# Patient Record
Sex: FEMALE | Race: WHITE | HISPANIC OR LATINO | Employment: FULL TIME | URBAN - METROPOLITAN AREA
[De-identification: names, ages, dates, MRNs, and addresses within clinical notes are randomized per-mention and may not be internally consistent; named-entity substitution may affect disease eponyms.]

---

## 2017-08-23 ENCOUNTER — APPOINTMENT (OUTPATIENT)
Dept: CT IMAGING | Facility: CLINIC | Age: 47
End: 2017-08-23
Attending: EMERGENCY MEDICINE

## 2017-08-23 ENCOUNTER — HOSPITAL ENCOUNTER (EMERGENCY)
Facility: CLINIC | Age: 47
Discharge: HOME | End: 2017-08-24
Attending: EMERGENCY MEDICINE | Admitting: EMERGENCY MEDICINE

## 2017-08-23 DIAGNOSIS — N20.0 CALCULUS OF KIDNEY: ICD-10-CM

## 2017-08-23 LAB
ALBUMIN UR-MCNC: 30 MG/DL
ANION GAP SERPL CALCULATED.3IONS-SCNC: 8 MMOL/L (ref 3–14)
APPEARANCE UR: CLEAR
BACTERIA #/AREA URNS HPF: ABNORMAL /HPF
BASOPHILS # BLD AUTO: 0 10E9/L (ref 0–0.2)
BASOPHILS NFR BLD AUTO: 0.3 %
BILIRUB UR QL STRIP: NEGATIVE
BUN SERPL-MCNC: 10 MG/DL (ref 7–30)
CALCIUM SERPL-MCNC: 8.8 MG/DL (ref 8.5–10.1)
CHLORIDE SERPL-SCNC: 106 MMOL/L (ref 94–109)
CO2 SERPL-SCNC: 26 MMOL/L (ref 20–32)
COLOR UR AUTO: YELLOW
CREAT SERPL-MCNC: 0.85 MG/DL (ref 0.52–1.04)
DIFFERENTIAL METHOD BLD: ABNORMAL
EOSINOPHIL # BLD AUTO: 0.2 10E9/L (ref 0–0.7)
EOSINOPHIL NFR BLD AUTO: 1.4 %
ERYTHROCYTE [DISTWIDTH] IN BLOOD BY AUTOMATED COUNT: 19.5 % (ref 10–15)
GFR SERPL CREATININE-BSD FRML MDRD: 71 ML/MIN/1.7M2
GLUCOSE SERPL-MCNC: 99 MG/DL (ref 70–99)
GLUCOSE UR STRIP-MCNC: NEGATIVE MG/DL
HCT VFR BLD AUTO: 37.2 % (ref 35–47)
HGB BLD-MCNC: 11.8 G/DL (ref 11.7–15.7)
HGB UR QL STRIP: ABNORMAL
IMM GRANULOCYTES # BLD: 0 10E9/L (ref 0–0.4)
IMM GRANULOCYTES NFR BLD: 0.2 %
KETONES UR STRIP-MCNC: NEGATIVE MG/DL
LEUKOCYTE ESTERASE UR QL STRIP: ABNORMAL
LYMPHOCYTES # BLD AUTO: 2.7 10E9/L (ref 0.8–5.3)
LYMPHOCYTES NFR BLD AUTO: 21.7 %
MACROCYTES BLD QL SMEAR: PRESENT
MCH RBC QN AUTO: 23.5 PG (ref 26.5–33)
MCHC RBC AUTO-ENTMCNC: 31.7 G/DL (ref 31.5–36.5)
MCV RBC AUTO: 74 FL (ref 78–100)
MONOCYTES # BLD AUTO: 0.8 10E9/L (ref 0–1.3)
MONOCYTES NFR BLD AUTO: 6.1 %
MUCOUS THREADS #/AREA URNS LPF: PRESENT /LPF
NEUTROPHILS # BLD AUTO: 8.8 10E9/L (ref 1.6–8.3)
NEUTROPHILS NFR BLD AUTO: 70.3 %
NITRATE UR QL: NEGATIVE
NRBC # BLD AUTO: 0 10*3/UL
NRBC BLD AUTO-RTO: 0 /100
OVALOCYTES BLD QL SMEAR: SLIGHT
PH UR STRIP: 6 PH (ref 5–7)
PLATELET # BLD AUTO: 194 10E9/L (ref 150–450)
PLATELET # BLD EST: ABNORMAL 10*3/UL
POTASSIUM SERPL-SCNC: 3.8 MMOL/L (ref 3.4–5.3)
RBC # BLD AUTO: 5.03 10E12/L (ref 3.8–5.2)
RBC #/AREA URNS AUTO: >182 /HPF (ref 0–2)
SODIUM SERPL-SCNC: 140 MMOL/L (ref 133–144)
SOURCE: ABNORMAL
SP GR UR STRIP: 1.01 (ref 1–1.03)
SQUAMOUS #/AREA URNS AUTO: 1 /HPF (ref 0–1)
UROBILINOGEN UR STRIP-MCNC: NORMAL MG/DL (ref 0–2)
WBC # BLD AUTO: 12.5 10E9/L (ref 4–11)
WBC #/AREA URNS AUTO: 20 /HPF (ref 0–2)

## 2017-08-23 PROCEDURE — 85025 COMPLETE CBC W/AUTO DIFF WBC: CPT | Performed by: EMERGENCY MEDICINE

## 2017-08-23 PROCEDURE — 96361 HYDRATE IV INFUSION ADD-ON: CPT

## 2017-08-23 PROCEDURE — 96374 THER/PROPH/DIAG INJ IV PUSH: CPT

## 2017-08-23 PROCEDURE — 25000128 H RX IP 250 OP 636: Performed by: EMERGENCY MEDICINE

## 2017-08-23 PROCEDURE — 74176 CT ABD & PELVIS W/O CONTRAST: CPT

## 2017-08-23 PROCEDURE — 99285 EMERGENCY DEPT VISIT HI MDM: CPT | Mod: 25

## 2017-08-23 PROCEDURE — 80048 BASIC METABOLIC PNL TOTAL CA: CPT | Performed by: EMERGENCY MEDICINE

## 2017-08-23 PROCEDURE — 81001 URINALYSIS AUTO W/SCOPE: CPT | Performed by: EMERGENCY MEDICINE

## 2017-08-23 PROCEDURE — 96375 TX/PRO/DX INJ NEW DRUG ADDON: CPT

## 2017-08-23 RX ORDER — SODIUM CHLORIDE 9 MG/ML
1000 INJECTION, SOLUTION INTRAVENOUS CONTINUOUS
Status: DISCONTINUED | OUTPATIENT
Start: 2017-08-23 | End: 2017-08-24 | Stop reason: HOSPADM

## 2017-08-23 RX ORDER — KETOROLAC TROMETHAMINE 30 MG/ML
30 INJECTION, SOLUTION INTRAMUSCULAR; INTRAVENOUS ONCE
Status: COMPLETED | OUTPATIENT
Start: 2017-08-23 | End: 2017-08-23

## 2017-08-23 RX ORDER — NITROFURANTOIN 25; 75 MG/1; MG/1
100 CAPSULE ORAL 2 TIMES DAILY
Qty: 14 CAPSULE | Refills: 0 | Status: ON HOLD | OUTPATIENT
Start: 2017-08-23 | End: 2017-08-29

## 2017-08-23 RX ORDER — ONDANSETRON 2 MG/ML
4 INJECTION INTRAMUSCULAR; INTRAVENOUS EVERY 30 MIN PRN
Status: DISCONTINUED | OUTPATIENT
Start: 2017-08-23 | End: 2017-08-24 | Stop reason: HOSPADM

## 2017-08-23 RX ADMIN — ONDANSETRON 4 MG: 2 SOLUTION INTRAMUSCULAR; INTRAVENOUS at 22:47

## 2017-08-23 RX ADMIN — SODIUM CHLORIDE 1000 ML: 9 INJECTION, SOLUTION INTRAVENOUS at 23:44

## 2017-08-23 RX ADMIN — KETOROLAC TROMETHAMINE 30 MG: 30 INJECTION, SOLUTION INTRAMUSCULAR at 22:48

## 2017-08-23 RX ADMIN — SODIUM CHLORIDE 1000 ML: 9 INJECTION, SOLUTION INTRAVENOUS at 22:30

## 2017-08-23 ASSESSMENT — ENCOUNTER SYMPTOMS
ABDOMINAL PAIN: 1
FLANK PAIN: 1

## 2017-08-23 NOTE — ED AVS SNAPSHOT
"  Emergency Department    [address] [facility] [address]    Phone:  [phone]    Fax:  [phone]                                       Ayesha Pereyra   MRN: [id number]    Department:   Emergency Department   Date of Visit:  8/23/2017           Patient Information     Date Of Birth          1970        Your diagnoses for this visit were:     Calculus of kidney        You were seen by Zeyad Reardon MD.      Follow-up Information     Follow up with No Ref-Primary, Physician.    Why:  As needed        Follow up with  Emergency Department.    Specialty:  EMERGENCY MEDICINE    Why:  If symptoms worsen    Contact information:    [address] [facility] [address] [phone]        Discharge Instructions         * Cálculo Renal [Kidney Stone W/ Colic]    Muriel nanci retorcijón (cólico [cramping]) que usted siente, así jay las náuseas (nausea) y el vómito (vomiting) que tiene se deben a whit pequeña aurea (cálculo [stone]) que se le formó en el riñón y que ahora está pasando por un conducto angosto (el uréter), virginie hacia la vejiga. Whit vez que llegue a la vejiga, el dolor se detendrá. Es posible que el cálculo (aurea [stone]) pase entero por el tracto urinario. [El tamaño puede ser de entre 1/16\" y 1/4\" (1 y 6 mm)]. También puede ser que la aurea se desgrane en fragmentos arenosos de los que usted quizás ni siquiera se dé cuenta.  Cuando se ha tenido un cálculo renal (kidney stone), hay probabilidades de que otro vuelva a formarse en el futuro.  Cuidados En [place]:  1. Richelle gran cantidad de agua (al menos, entre 8 y 10 vasos al día).  2. La mayoría de los cálculos (piedras [stones]) salen solas, roz pueden tardar desde algunas horas a algunos días en hacerlo.  3. Cada vez que orine, hágalo en un recipiente (jarra o algo similar). Vierta la orina de muriel recipiente en el inodoro pasándola por un colador. Hágalo así hasta que hayan " pasado 24 horas después de que haya cesado el dolor. Para entonces, si había un cálculo renal (aurea en el riñón [kidney stone]), ya debería stan salido de la vejiga. Algunas piedras se disuelven en partículas que parecen arena y atraviesan el colador sin que usted lo note. Si eso sucede, no verá ninguna aurea.  4. Si encuentra naina aurea en el colador, guárdela y llévesela al médico para que la analice. Hay algunos tipos de aurea cuya formación se puede prevenir. Por lo tanto, es importante saber qué tipo de aurea es la que usted tiene.  5. Intente mantenerse lo más activo posible, dado que eso ayudará a expulsar la aurea. No se quede en la cama a menos que el dolor le impida estar levantado. Quizás note que la orina es de color rojiza, rosada o amarronada. Es normal cuando se está despidiendo un cálculo renal (kidney stone).  Programe naina VISITA DE CONTROL con rios médico o regrese a ken centro si el dolor dura más de 48 horas.  Busque Prontamente Atención Médica  si algo de lo siguiente ocurre:    El dolor no disminuye con el medicamento que le dieron.    Vómito persistente o no puede mantener los líquidos en el estómago.    Debilidad, mareo o desmayo.    Fiebre de 101 F (38.3 C) o más pierre.    Orina opaca de color rojizo o amarronada (no se puede shirley a través de joanie) u orina que tiene muchos coágulos de tosin (blood clots).    No ha podido orinar en 8 horas y siente naina presión en la vejiga que es cada vez mayor.       [id number] The Narrative. [address] [place] [address], [place] [address]. Todos los derechos reservados. Esta información no pretende sustituir la atención médica profesional. Sólo rios médico puede diagnosticar y tratar un problema de allison.          El tratamiento de los cálculos renales: terapia expectante  La mayoría de los cálculos renales son del tamaño de naina semilla de uva. Los cálculos de ken tamaño son lo suficientemente pequeños jay para ser expulsados naturalmente con la  orina. Whit vez expulsados, pueden analizarse. Esta estrategia de  esperar y shirley  se conoce jay tratamiento expectante. A menudo, las piedras pequeñas pueden expulsarse dahlia ken tipo de terapia. Si tiene dolor, hable con rios médico respecto de usar analgésicos y siga finn indicaciones sobre cuánta cantidad de agua debe cristina. Beber más agua produce más orina, con lo que tendrá más probabilidades de eliminar la aurea. También use un filtro para colar rios orina y lleve todas las piedras que encuentre a rios médico para que las analice.    Richelle mucha agua  Beber mucha agua puede facilitarle la expulsión de los cálculos. El agua también diluye las sustancias químicas que hay en rios orina, lo cual reduce el riesgo de formación de piedras nuevas. Puede que le hayan indicado beber ocho vasos de 12 onzas (355 cc) de agua al día. Evite líquidos que provoquen deshidratación, jay los que contienen cafeína o alcohol.  Filtre rios orina  [place] le permitirá recoger finn piedras para analizarlas. Use el filtro o colador cada que vez que orine. Hágalo dahlia todo el tiempo que rios médico le sugiera. Observe si ve motas o piedritas diminutas de color marrón, seema, dorado o tiffanie. Pueden ser cálculos renales.  [place] rios medicamento  Es posible que rios médico le haya dado un medicamento que aumente finn probabilidades de eliminar un cálculo renal.   Maximiliano el seguimiento con rios médico  Asista a las visitas de control y lleve todas las piedras que encuentre a rios médico para que las analice. El tipo de aurea que tenga determina rios dieta y rios programa de prevención. Es posible que necesite más pruebas en el futuro para asegurar que no se estén formando piedras nuevas.  Date Last Reviewed: 1/5/2015 2000-2017 [employer]. [address] [place] [address], [place] [address]. Todos los derechos reservados. Esta información no pretende sustituir la atención médica profesional. Sólo rios médico puede diagnosticar y tratar un problema de  allison.          24 Hour Appointment Hotline       To make an appointment at any [facility], call [phone] ([phone]). If you don't have a family doctor or clinic, we will help you find one. [facility] are conveniently located to serve the needs of you and your family.             Review of your medicines      START taking        Dose / Directions Last dose taken    HYDROcodone-acetaminophen 5-325 MG per tablet   Commonly known as:  NORCO   Dose:  1-2 tablet   Quantity:  15 tablet        Take 1-2 tablets by mouth every 6 hours as needed for moderate to severe pain   Refills:  0        nitroFURantoin (macrocrystal-monohydrate) 100 MG capsule   Commonly known as:  MACROBID   Dose:  100 mg   Quantity:  14 capsule        Take 1 capsule (100 mg) by mouth 2 times daily   Refills:  0        tamsulosin 0.4 MG capsule   Commonly known as:  FLOMAX   Dose:  0.4 mg   Quantity:  7 capsule        Take 1 capsule (0.4 mg) by mouth daily for 7 doses   Refills:  0          CONTINUE these medicines which may have CHANGED, or have new prescriptions. If we are uncertain of the size of tablets/capsules you have at home, strength may be listed as something that might have changed.        Dose / Directions Last dose taken    * ibuprofen 200 MG tablet   Commonly known as:  ADVIL/MOTRIN   Dose:  800 mg   What changed:  Another medication with the same name was added. Make sure you understand how and when to take each.   Quantity:  1 tablet        Take 4 tablets by mouth every 8 hours as needed for pain.   Refills:  0        * ibuprofen 200 MG tablet   Commonly known as:  ADVIL/MOTRIN   Dose:  400 mg   What changed:  You were already taking a medication with the same name, and this prescription was added. Make sure you understand how and when to take each.   Quantity:  60 tablet        Take 2 tablets (400 mg) by mouth every 8 hours as needed for pain   Refills:  0        * Notice:  This list has 2 medication(s) that are  the same as other medications prescribed for you. Read the directions carefully, and ask your doctor or other care provider to review them with you.      Our records show that you are taking the medicines listed below. If these are incorrect, please call your family doctor or clinic.        Dose / Directions Last dose taken    acetaminophen 500 MG tablet   Commonly known as:  TYLENOL   Dose:  500-1000 mg   Quantity:  1 tablet        Take 1-2 tablets by mouth every 6 hours as needed for pain.   Refills:  0        zolpidem 10 MG tablet   Commonly known as:  AMBIEN   Dose:  10 mg   Quantity:  10 tablet        Take 1 tablet by mouth nightly as needed for sleep.   Refills:  0                Prescriptions were sent or printed at these locations (4 Prescriptions)                   Other Prescriptions                Printed at Department/Unit printer (4 of 4)         nitroFURantoin, macrocrystal-monohydrate, (MACROBID) 100 MG capsule               HYDROcodone-acetaminophen (NORCO) 5-325 MG per tablet               tamsulosin (FLOMAX) 0.4 MG capsule               ibuprofen (ADVIL/MOTRIN) 200 MG tablet                Procedures and tests performed during your visit     Basic metabolic panel    CBC with platelets differential    CT Abdomen Pelvis w/o Contrast (stone protocol)    UA with Microscopic      Orders Needing Specimen Collection     None      Pending Results     No orders found for last 3 day(s).            Pending Culture Results     No orders found for last 3 day(s).            Pending Results Instructions     If you had any lab results that were not finalized at the time of your Discharge, you can call the ED Lab Result RN at [phone]. You will be contacted by this team for any positive Lab results or changes in treatment. The nurses are available 7 days a week from 10A to 6:30P.  You can leave a message 24 hours per day and they will return your call.        Test Results From Your Hospital Stay         8/23/2017 11:18 PM      Component Results     Component Value Ref Range & Units Status    WBC 12.5 (H) 4.0 - 11.0 10e9/L Final    RBC Count 5.03 3.8 - 5.2 10e12/L Final    Hemoglobin 11.8 11.7 - 15.7 g/dL Final    Hematocrit 37.2 35.0 - 47.0 % Final    MCV 74 (L) 78 - 100 fl Final    MCH 23.5 (L) 26.5 - 33.0 pg Final    MCHC 31.7 31.5 - 36.5 g/dL Final    RDW 19.5 (H) 10.0 - 15.0 % Final    Platelet Count 194 150 - 450 10e9/L Final    Diff Method Automated Method  Final    % Neutrophils 70.3 % Final    % Lymphocytes 21.7 % Final    % Monocytes 6.1 % Final    % Eosinophils 1.4 % Final    % Basophils 0.3 % Final    % Immature Granulocytes 0.2 % Final    Nucleated RBCs 0 0 /100 Final    Absolute Neutrophil 8.8 (H) 1.6 - 8.3 10e9/L Final    Absolute Lymphocytes 2.7 0.8 - 5.3 10e9/L Final    Absolute Monocytes 0.8 0.0 - 1.3 10e9/L Final    Absolute Eosinophils 0.2 0.0 - 0.7 10e9/L Final    Absolute Basophils 0.0 0.0 - 0.2 10e9/L Final    Abs Immature Granulocytes 0.0 0 - 0.4 10e9/L Final    Absolute Nucleated RBC 0.0  Final    Ovalocytes Slight  Final    Macrocytes Present  Final    Platelet Estimate   Final    Confirming automated cell count         8/23/2017 11:08 PM      Component Results     Component Value Ref Range & Units Status    Sodium 140 133 - 144 mmol/L Final    Potassium 3.8 3.4 - 5.3 mmol/L Final    Chloride 106 94 - 109 mmol/L Final    Carbon Dioxide 26 20 - 32 mmol/L Final    Anion Gap 8 3 - 14 mmol/L Final    Glucose 99 70 - 99 mg/dL Final    Urea Nitrogen 10 7 - 30 mg/dL Final    Creatinine 0.85 0.52 - 1.04 mg/dL Final    GFR Estimate 71 >60 mL/min/1.7m2 Final    Non  GFR Calc    GFR Estimate If Black 86 >60 mL/min/1.7m2 Final    African American GFR Calc    Calcium 8.8 8.5 - 10.1 mg/dL Final         8/23/2017 11:19 PM      Component Results     Component Value Ref Range & Units Status    Color Urine Yellow  Final    Appearance Urine Clear  Final    Glucose Urine Negative NEG^Negative  mg/dL Final    Bilirubin Urine Negative NEG^Negative Final    Ketones Urine Negative NEG^Negative mg/dL Final    Specific Gravity Urine 1.014 1.003 - 1.035 Final    Blood Urine Moderate (A) NEG^Negative Final    pH Urine 6.0 5.0 - 7.0 pH Final    Protein Albumin Urine 30 (A) NEG^Negative mg/dL Final    Urobilinogen mg/dL Normal 0.0 - 2.0 mg/dL Final    Nitrite Urine Negative NEG^Negative Final    Leukocyte Esterase Urine Trace (A) NEG^Negative Final    Source Midstream Urine  Final    WBC Urine 20 (H) 0 - 2 /HPF Final    RBC Urine >182 (H) 0 - 2 /HPF Final    Bacteria Urine Few (A) NEG^Negative /HPF Final    Squamous Epithelial /HPF Urine 1 0 - 1 /HPF Final    Mucous Urine Present (A) NEG^Negative /LPF Final         8/23/2017 11:44 PM      Narrative     CT ABDOMEN PELVIS W/O CONTRAST  8/23/2017 11:15 PM     INDICATION: Abdominal or flank pain, possible stone.      TECHNIQUE: Thin axial images through the abdomen and pelvis without  contrast. Coronal reformatted images. Radiation dose for this scan was  reduced using automated exposure control, adjustment of the mA and/or  kV according to patient size, or iterative reconstruction technique.    COMPARISON: 4/12/2012.    FINDINGS: Mild-moderate right hydronephrosis and perinephric stranding  due to a 0.4 cm UVJ stone. No other urinary stones. Liver is slightly  enlarged measuring 21 cm craniocaudad. No focal liver lesions.  Contracted gallbladder. Spleen, pancreas and adrenal glands are  negative without benefit of contrast.    Uterus is present. No suspicious pelvic masses. No bowel obstruction  or other acute findings. Mild degenerative and hypertrophic changes in  the visualized spine.        Impression     IMPRESSION: Mild-moderate right hydronephrosis due to a 0.4 cm UVJ  stone.    CURRY ONEIL MD                Clinical Quality Measure: Blood Pressure Screening     Your blood pressure was checked while you were in the emergency department today. The last  "reading we obtained was  BP: 133/73 . Please read the guidelines below about what these numbers mean and what you should do about them.  If your systolic blood pressure (the top number) is less than 120 and your diastolic blood pressure (the bottom number) is less than 80, then your blood pressure is normal. There is nothing more that you need to do about it.  If your systolic blood pressure (the top number) is 120-139 or your diastolic blood pressure (the bottom number) is 80-89, your blood pressure may be higher than it should be. You should have your blood pressure rechecked within a year by a primary care provider.  If your systolic blood pressure (the top number) is 140 or greater or your diastolic blood pressure (the bottom number) is 90 or greater, you may have high blood pressure. High blood pressure is treatable, but if left untreated over time it can put you at risk for heart attack, stroke, or kidney failure. You should have your blood pressure rechecked by a primary care provider within the next 4 weeks.  If your provider in the emergency department today gave you specific instructions to follow-up with your doctor or provider even sooner than that, you should follow that instruction and not wait for up to 4 weeks for your follow-up visit.        Thank you for choosing [facility]       Thank you for choosing [facility] for your care. Our goal is always to provide you with excellent care. Hearing back from our patients is one way we can continue to improve our services. Please take a few minutes to complete the written survey that you may receive in the mail after you visit with us. Thank you!        [facility] Information     [facility] lets you send messages to your doctor, view your test results, renew your prescriptions, schedule appointments and more. To sign up, go to [email or web address] . Click on \"Log in\" on the left side of the screen, which will take you to the Welcome page. Then click on \"Sign up " "Now\" on the right side of the page.     You will be asked to enter the access code listed below, as well as some personal information. Please follow the directions to create your username and password.     Your access code is: [email or web address]  Expires: 2017 12:06 AM     Your access code will  in 90 days. If you need help or a new code, please call your [facility] clinic or [phone].        Care EveryWhere ID     This is your Care EveryWhere ID. This could be used by other organizations to access your [facility] medical records  [id number]        Equal Access to Services     [facility] : Milly Garcia, mehdi cueva, winston gallardo, jose g yee . So [facility] [phone].    ATENCIÓN: Si habla español, tiene a rios disposición servicios gratuitos de asistencia lingüística. Llame al [phone].    We comply with applicable federal civil rights laws and [place] laws. We do not discriminate on the basis of race, color, national origin, age, disability sex, sexual orientation or gender identity.            After Visit Summary       This is your record. Keep this with you and show to your community pharmacist(s) and doctor(s) at your next visit.                  "

## 2017-08-23 NOTE — ED AVS SNAPSHOT
Emergency Department    [address] [place] [address]    Phone:  [phone]    Fax:  [phone]                                       Ayesha Pereyra   MRN: [id number]    Department:   Emergency Department   Date of Visit:  8/23/2017           After Visit Summary Signature Page     I have received my discharge instructions, and my questions have been answered. I have discussed any challenges I see with this plan with the nurse or doctor.    ..........................................................................................................................................  Patient/Patient Representative Signature      ..........................................................................................................................................  Patient Representative Print Name and Relationship to Patient    ..................................................               ................................................  Date                                            Time    ..........................................................................................................................................  Reviewed by Signature/Title    ...................................................              ..............................................  Date                                                            Time

## 2017-08-24 VITALS
DIASTOLIC BLOOD PRESSURE: 73 MMHG | SYSTOLIC BLOOD PRESSURE: 133 MMHG | OXYGEN SATURATION: 97 % | TEMPERATURE: 99.1 F | HEIGHT: 66 IN

## 2017-08-24 PROCEDURE — 25000132 ZZH RX MED GY IP 250 OP 250 PS 637: Performed by: EMERGENCY MEDICINE

## 2017-08-24 RX ORDER — HYDROCODONE BITARTRATE AND ACETAMINOPHEN 5; 325 MG/1; MG/1
1-2 TABLET ORAL EVERY 6 HOURS PRN
Qty: 15 TABLET | Refills: 0 | Status: ON HOLD | OUTPATIENT
Start: 2017-08-24 | End: 2017-08-28

## 2017-08-24 RX ORDER — IBUPROFEN 200 MG
400 TABLET ORAL EVERY 8 HOURS PRN
Qty: 60 TABLET | Refills: 0 | Status: ON HOLD | OUTPATIENT
Start: 2017-08-24 | End: 2017-08-28

## 2017-08-24 RX ORDER — HYDROCODONE BITARTRATE AND ACETAMINOPHEN 5; 325 MG/1; MG/1
1 TABLET ORAL ONCE
Status: COMPLETED | OUTPATIENT
Start: 2017-08-24 | End: 2017-08-24

## 2017-08-24 RX ORDER — TAMSULOSIN HYDROCHLORIDE 0.4 MG/1
0.4 CAPSULE ORAL DAILY
Qty: 7 CAPSULE | Refills: 0 | Status: ON HOLD | OUTPATIENT
Start: 2017-08-24 | End: 2017-08-28

## 2017-08-24 RX ADMIN — HYDROCODONE BITARTRATE AND ACETAMINOPHEN 1 TABLET: 5; 325 TABLET ORAL at 00:26

## 2017-08-24 NOTE — ED PROVIDER NOTES
"  History     Chief Complaint:  Abdominal Pain    The patient's preferred language is [place] and subsequently a translation was provided by Dr. Reardon.    VELIA Pereyra is a 46 year old female who presents to the emergency department today for abdominal pain. The patient reports right middle and lower quadrant abdominal pain since this morning that she describes as a sharp, poking sensation. The pain radiates to her right flank and is similar to her previous episodes.  Specifically, the pain comes and goes, and is accompanied by hematuria.  Severe when present.  Not worse with exertion or with p.o. intake.    Allergies:  Keflex [Cephalexin]      Medications:    zolpidem (AMBIEN) 10 MG tablet    Past Medical History:    Kidney Stones    Past Surgical History:    GYN Surgery  Hysterectomy    Family History:    History reviewed. No pertinent family history.     Social History:  The patient was accompanied to the ED by her family.  Smoking Status: Never Smoker  Smokeless Tobacco: Never Used  Alcohol Use: No   Marital Status:  Single      Review of Systems   Gastrointestinal: Positive for abdominal pain.   Genitourinary: Positive for flank pain (right).   All other systems reviewed and are negative.    Physical Exam   First Vitals:  BP: 153/78  Heart Rate: 63  Temp: 99.1  F (37.3  C)  Height: 167.6 cm (5' 6\")  SpO2: 100 %    Physical Exam  General: [facility], cooperative, and alert to conversation  Eyes: Tracking well, clear conj BL  ENT: MMM, neck supple with no TTP  CV: RRR no JVD noted  Respiratory:  Lungs are CTA BL.  No tachypnea, no accessory m use, speaking in full sentences.  Abd: Does have right flank and right lower quadrant tenderness to palpation, with no guarding and rebound.  Does have right CVA tenderness.  Soft overall.  Skin: No skin changes, no edema or rash present to extremities  Neuro: Clear speech and no facial droop.  Psych: Not RIS, no e/o " AH/VH    Emergency Department Course     Imaging:  Radiology findings were communicated with the patient who voiced understanding of the findings.    CT Abdomen Pelvis w/o Contrast (stone protocol)  MPRESSION: Mild-moderate right hydronephrosis due to a 0.4 cm UVJ  stone.  Report per radiology      Laboratory:  Laboratory findings were communicated with the patient who voiced understanding of the findings.  UA with Microscopic: clear yellow urine, moderate blood, protein albumin 30, trace leukocyte esterase, WBC/HPF 20 (H), RBC/HPF >182 (H), few bacteria, mucous present o/w WNL  CBC: WBC 12.5 (H) o/w WNL. (HGB 11.8, )   BMP: AWNL (Creatinine 0.85)     Interventions:  2230: NS Bolus 1,000mL IV  2247: Zofran 4mg IV  2248: Toradol 30mg IV      Emergency Department Course:  Nursing notes and vitals reviewed.  2052: I performed an exam of the patient as documented above.   The patient was sent for a CT Abdomen Pelvis w/o Contrast (stone protocol) while in the emergency department, results above.   IV was inserted and blood was drawn for laboratory testing, results above.  The patient provided a urine sample here in the emergency department. This was sent for laboratory testing, findings above.   12821: Patient rechecked and updated.    I discussed the treatment plan with the patient. They expressed understanding of this plan and consented to discharge. They will be discharged home with instructions for care and follow up. In addition, the patient will return to the emergency department if their symptoms persist, worsen, if new symptoms arise or if there is any concern.  All questions were answered.   I personally reviewed the laboratory and imaging results with the Patient and answered all related questions prior to discharge.    Impression & Plan      Medical Decision Making:  Ayesha Pereyra is a 46 year old female who presents with what appears to be a kidney stone. The patient's pain profile does fit  well with that of a kidney stone, which was confirmed on a CT scan. She does have mild hydro however it is a distal stone and 4 mm, the patient does merit a trial passage. I will plan for pain control and discharge into primary care follow up. At this point, I see no evidence of appendicitis or other abdominal pathology. The patient has no fevers, normal vital signs and a urine that shows trace leukocyte esterase. However given hydronephrosis, we will give prophylactic antibiotics as patient will follow up with regular doctor. The patient was in agreement with the plan and all questions were answered.    Diagnosis:    ICD-10-CM    1. Calculus of kidney N20.0      Disposition:  discharged to home with below prescription    Discharge Medications:  New Prescriptions    NITROFURANTOIN, MACROCRYSTAL-MONOHYDRATE, (MACROBID) 100 MG CAPSULE    Take 1 capsule (100 mg) by mouth 2 times daily     Scribe Disclosure:  THOR, Patti Nolasco, am serving as a scribe at 10:54 PM on 8/23/2017 to document services personally performed by Zeyad Reardon MD based on my observations and the provider's statements to me.    8/23/2017    EMERGENCY DEPARTMENT       Zeyad Reardon MD  08/24/17 0017

## 2017-08-24 NOTE — ED NOTES
"Patient presents with RLQ abdominal pain, right flank pain and pain with urination. Patient states it is a sharp pain with burning with urination.  Patient has been told in past she has \"kidney rocks\". Patient alert and oriented, has been nauseated, is on day 3 of menstrual cycle.    "

## 2017-08-24 NOTE — DISCHARGE INSTRUCTIONS
"  * Cálculo Renal [Kidney Stone W/ Colic]    Muriel nanci retorcijón (cólico [cramping]) que usted siente, así jay las náuseas (nausea) y el vómito (vomiting) que tiene se deben a naina pequeña aurea (cálculo [stone]) que se le formó en el riñón y que ahora está pasando por un conducto angosto (el uréter), virginie hacia la vejiga. Naina vez que llegue a la vejiga, el dolor se detendrá. Es posible que el cálculo (aurea [stone]) pase entero por el tracto urinario. [El tamaño puede ser de entre 1/16\" y 1/4\" (1 y 6 mm)]. También puede ser que la aurea se desgrane en fragmentos arenosos de los que usted quizás ni siquiera se dé cuenta.  Cuando se ha tenido un cálculo renal (kidney stone), hay probabilidades de que otro vuelva a formarse en el futuro.  Cuidados En [place]:  1. Richelle gran cantidad de agua (al menos, entre 8 y 10 vasos al día).  2. La mayoría de los cálculos (piedras [stones]) salen solas, roz pueden tardar desde algunas horas a algunos días en hacerlo.  3. Cada vez que orine, hágalo en un recipiente (jarra o algo similar). Vierta la orina de muriel recipiente en el inodoro pasándola por un colador. Hágalo así hasta que hayan pasado 24 horas después de que haya cesado el dolor. Para entonces, si había un cálculo renal (aurea en el riñón [kidney stone]), ya debería stan salido de la vejiga. Algunas piedras se disuelven en partículas que parecen arena y atraviesan el colador sin que usted lo note. Si eso sucede, no verá ninguna aurea.  4. Si encuentra naina aurea en el colador, guárdela y llévesela al médico para que la analice. Hay algunos tipos de aurea cuya formación se puede prevenir. Por lo tanto, es importante saber qué tipo de aurea es la que usted tiene.  5. Intente mantenerse lo más activo posible, dado que eso ayudará a expulsar la aurea. No se quede en la cama a menos que el dolor le impida estar levantado. Quizás note que la orina es de color rojiza, rosada o amarronada. Es normal cuando se está " despidiendo un cálculo renal (kidney stone).  Programe naina VISITA DE CONTROL con rios médico o regrese a ken centro si el dolor dura más de 48 horas.  Busque Prontamente Atención Médica  si algo de lo siguiente ocurre:    El dolor no disminuye con el medicamento que le dieron.    Vómito persistente o no puede mantener los líquidos en el estómago.    Debilidad, mareo o desmayo.    Fiebre de 101 F (38.3 C) o más pierre.    Orina opaca de color rojizo o amarronada (no se puede shirley a través de joanie) u orina que tiene muchos coágulos de tosin (blood clots).    No ha podido orinar en 8 horas y siente naina presión en la vejiga que es cada vez mayor.       [id number] [employer]. [address] [place] [id number]. Todos los derechos reservados. Esta información no pretende sustituir la atención médica profesional. Sólo rios médico puede diagnosticar y tratar un problema de allison.          El tratamiento de los cálculos renales: terapia expectante  La mayoría de los cálculos renales son del tamaño de naina semilla de uva. Los cálculos de ken tamaño son lo suficientemente pequeños jay para ser expulsados naturalmente con la orina. Naina vez expulsados, pueden analizarse. Esta estrategia de  esperar y shirley  se conoce jay tratamiento expectante. A menudo, las piedras pequeñas pueden expulsarse dahlia ken tipo de terapia. Si tiene dolor, hable con rios médico respecto de usar analgésicos y siga finn indicaciones sobre cuánta cantidad de agua debe cristina. Beber más agua produce más orina, con lo que tendrá más probabilidades de eliminar la aurea. También use un filtro para colar rios orina y lleve todas las piedras que encuentre a rios médico para que las analice.    Richelle mucha agua  Beber mucha agua puede facilitarle la expulsión de los cálculos. El agua también diluye las sustancias químicas que hay en rios orina, lo cual reduce el riesgo de formación de piedras nuevas. Puede que le hayan indicado beber ocho vasos de 12  onzas (355 cc) de agua al día. Evite líquidos que provoquen deshidratación, jay los que contienen cafeína o alcohol.  Filtre rios orina  [place] le permitirá recoger finn piedras para analizarlas. Use el filtro o colador cada que vez que orine. Hágalo dahlia todo el tiempo que rios médico le sugiera. Observe si ve motas o piedritas diminutas de color marrón, seema, dorado o tiffanie. Pueden ser cálculos renales.  [place] rios medicamento  Es posible que rios médico le haya dado un medicamento que aumente finn probabilidades de eliminar un cálculo renal.   Maximiliano el seguimiento con rios médico  Asista a las visitas de control y lleve todas las piedras que encuentre a rios médico para que las analice. El tipo de aurea que tenga determina rios dieta y rios programa de prevención. Es posible que necesite más pruebas en el futuro para asegurar que no se estén formando piedras nuevas.  Date Last Reviewed: 1/5/2015 2000-2017 [employer]. [address] [place] [id number]. Todos los derechos reservados. Esta información no pretende sustituir la atención médica profesional. Sólo rios médico puede diagnosticar y tratar un problema de allison.

## 2017-08-25 ENCOUNTER — APPOINTMENT (OUTPATIENT)
Dept: GENERAL RADIOLOGY | Facility: CLINIC | Age: 47
DRG: 871 | End: 2017-08-25
Attending: INTERNAL MEDICINE

## 2017-08-25 ENCOUNTER — ANESTHESIA EVENT (OUTPATIENT)
Dept: SURGERY | Facility: CLINIC | Age: 47
DRG: 871 | End: 2017-08-25

## 2017-08-25 ENCOUNTER — HOSPITAL ENCOUNTER (INPATIENT)
Facility: CLINIC | Age: 47
LOS: 4 days | Discharge: HOME | DRG: 871 | End: 2017-08-29
Attending: EMERGENCY MEDICINE | Admitting: INTERNAL MEDICINE

## 2017-08-25 ENCOUNTER — APPOINTMENT (OUTPATIENT)
Dept: GENERAL RADIOLOGY | Facility: CLINIC | Age: 47
DRG: 871 | End: 2017-08-25
Attending: EMERGENCY MEDICINE

## 2017-08-25 ENCOUNTER — ANESTHESIA (OUTPATIENT)
Dept: SURGERY | Facility: CLINIC | Age: 47
DRG: 871 | End: 2017-08-25

## 2017-08-25 DIAGNOSIS — N20.0 KIDNEY STONE ON RIGHT SIDE: ICD-10-CM

## 2017-08-25 DIAGNOSIS — A41.9 SEPTIC SHOCK (H): ICD-10-CM

## 2017-08-25 DIAGNOSIS — R19.7 DIARRHEA, UNSPECIFIED TYPE: Primary | ICD-10-CM

## 2017-08-25 DIAGNOSIS — R65.21 SEPTIC SHOCK (H): ICD-10-CM

## 2017-08-25 DIAGNOSIS — N20.0 CALCULUS OF KIDNEY: ICD-10-CM

## 2017-08-25 DIAGNOSIS — A41.9 SEPSIS, DUE TO UNSPECIFIED ORGANISM: ICD-10-CM

## 2017-08-25 PROBLEM — N20.1 RIGHT URETERAL STONE: Status: ACTIVE | Noted: 2017-08-25

## 2017-08-25 PROBLEM — N12 PYELONEPHRITIS: Status: ACTIVE | Noted: 2017-08-25

## 2017-08-25 LAB
ALBUMIN UR-MCNC: 10 MG/DL
ANION GAP SERPL CALCULATED.3IONS-SCNC: 9 MMOL/L (ref 3–14)
APPEARANCE UR: ABNORMAL
BACTERIA #/AREA URNS HPF: ABNORMAL /HPF
BASOPHILS # BLD AUTO: 0 10E9/L (ref 0–0.2)
BASOPHILS NFR BLD AUTO: 0.1 %
BILIRUB UR QL STRIP: NEGATIVE
BUN SERPL-MCNC: 9 MG/DL (ref 7–30)
CALCIUM SERPL-MCNC: 8.6 MG/DL (ref 8.5–10.1)
CHLORIDE SERPL-SCNC: 104 MMOL/L (ref 94–109)
CO2 SERPL-SCNC: 23 MMOL/L (ref 20–32)
COLOR UR AUTO: ABNORMAL
CREAT SERPL-MCNC: 0.77 MG/DL (ref 0.52–1.04)
DIFFERENTIAL METHOD BLD: ABNORMAL
EOSINOPHIL # BLD AUTO: 0 10E9/L (ref 0–0.7)
EOSINOPHIL NFR BLD AUTO: 0 %
ERYTHROCYTE [DISTWIDTH] IN BLOOD BY AUTOMATED COUNT: 19.9 % (ref 10–15)
GFR SERPL CREATININE-BSD FRML MDRD: 79 ML/MIN/1.7M2
GLUCOSE BLDC GLUCOMTR-MCNC: 138 MG/DL (ref 70–99)
GLUCOSE BLDC GLUCOMTR-MCNC: 142 MG/DL (ref 70–99)
GLUCOSE BLDC GLUCOMTR-MCNC: 163 MG/DL (ref 70–99)
GLUCOSE SERPL-MCNC: 123 MG/DL (ref 70–99)
GLUCOSE UR STRIP-MCNC: NEGATIVE MG/DL
HCT VFR BLD AUTO: 32.5 % (ref 35–47)
HGB BLD-MCNC: 10.5 G/DL (ref 11.7–15.7)
HGB UR QL STRIP: ABNORMAL
IMM GRANULOCYTES # BLD: 0.1 10E9/L (ref 0–0.4)
IMM GRANULOCYTES NFR BLD: 0.4 %
INTERPRETATION ECG - MUSE: NORMAL
KETONES UR STRIP-MCNC: 40 MG/DL
LACTATE BLD-SCNC: 1 MMOL/L (ref 0.7–2)
LEUKOCYTE ESTERASE UR QL STRIP: ABNORMAL
LYMPHOCYTES # BLD AUTO: 0.6 10E9/L (ref 0.8–5.3)
LYMPHOCYTES NFR BLD AUTO: 3.5 %
MACROCYTES BLD QL SMEAR: PRESENT
MCH RBC QN AUTO: 23.6 PG (ref 26.5–33)
MCHC RBC AUTO-ENTMCNC: 32.3 G/DL (ref 31.5–36.5)
MCV RBC AUTO: 73 FL (ref 78–100)
MONOCYTES # BLD AUTO: 1.3 10E9/L (ref 0–1.3)
MONOCYTES NFR BLD AUTO: 7.9 %
MUCOUS THREADS #/AREA URNS LPF: PRESENT /LPF
NEUTROPHILS # BLD AUTO: 14.4 10E9/L (ref 1.6–8.3)
NEUTROPHILS NFR BLD AUTO: 88.1 %
NITRATE UR QL: NEGATIVE
NRBC # BLD AUTO: 0 10*3/UL
NRBC BLD AUTO-RTO: 0 /100
OVALOCYTES BLD QL SMEAR: SLIGHT
PH UR STRIP: 5.5 PH (ref 5–7)
PLATELET # BLD AUTO: 140 10E9/L (ref 150–450)
PLATELET # BLD EST: ABNORMAL 10*3/UL
POTASSIUM SERPL-SCNC: 3.1 MMOL/L (ref 3.4–5.3)
POTASSIUM SERPL-SCNC: 3.9 MMOL/L (ref 3.4–5.3)
RBC # BLD AUTO: 4.45 10E12/L (ref 3.8–5.2)
RBC #/AREA URNS AUTO: 11 /HPF (ref 0–2)
SODIUM SERPL-SCNC: 136 MMOL/L (ref 133–144)
SOURCE: ABNORMAL
SP GR UR STRIP: 1.01 (ref 1–1.03)
SQUAMOUS #/AREA URNS AUTO: 9 /HPF (ref 0–1)
UROBILINOGEN UR STRIP-MCNC: NORMAL MG/DL (ref 0–2)
WBC # BLD AUTO: 16.4 10E9/L (ref 4–11)
WBC #/AREA URNS AUTO: 11 /HPF (ref 0–2)

## 2017-08-25 PROCEDURE — 83605 ASSAY OF LACTIC ACID: CPT | Performed by: EMERGENCY MEDICINE

## 2017-08-25 PROCEDURE — 25000132 ZZH RX MED GY IP 250 OP 250 PS 637: Performed by: PHYSICIAN ASSISTANT

## 2017-08-25 PROCEDURE — C2617 STENT, NON-COR, TEM W/O DEL: HCPCS | Performed by: UROLOGY

## 2017-08-25 PROCEDURE — 84132 ASSAY OF SERUM POTASSIUM: CPT | Performed by: HOSPITALIST

## 2017-08-25 PROCEDURE — 25000128 H RX IP 250 OP 636: Performed by: EMERGENCY MEDICINE

## 2017-08-25 PROCEDURE — 25800025 ZZH RX 258: Performed by: UROLOGY

## 2017-08-25 PROCEDURE — 25000132 ZZH RX MED GY IP 250 OP 250 PS 637: Performed by: INTERNAL MEDICINE

## 2017-08-25 PROCEDURE — 96375 TX/PRO/DX INJ NEW DRUG ADDON: CPT

## 2017-08-25 PROCEDURE — 36415 COLL VENOUS BLD VENIPUNCTURE: CPT

## 2017-08-25 PROCEDURE — 02HV33Z INSERTION OF INFUSION DEVICE INTO SUPERIOR VENA CAVA, PERCUTANEOUS APPROACH: ICD-10-PCS | Performed by: EMERGENCY MEDICINE

## 2017-08-25 PROCEDURE — 27210131 ZZH KIT ART LINE INSERTION

## 2017-08-25 PROCEDURE — 99291 CRITICAL CARE FIRST HOUR: CPT | Performed by: PHYSICIAN ASSISTANT

## 2017-08-25 PROCEDURE — 99285 EMERGENCY DEPT VISIT HI MDM: CPT | Mod: 25

## 2017-08-25 PROCEDURE — 25000125 ZZHC RX 250: Performed by: REGISTERED NURSE

## 2017-08-25 PROCEDURE — 96361 HYDRATE IV INFUSION ADD-ON: CPT

## 2017-08-25 PROCEDURE — 27210995 ZZH RX 272: Performed by: UROLOGY

## 2017-08-25 PROCEDURE — 96365 THER/PROPH/DIAG IV INF INIT: CPT

## 2017-08-25 PROCEDURE — 36000052 ZZH SURGERY LEVEL 2 EA 15 ADDTL MIN: Performed by: UROLOGY

## 2017-08-25 PROCEDURE — 87040 BLOOD CULTURE FOR BACTERIA: CPT | Performed by: EMERGENCY MEDICINE

## 2017-08-25 PROCEDURE — 99207 ZZC APP CREDIT; MD BILLING SHARED VISIT: CPT | Performed by: HOSPITALIST

## 2017-08-25 PROCEDURE — 36000050 ZZH SURGERY LEVEL 2 1ST 30 MIN: Performed by: UROLOGY

## 2017-08-25 PROCEDURE — 40000170 ZZH STATISTIC PRE-PROCEDURE ASSESSMENT II: Performed by: UROLOGY

## 2017-08-25 PROCEDURE — 71000016 ZZH RECOVERY PHASE 1 LEVEL 3 FIRST HR: Performed by: UROLOGY

## 2017-08-25 PROCEDURE — 80048 BASIC METABOLIC PNL TOTAL CA: CPT | Performed by: EMERGENCY MEDICINE

## 2017-08-25 PROCEDURE — 25000128 H RX IP 250 OP 636: Performed by: REGISTERED NURSE

## 2017-08-25 PROCEDURE — 36556 INSERT NON-TUNNEL CV CATH: CPT

## 2017-08-25 PROCEDURE — 99223 1ST HOSP IP/OBS HIGH 75: CPT | Mod: AI | Performed by: INTERNAL MEDICINE

## 2017-08-25 PROCEDURE — 0T768DZ DILATION OF RIGHT URETER WITH INTRALUMINAL DEVICE, VIA NATURAL OR ARTIFICIAL OPENING ENDOSCOPIC: ICD-10-PCS | Performed by: UROLOGY

## 2017-08-25 PROCEDURE — 37000009 ZZH ANESTHESIA TECHNICAL FEE, EACH ADDTL 15 MIN: Performed by: UROLOGY

## 2017-08-25 PROCEDURE — 12000000 ZZH R&B MED SURG/OB

## 2017-08-25 PROCEDURE — C1769 GUIDE WIRE: HCPCS | Performed by: UROLOGY

## 2017-08-25 PROCEDURE — 40000986 XR CHEST PORT 1 VW

## 2017-08-25 PROCEDURE — 81001 URINALYSIS AUTO W/SCOPE: CPT | Performed by: EMERGENCY MEDICINE

## 2017-08-25 PROCEDURE — 25000125 ZZHC RX 250: Performed by: INTERNAL MEDICINE

## 2017-08-25 PROCEDURE — 85025 COMPLETE CBC W/AUTO DIFF WBC: CPT | Performed by: EMERGENCY MEDICINE

## 2017-08-25 PROCEDURE — 25000128 H RX IP 250 OP 636: Performed by: PHYSICIAN ASSISTANT

## 2017-08-25 PROCEDURE — 37000008 ZZH ANESTHESIA TECHNICAL FEE, 1ST 30 MIN: Performed by: UROLOGY

## 2017-08-25 PROCEDURE — 25000132 ZZH RX MED GY IP 250 OP 250 PS 637: Performed by: HOSPITALIST

## 2017-08-25 PROCEDURE — 00000146 ZZHCL STATISTIC GLUCOSE BY METER IP

## 2017-08-25 PROCEDURE — 25000566 ZZH SEVOFLURANE, EA 15 MIN: Performed by: UROLOGY

## 2017-08-25 PROCEDURE — 40000277 XR SURGERY CARM FLUORO LESS THAN 5 MIN W STILLS

## 2017-08-25 PROCEDURE — 25000128 H RX IP 250 OP 636: Performed by: INTERNAL MEDICINE

## 2017-08-25 PROCEDURE — 93005 ELECTROCARDIOGRAM TRACING: CPT

## 2017-08-25 PROCEDURE — C1758 CATHETER, URETERAL: HCPCS | Performed by: UROLOGY

## 2017-08-25 PROCEDURE — 87086 URINE CULTURE/COLONY COUNT: CPT | Performed by: EMERGENCY MEDICINE

## 2017-08-25 PROCEDURE — 71000017 ZZH RECOVERY PHASE 1 LEVEL 3 EA ADDTL HR: Performed by: UROLOGY

## 2017-08-25 PROCEDURE — 25000128 H RX IP 250 OP 636: Performed by: ANESTHESIOLOGY

## 2017-08-25 PROCEDURE — 27210794 ZZH OR GENERAL SUPPLY STERILE: Performed by: UROLOGY

## 2017-08-25 DEVICE — STENT URETERAL DBL PIGTAIL INLAY 6FRX26CM 778626: Type: IMPLANTABLE DEVICE | Site: URETER | Status: FUNCTIONAL

## 2017-08-25 RX ORDER — FENTANYL CITRATE 50 UG/ML
INJECTION, SOLUTION INTRAMUSCULAR; INTRAVENOUS PRN
Status: DISCONTINUED | OUTPATIENT
Start: 2017-08-25 | End: 2017-08-25

## 2017-08-25 RX ORDER — DEXAMETHASONE SODIUM PHOSPHATE 4 MG/ML
INJECTION, SOLUTION INTRA-ARTICULAR; INTRALESIONAL; INTRAMUSCULAR; INTRAVENOUS; SOFT TISSUE PRN
Status: DISCONTINUED | OUTPATIENT
Start: 2017-08-25 | End: 2017-08-25

## 2017-08-25 RX ORDER — POTASSIUM CHLORIDE 1500 MG/1
20-40 TABLET, EXTENDED RELEASE ORAL
Status: DISCONTINUED | OUTPATIENT
Start: 2017-08-25 | End: 2017-08-28

## 2017-08-25 RX ORDER — PROCHLORPERAZINE 25 MG
25 SUPPOSITORY, RECTAL RECTAL EVERY 12 HOURS PRN
Status: DISCONTINUED | OUTPATIENT
Start: 2017-08-25 | End: 2017-08-29 | Stop reason: HOSPADM

## 2017-08-25 RX ORDER — BUPROPION HYDROCHLORIDE 150 MG/1
150 TABLET ORAL EVERY MORNING
Status: ON HOLD | COMMUNITY
End: 2017-08-25

## 2017-08-25 RX ORDER — GLYCOPYRROLATE 0.2 MG/ML
INJECTION, SOLUTION INTRAMUSCULAR; INTRAVENOUS PRN
Status: DISCONTINUED | OUTPATIENT
Start: 2017-08-25 | End: 2017-08-25

## 2017-08-25 RX ORDER — ACETAMINOPHEN 650 MG/1
650 SUPPOSITORY RECTAL EVERY 4 HOURS PRN
Status: DISCONTINUED | OUTPATIENT
Start: 2017-08-25 | End: 2017-08-29 | Stop reason: HOSPADM

## 2017-08-25 RX ORDER — SODIUM CHLORIDE 9 MG/ML
INJECTION, SOLUTION INTRAVENOUS CONTINUOUS
Status: DISCONTINUED | OUTPATIENT
Start: 2017-08-25 | End: 2017-08-25

## 2017-08-25 RX ORDER — HYDROMORPHONE HYDROCHLORIDE 1 MG/ML
.3-.5 INJECTION, SOLUTION INTRAMUSCULAR; INTRAVENOUS; SUBCUTANEOUS EVERY 5 MIN PRN
Status: DISCONTINUED | OUTPATIENT
Start: 2017-08-25 | End: 2017-08-25 | Stop reason: HOSPADM

## 2017-08-25 RX ORDER — PROPOFOL 10 MG/ML
INJECTION, EMULSION INTRAVENOUS PRN
Status: DISCONTINUED | OUTPATIENT
Start: 2017-08-25 | End: 2017-08-25

## 2017-08-25 RX ORDER — HYDROMORPHONE HYDROCHLORIDE 1 MG/ML
.3-.5 INJECTION, SOLUTION INTRAMUSCULAR; INTRAVENOUS; SUBCUTANEOUS
Status: DISCONTINUED | OUTPATIENT
Start: 2017-08-25 | End: 2017-08-29 | Stop reason: HOSPADM

## 2017-08-25 RX ORDER — ONDANSETRON 2 MG/ML
4 INJECTION INTRAMUSCULAR; INTRAVENOUS EVERY 6 HOURS PRN
Status: DISCONTINUED | OUTPATIENT
Start: 2017-08-25 | End: 2017-08-29 | Stop reason: HOSPADM

## 2017-08-25 RX ORDER — ONDANSETRON 4 MG/1
4 TABLET, ORALLY DISINTEGRATING ORAL EVERY 30 MIN PRN
Status: DISCONTINUED | OUTPATIENT
Start: 2017-08-25 | End: 2017-08-25

## 2017-08-25 RX ORDER — NALOXONE HYDROCHLORIDE 0.4 MG/ML
.1-.4 INJECTION, SOLUTION INTRAMUSCULAR; INTRAVENOUS; SUBCUTANEOUS
Status: DISCONTINUED | OUTPATIENT
Start: 2017-08-25 | End: 2017-08-29 | Stop reason: HOSPADM

## 2017-08-25 RX ORDER — CARBOXYMETHYLCELLULOSE SODIUM 5 MG/ML
1 SOLUTION/ DROPS OPHTHALMIC 2 TIMES DAILY PRN
Status: DISCONTINUED | OUTPATIENT
Start: 2017-08-25 | End: 2017-08-29 | Stop reason: HOSPADM

## 2017-08-25 RX ORDER — POTASSIUM CHLORIDE 1.5 G/1.58G
20-40 POWDER, FOR SOLUTION ORAL
Status: DISCONTINUED | OUTPATIENT
Start: 2017-08-25 | End: 2017-08-28

## 2017-08-25 RX ORDER — ONDANSETRON 4 MG/1
4 TABLET, ORALLY DISINTEGRATING ORAL EVERY 6 HOURS PRN
Status: DISCONTINUED | OUTPATIENT
Start: 2017-08-25 | End: 2017-08-25

## 2017-08-25 RX ORDER — FENTANYL CITRATE 50 UG/ML
25-50 INJECTION, SOLUTION INTRAMUSCULAR; INTRAVENOUS EVERY 5 MIN PRN
Status: DISCONTINUED | OUTPATIENT
Start: 2017-08-25 | End: 2017-08-25 | Stop reason: HOSPADM

## 2017-08-25 RX ORDER — NALOXONE HYDROCHLORIDE 0.4 MG/ML
.1-.4 INJECTION, SOLUTION INTRAMUSCULAR; INTRAVENOUS; SUBCUTANEOUS
Status: DISCONTINUED | OUTPATIENT
Start: 2017-08-25 | End: 2017-08-25

## 2017-08-25 RX ORDER — ONDANSETRON 4 MG/1
4 TABLET, ORALLY DISINTEGRATING ORAL EVERY 6 HOURS PRN
Status: DISCONTINUED | OUTPATIENT
Start: 2017-08-25 | End: 2017-08-29 | Stop reason: HOSPADM

## 2017-08-25 RX ORDER — LIDOCAINE HYDROCHLORIDE 20 MG/ML
INJECTION, SOLUTION INFILTRATION; PERINEURAL PRN
Status: DISCONTINUED | OUTPATIENT
Start: 2017-08-25 | End: 2017-08-25

## 2017-08-25 RX ORDER — LEVOFLOXACIN 5 MG/ML
500 INJECTION, SOLUTION INTRAVENOUS EVERY 24 HOURS
Status: DISCONTINUED | OUTPATIENT
Start: 2017-08-25 | End: 2017-08-25

## 2017-08-25 RX ORDER — KETOROLAC TROMETHAMINE 15 MG/ML
15 INJECTION, SOLUTION INTRAMUSCULAR; INTRAVENOUS ONCE
Status: DISCONTINUED | OUTPATIENT
Start: 2017-08-25 | End: 2017-08-25

## 2017-08-25 RX ORDER — SODIUM CHLORIDE 9 MG/ML
1000 INJECTION, SOLUTION INTRAVENOUS CONTINUOUS
Status: DISCONTINUED | OUTPATIENT
Start: 2017-08-25 | End: 2017-08-25

## 2017-08-25 RX ORDER — ONDANSETRON 2 MG/ML
4 INJECTION INTRAMUSCULAR; INTRAVENOUS EVERY 30 MIN PRN
Status: DISCONTINUED | OUTPATIENT
Start: 2017-08-25 | End: 2017-08-25

## 2017-08-25 RX ORDER — SODIUM CHLORIDE, SODIUM LACTATE, POTASSIUM CHLORIDE, CALCIUM CHLORIDE 600; 310; 30; 20 MG/100ML; MG/100ML; MG/100ML; MG/100ML
INJECTION, SOLUTION INTRAVENOUS CONTINUOUS
Status: DISCONTINUED | OUTPATIENT
Start: 2017-08-25 | End: 2017-08-25 | Stop reason: HOSPADM

## 2017-08-25 RX ORDER — ONDANSETRON 2 MG/ML
4 INJECTION INTRAMUSCULAR; INTRAVENOUS ONCE
Status: DISCONTINUED | OUTPATIENT
Start: 2017-08-25 | End: 2017-08-25 | Stop reason: HOSPADM

## 2017-08-25 RX ORDER — ACETAMINOPHEN 325 MG/1
650 TABLET ORAL EVERY 4 HOURS PRN
Status: DISCONTINUED | OUTPATIENT
Start: 2017-08-25 | End: 2017-08-29 | Stop reason: HOSPADM

## 2017-08-25 RX ORDER — POTASSIUM CHLORIDE 7.45 MG/ML
10 INJECTION INTRAVENOUS
Status: DISCONTINUED | OUTPATIENT
Start: 2017-08-25 | End: 2017-08-28

## 2017-08-25 RX ORDER — OXYBUTYNIN CHLORIDE 10 MG/1
10 TABLET, EXTENDED RELEASE ORAL DAILY
COMMUNITY

## 2017-08-25 RX ORDER — SODIUM CHLORIDE 9 MG/ML
INJECTION, SOLUTION INTRAVENOUS CONTINUOUS
Status: DISCONTINUED | OUTPATIENT
Start: 2017-08-25 | End: 2017-08-27

## 2017-08-25 RX ORDER — POTASSIUM CL/LIDO/0.9 % NACL 10MEQ/0.1L
10 INTRAVENOUS SOLUTION, PIGGYBACK (ML) INTRAVENOUS
Status: DISCONTINUED | OUTPATIENT
Start: 2017-08-25 | End: 2017-08-28

## 2017-08-25 RX ORDER — LEVOFLOXACIN 5 MG/ML
750 INJECTION, SOLUTION INTRAVENOUS ONCE
Status: COMPLETED | OUTPATIENT
Start: 2017-08-25 | End: 2017-08-25

## 2017-08-25 RX ORDER — ERTAPENEM 1 G/1
1 INJECTION, POWDER, LYOPHILIZED, FOR SOLUTION INTRAMUSCULAR; INTRAVENOUS ONCE
Status: DISCONTINUED | OUTPATIENT
Start: 2017-08-25 | End: 2017-08-25

## 2017-08-25 RX ORDER — FERROUS SULFATE 325(65) MG
325 TABLET ORAL
COMMUNITY

## 2017-08-25 RX ORDER — PROCHLORPERAZINE MALEATE 5 MG
5-10 TABLET ORAL EVERY 6 HOURS PRN
Status: DISCONTINUED | OUTPATIENT
Start: 2017-08-25 | End: 2017-08-29 | Stop reason: HOSPADM

## 2017-08-25 RX ORDER — POTASSIUM CHLORIDE 29.8 MG/ML
20 INJECTION INTRAVENOUS ONCE
Status: DISCONTINUED | OUTPATIENT
Start: 2017-08-25 | End: 2017-08-25 | Stop reason: HOSPADM

## 2017-08-25 RX ORDER — ONDANSETRON 2 MG/ML
4 INJECTION INTRAMUSCULAR; INTRAVENOUS EVERY 6 HOURS PRN
Status: DISCONTINUED | OUTPATIENT
Start: 2017-08-25 | End: 2017-08-25

## 2017-08-25 RX ORDER — CARBOXYMETHYLCELLULOSE SODIUM 5 MG/ML
SOLUTION/ DROPS OPHTHALMIC PRN
COMMUNITY

## 2017-08-25 RX ORDER — NEOSTIGMINE METHYLSULFATE 1 MG/ML
VIAL (ML) INJECTION PRN
Status: DISCONTINUED | OUTPATIENT
Start: 2017-08-25 | End: 2017-08-25

## 2017-08-25 RX ORDER — TAMSULOSIN HYDROCHLORIDE 0.4 MG/1
0.4 CAPSULE ORAL DAILY
Status: DISCONTINUED | OUTPATIENT
Start: 2017-08-25 | End: 2017-08-29 | Stop reason: HOSPADM

## 2017-08-25 RX ORDER — ERTAPENEM 1 G/1
1 INJECTION, POWDER, LYOPHILIZED, FOR SOLUTION INTRAMUSCULAR; INTRAVENOUS EVERY 24 HOURS
Status: DISCONTINUED | OUTPATIENT
Start: 2017-08-26 | End: 2017-08-26

## 2017-08-25 RX ORDER — POTASSIUM CHLORIDE 29.8 MG/ML
20 INJECTION INTRAVENOUS
Status: DISCONTINUED | OUTPATIENT
Start: 2017-08-25 | End: 2017-08-28

## 2017-08-25 RX ADMIN — POTASSIUM CHLORIDE 20 MEQ: 1500 TABLET, EXTENDED RELEASE ORAL at 20:36

## 2017-08-25 RX ADMIN — MIDAZOLAM HYDROCHLORIDE 1 MG: 1 INJECTION, SOLUTION INTRAMUSCULAR; INTRAVENOUS at 06:23

## 2017-08-25 RX ADMIN — LIDOCAINE HYDROCHLORIDE 100 MG: 20 INJECTION, SOLUTION INFILTRATION; PERINEURAL at 06:09

## 2017-08-25 RX ADMIN — HYDROMORPHONE HYDROCHLORIDE 0.5 MG: 1 INJECTION, SOLUTION INTRAMUSCULAR; INTRAVENOUS; SUBCUTANEOUS at 07:52

## 2017-08-25 RX ADMIN — SODIUM CHLORIDE: 9 INJECTION, SOLUTION INTRAVENOUS at 14:07

## 2017-08-25 RX ADMIN — NEOSTIGMINE METHYLSULFATE 4 MG: 1 INJECTION INTRAMUSCULAR; INTRAVENOUS; SUBCUTANEOUS at 06:35

## 2017-08-25 RX ADMIN — SODIUM CHLORIDE 1000 ML: 9 INJECTION, SOLUTION INTRAVENOUS at 01:49

## 2017-08-25 RX ADMIN — VANCOMYCIN HYDROCHLORIDE 1500 MG: 5 INJECTION, POWDER, LYOPHILIZED, FOR SOLUTION INTRAVENOUS at 14:06

## 2017-08-25 RX ADMIN — SUCCINYLCHOLINE CHLORIDE 80 MG: 20 INJECTION, SOLUTION INTRAMUSCULAR; INTRAVENOUS at 06:09

## 2017-08-25 RX ADMIN — NOREPINEPHRINE BITARTRATE 0.03 MCG/KG/MIN: 1 INJECTION INTRAVENOUS at 07:30

## 2017-08-25 RX ADMIN — SODIUM CHLORIDE 1000 ML: 9 INJECTION, SOLUTION INTRAVENOUS at 03:29

## 2017-08-25 RX ADMIN — HYDROMORPHONE HYDROCHLORIDE 0.3 MG: 1 INJECTION, SOLUTION INTRAMUSCULAR; INTRAVENOUS; SUBCUTANEOUS at 11:01

## 2017-08-25 RX ADMIN — HYDROMORPHONE HYDROCHLORIDE 0.4 MG: 1 INJECTION, SOLUTION INTRAMUSCULAR; INTRAVENOUS; SUBCUTANEOUS at 23:51

## 2017-08-25 RX ADMIN — SODIUM CHLORIDE: 9 INJECTION, SOLUTION INTRAVENOUS at 06:05

## 2017-08-25 RX ADMIN — LEVOFLOXACIN 750 MG: 5 INJECTION, SOLUTION INTRAVENOUS at 02:05

## 2017-08-25 RX ADMIN — ACETAMINOPHEN 650 MG: 650 SUPPOSITORY RECTAL at 07:15

## 2017-08-25 RX ADMIN — MIDAZOLAM HYDROCHLORIDE 1 MG: 1 INJECTION, SOLUTION INTRAMUSCULAR; INTRAVENOUS at 06:09

## 2017-08-25 RX ADMIN — SODIUM CHLORIDE: 9 INJECTION, SOLUTION INTRAVENOUS at 02:44

## 2017-08-25 RX ADMIN — HYDROMORPHONE HYDROCHLORIDE 0.5 MG: 1 INJECTION, SOLUTION INTRAMUSCULAR; INTRAVENOUS; SUBCUTANEOUS at 20:45

## 2017-08-25 RX ADMIN — ACETAMINOPHEN 650 MG: 325 TABLET, FILM COATED ORAL at 14:21

## 2017-08-25 RX ADMIN — Medication 2 LOZENGE: at 18:32

## 2017-08-25 RX ADMIN — FENTANYL CITRATE 25 MCG: 50 INJECTION, SOLUTION INTRAMUSCULAR; INTRAVENOUS at 06:43

## 2017-08-25 RX ADMIN — ONDANSETRON 4 MG: 2 SOLUTION INTRAMUSCULAR; INTRAVENOUS at 01:55

## 2017-08-25 RX ADMIN — PROPOFOL 140 MG: 10 INJECTION, EMULSION INTRAVENOUS at 06:09

## 2017-08-25 RX ADMIN — POTASSIUM CHLORIDE 20 MEQ: 29.8 INJECTION, SOLUTION INTRAVENOUS at 15:17

## 2017-08-25 RX ADMIN — TAMSULOSIN HYDROCHLORIDE 0.4 MG: 0.4 CAPSULE ORAL at 11:11

## 2017-08-25 RX ADMIN — FENTANYL CITRATE 25 MCG: 50 INJECTION, SOLUTION INTRAMUSCULAR; INTRAVENOUS at 06:09

## 2017-08-25 RX ADMIN — VANCOMYCIN HYDROCHLORIDE 2000 MG: 10 INJECTION, POWDER, LYOPHILIZED, FOR SOLUTION INTRAVENOUS at 03:35

## 2017-08-25 RX ADMIN — ROCURONIUM BROMIDE 25 MG: 10 INJECTION INTRAVENOUS at 06:19

## 2017-08-25 RX ADMIN — POTASSIUM CHLORIDE 20 MEQ: 29.8 INJECTION, SOLUTION INTRAVENOUS at 14:05

## 2017-08-25 RX ADMIN — ACETAMINOPHEN 650 MG: 325 TABLET, FILM COATED ORAL at 18:19

## 2017-08-25 RX ADMIN — GLYCOPYRROLATE 0.6 MG: 0.2 INJECTION, SOLUTION INTRAMUSCULAR; INTRAVENOUS at 06:35

## 2017-08-25 RX ADMIN — HYDROMORPHONE HYDROCHLORIDE 1 MG: 1 INJECTION, SOLUTION INTRAMUSCULAR; INTRAVENOUS; SUBCUTANEOUS at 01:55

## 2017-08-25 RX ADMIN — DEXAMETHASONE SODIUM PHOSPHATE 4 MG: 4 INJECTION, SOLUTION INTRA-ARTICULAR; INTRALESIONAL; INTRAMUSCULAR; INTRAVENOUS; SOFT TISSUE at 06:33

## 2017-08-25 RX ADMIN — PHENYLEPHRINE HYDROCHLORIDE 100 MCG: 10 INJECTION, SOLUTION INTRAMUSCULAR; INTRAVENOUS; SUBCUTANEOUS at 06:27

## 2017-08-25 RX ADMIN — ERTAPENEM SODIUM 1 G: 1 INJECTION, POWDER, LYOPHILIZED, FOR SOLUTION INTRAMUSCULAR; INTRAVENOUS at 05:24

## 2017-08-25 ASSESSMENT — ENCOUNTER SYMPTOMS
FLANK PAIN: 1
DIAPHORESIS: 1
HEADACHES: 1
FEVER: 1
VOMITING: 1
APPETITE CHANGE: 1

## 2017-08-25 NOTE — H&P
PRIMARY CARE PHYSICIAN:  None.      CHIEF COMPLAINT:  Right-sided flank pain.      HISTORY:  Ayesha Pereyra is a 38-year-old  female who does not speak any English who presented to [facility] yesterday with right-sided flank pain.  She has prior history of kidney stones was diagnosed with a 4 mm right UVJ stone on her prior ER visit.  She has got allergies to Keflex.  She was discharged with some Macrobid and to push fluids and it was felt that the patient may be able to push the 4 mm stone through and ask for followup with the patient's primary care physician.  The patient returned within 24 hours with fever and worsening right flank pain, vomiting and rigors.      In the emergency room, the patient was seen by Dr. Benitez Cline.  The patient had normal electrolytes, normal kidney function, white count, repeated now was elevated from 12,005 to 16,400 and hemoglobin decreased from 11.8 to 10.5, platelet count also dropped from 194 to 140.  She has a left shift.  Blood cultures were obtained.  Glucose is 123.  She was initially treated with vancomycin and levofloxacin.  The patient with IV fluids and pain medications did seem to stabilize.  Her blood pressure did improve slightly however, the patient's blood pressure once again tanked.  She had a fever of 103.5 and became hypotensive again.  She required a central line placement in the Emergency Department.  By the time the patient was stabilized, the patient was ready to go to the OR for a stent placement.  The patient is pending transfer to either the Intensive Care Unit, depending on her clinical status and whether she is able to be extubated.      PAST MEDICAL HISTORY:    History of kidney stones.      PAST SURGICAL HISTORY:  Hysterectomy.      FAMILY HISTORY:  Reviewed and negative.      SOCIAL HISTORY:  She is , here with a friend.  No tobacco or alcohol.      ALLERGIES:  Keflex, unknown reaction.      CURRENT MEDICATIONS:    1.  Tylenol 500 mg 1-2 tablets every 6 hours.   2.  Norco 1-2 tabs every 6 hours.   3.  Ibuprofen 800 mg every 8 hours as needed.   4.  Nitrofurantoin 100 mg twice a day.   5.  Flomax 0.4 mg once a day.   6.  Ambien 10 mg daily.      REVIEW OF SYSTEMS:  A 10-point reviewed, positive for right flank pain, nausea, vomiting, fever.  Denies any chest pain, cough or diarrhea.  Otherwise, 10-point review of systems reviewed.      PHYSICAL EXAMINATION:   VITAL SIGNS:  Temperature 102.7, heart rate 84, respirations 16, blood pressure 84/36, sats 99%.   GENERAL:  The patient is a 46-year-old  female.  She is oriented x3, able to speak with the .   HEENT:  Unremarkable.  Mucous membranes are moist.   LUNGS:  Clear to auscultation.   CARDIOVASCULAR:  S1, S2, regular rate and rhythm.   ABDOMEN:  Soft.  Positive right flank discomfort and right-sided CVA tenderness.   EXTREMITIES:  No edema.   SKIN:  Perfused.   NEUROLOGIC:  She is oriented x3, able to move all 4 extremities.      LABORATORY DATA:  As dictated in history of present illness.      ASSESSMENT:  Ayesha Pereyra was diagnosed with kidney stones a little over 24 hours ago with a 4 mm right UVJ stone now returns septic with suspected right-sided kidney infection being admitted for stent placement and antibiotics and further stabilization.      PLAN:   1.  Sepsis secondary to right UVJ stone in distal ureter causing moderate hydronephrosis.  The patient will be monitored in a higher acute setting either an IMC or ICU depending on clinical course.  She just underwent a stent placement by Dr. Grady from urology.  The patient will be continued on Invanz.  She did receive vancomycin already in the ED.  She is allergic to Keflex.  Blood cultures have been obtained.  The patient has a central line in place and may need pressors.  She was prescribed Flomax but is unclear whether she took any.   2.  Deep venous thrombosis prophylaxis.  The patient will  have compression boots.      CODE STATUS:  Full.         RACHEL SIMS MD             D: 2017 07:29   T: 2017 07:53   MT:       Name:     HENRY SERNA   MRN:      [id number]        Account:      [id number]   :      1970           Admitted:     [id number]      Document: [id number]

## 2017-08-25 NOTE — ANESTHESIA PROCEDURE NOTES
ARTERIAL LINE PROCEDURE NOTE:   Pre-Procedure  Performed by DINAH BLANTON  Location: post-op      Pre-Anesthestic Checklist: patient identified, IV checked, risks and benefits discussed, informed consent, monitors and equipment checked, pre-op evaluation and at physician/surgeon's request    Timeout  Correct Patient: Yes   Correct Procedure: Yes   Correct Site: Yes   Correct Laterality: N/A   Correct Position: Yes   Site Marked: N/A   .   Procedure Documentation  Procedure: arterial line    Supine  Insertion Site:left, radial.Skin infiltrated with mL of 1% lidocaine. Injection technique: Seldinger Technique and ultrasound guided  .  .  Patient Prep;chlorhexidine gluconate and isopropyl alcohol    Assessment/Narrative    Catheter: 20 gauge, 1.75 in/4.5 cm quick cath (integral wire)     Secured by other  Tegaderm dressing used.    Arterial waveform: Yes     Comments:  No complications. Arterial line secured further with tape.

## 2017-08-25 NOTE — BRIEF OP NOTE
[facility] Brief Operative Note    Pre-operative diagnosis: Right UVJ stone, urosepsis   Post-operative diagnosis same   Procedure: Procedure(s):  Cystoscopy,  Right  Ureteral Stent Placement - Wound Class: II-Clean Contaminated   Surgeon(s): Surgeon(s) and Role:     * Ousmane Grady MD - Primary   Estimated blood loss: 0 mL    Specimens: * No specimens in log *   Findings: Purulent urine drained from right kidney

## 2017-08-25 NOTE — ANESTHESIA PREPROCEDURE EVALUATION
Anesthesia Evaluation     .             ROS/MED HX    ENT/Pulmonary:      (-) sleep apnea   Neurologic:       Cardiovascular:         METS/Exercise Tolerance:     Hematologic:         Musculoskeletal:         GI/Hepatic:        (-) GERD   Renal/Genitourinary:     (+) Nephrolithiasis ,       Endo:     (+) Obesity, .      Psychiatric:         Infectious Disease:         Malignancy:         Other: Comment: Hypotensive in OR;                     Physical Exam  Normal systems: cardiovascular, pulmonary and dental    Airway   Mallampati: II  TM distance: <3 FB  Neck ROM: full    Dental     Cardiovascular   Rhythm and rate: regular and normal      Pulmonary                     Anesthesia Plan      History & Physical Review  History and physical reviewed and following examination; no interval change.    ASA Status:  2 emergent.    NPO Status:  > 8 hours    Plan for General, RSI and ETT with Intravenous induction. Maintenance will be Balanced.    PONV prophylaxis:  Ondansetron (or other 5HT-3) and Dexamethasone or Solumedrol  Has central line in place; a-line set up in room;       Postoperative Care  Postoperative pain management:  IV analgesics.      Consents  Anesthetic plan, risks, benefits and alternatives discussed with:  Patient..                          .

## 2017-08-25 NOTE — ANESTHESIA CARE TRANSFER NOTE
Patient: Ayesha Pereyra    Procedure(s):  Cystoscopy,    Ureteral Stent Placement - Wound Class: II-Clean Contaminated    Diagnosis: for Stent Placement  Diagnosis Additional Information: No value filed.    Anesthesia Type:   General, RSI, ETT     Note:  Airway :Face Mask  Patient transferred to:PACU  Comments: Transferred to PACU, spontaneous respirations, 10L oxygen via facemask.  All monitors and alarms on and functioning, VSS.  Patient awake, comfortable.  Report to PACU RN.      Vitals: (Last set prior to Anesthesia Care Transfer)    CRNA VITALS  8/25/2017 0614 - 8/25/2017 0650      8/25/2017             Pulse: 100    SpO2: 91 %    Resp Rate (set): 10                Electronically Signed By: LIU Rodriguez CRNA  August 25, 2017  6:50 AM

## 2017-08-25 NOTE — OP NOTE
DATE OF PROCEDURE:  08/25/2017       PREOPERATIVE DIAGNOSES:     1.  4 mm right ureterovesical junction stone.   2.  Urosepsis.      POSTOPERATIVE DIAGNOSES:     1.  4 mm right ureterovesical junction stone.   2.  Urosepsis.      PROCEDURES:  Cystoscopy, right ureteral stent placement.      COMPLICATIONS:  None.      BLOOD LOSS:  None.      ANESTHESIA:  General.      INDICATIONS:  Ayesha Pereyra is a 46-year-old Estonian-speaking female who was seen in the emergency room at [facility] on 08/23/2017 for lower abdominal pain and was found to have a 4 mm stone.  This was managed conservatively.  She presented back to the ER early this morning with recurrent pain and now fevers.  She was initially stable, but then the ER physician felt that she is declining, so I saw her for urgent Urology consult.  At the time of evaluation, she was hemodynamically stable, but was developing rigors and her blood pressure was becoming more labile.  She was added on emergently for cystoscopy and stent placement.      DESCRIPTION OF PROCEDURE:  After informed consent was obtained using a Estonian phone , the patient was taken to the Urology operative suite.  She was positioned supine and general anesthesia was induced.  She was converted to lithotomy and was prepped and draped in standard fashion.  She had been given a dose of 1 gram IV Invanz just prior to the case.  A 22-St Helenian cystoscope was placed into the bladder.  There was some hyperemia of the bladder consistent with cystitis.  The right ureteral orifice was identified.  A straight tipped sensor wire was placed up into the kidney under fluoroscopy.  Along the wire, a 6 St Helenian x 26 cm stent was placed in standard fashion.  There was a nice curl on both ends, and the right-sided stent appeared to be in good position.  Upon placing the stent, there was some cloudy-appearing urine draining from the right kidney.  The scope was removed and a Biswas catheter was  placed.  She was awoken and taken to recovery in guarded condition and will be transferred to the ICU.      PLAN:  Our service will co-manage her with the Medicine Service and Intensive Care Unit.  We will plan to repeat a computerized tomography scan in a week or two to see if her stone has passed.         GUERRERO MICHELLE MD             D: 2017 06:59   T: 2017 12:02   MT: [id number]      Name:     HENRY SERNA   MRN:      [id number]        Account:        [id number]   :      1970           Procedure Date: 2017      Document: [id number]

## 2017-08-25 NOTE — IP AVS SNAPSHOT
MRN:[id number]                      After Visit Summary   8/25/2017    Ayesha Pereyra    MRN: [id number]           Thank you!     Thank you for choosing [facility] for your care. Our goal is always to provide you with excellent care. Hearing back from our patients is one way we can continue to improve our services. Please take a few minutes to complete the written survey that you may receive in the mail after you visit with us. Thank you!        Patient Information     Date Of Birth          1970        About your hospital stay     You were admitted on:  August 25, 2017 You last received care in the:  [place] [address] [facility]    You were discharged on:  August 29, 2017        Reason for your hospital stay       Severe infection due to kidney stone - a stent was placed                  Who to Call     For medical emergencies, please call 911.  For non-urgent questions about your medical care, please call your primary care provider or clinic, None  For questions related to your surgery, please call your surgery clinic        Attending Provider     Provider Specialty    Benitez Cline MD Emergency Medicine    [facility], Josh Ross MD Internal Medicine       Primary Care Provider    Physician No Ref-Primary      After Care Instructions     Activity       Your activity upon discharge: activity as tolerated            Diet       Follow this diet upon discharge: Regular diet            Discharge Instructions       You have a right ureteral stent to help allow drainage from your kidney to bladder. You should increase your fluid intake while you have a stent in place to ensure good drainage of urine.   Your stent is not permanent, it requires routine exchanges or removal with your urologist based on your plan of care.  While the stent is in place you may notice:  -Flank / back/ side  pain on the same side of your body as the stent  -Blood tinged urine that may come and go  -Sensation  that you need to urinate more frequently   -Bladder cramping/discomfort at the end of urination  These are all normal to experience with the stent in place. However, if these symptoms become too bothersome to tolerate, please alert your urologist.                  Follow-up Appointments     Follow Up       Dr. Grady's surgery scheduler, Rosalba, is in the process of scheduling your procedure to remove the kidney stone. Please allow her 3-4 business days to schedule the procedure and then call you at home. If you have not heard from her after that time, you can call Rosalba at [phone] to inquire about when it will be scheduled. Rosalba is aware that you will benefit from a [place]er Speaking  for contact via phone and in office.     [facility]  [address], [address]  [place]. [address]            Follow-up and recommended labs and tests        Follow up with primary care provider within 2 weeks for hospital follow up.  No follow up labs or test are needed.                  Your next 10 appointments already scheduled     Sep 05, 2017  9:50 AM CDT   Office Visit with Nicole Joy Siegler, PA-C   [facility]    [address]  [address]  [place] [address] [phone]           Bring a current list of meds and any records pertaining to this visit. For Physicals, please bring immunization records and any forms needing to be filled out. Please arrive 10 minutes early to complete paperwork.              Further instructions from your care team       The inpatient Financial Office will attempt to meet with you prior to your hospital discharge.  However, this is the   Central Billing Office Phone # [phone], if you have questions about your hospital bill or your Medical/ Financial concerns.   This line should offer the choice to speak w/ a  whom is [place]er Speaking.  "    Pending Results     Date and Time Order Name Status Description    2017 013 Blood culture Preliminary     2017 013 Blood culture Preliminary             Statement of Approval     Ordered          17 0809  I have reviewed and agree with all the recommendations and orders detailed in this document.  EFFECTIVE NOW     Approved and electronically signed by:  Donna Matamoros MD             Admission Information     Date & Time Provider Department Dept. Phone    2017 Josh Barhnart MD [place] [address] [facility] [phone]      Your Vitals Were     Blood Pressure Pulse Temperature Respirations Weight Last Period    125/57 (BP Location: Left arm) 72 98.1  F (36.7  C) (Oral) 18 95.5 kg (210 lb 8.6 oz) 2017    Pulse Oximetry BMI (Body Mass Index)                93% 33.98 kg/m2          MyChart Information     [facility] lets you send messages to your doctor, view your test results, renew your prescriptions, schedule appointments and more. To sign up, go to [email or web address] . Click on \"Log in\" on the left side of the screen, which will take you to the Welcome page. Then click on \"Sign up Now\" on the right side of the page.     You will be asked to enter the access code listed below, as well as some personal information. Please follow the directions to create your username and password.     Your access code is: [email or web address]  Expires: 2017 12:06 AM     Your access code will  in 90 days. If you need help or a new code, please call your [facility] clinic or [phone].        Care EveryWhere ID     This is your Care EveryWhere ID. This could be used by other organizations to access your [facility] medical records  [id number]        Equal Access to Services     [facility] : Milly Garcia, mehdi cueva, jose g chaudhary. So [facility] [phone].    ATENCIÓN: Si habla español, tiene a rios disposición " servicios gratuitos de asistencia lingüística. Keyla fuentes [phone].    We comply with applicable federal civil rights laws and [place] laws. We do not discriminate on the basis of race, color, national origin, age, disability sex, sexual orientation or gender identity.               Review of your medicines      START taking        Dose / Directions    ciprofloxacin 500 MG tablet   Commonly known as:  CIPRO   Indication:  Pyelonephritis, Infection of Blood or Tissues affecting the Whole Body   Used for:  Kidney stone on right side        Dose:  500 mg   Take 1 tablet (500 mg) by mouth 2 times daily for 11 days   Quantity:  22 tablet   Refills:  0       oxyCODONE 5 MG IR tablet   Commonly known as:  ROXICODONE   Used for:  Kidney stone on right side        Dose:  5 mg   Take 1 tablet (5 mg) by mouth every 6 hours as needed for severe pain   Quantity:  15 tablet   Refills:  0       saccharomyces boulardii 250 MG capsule   Commonly known as:  FLORASTOR   Used for:  Diarrhea, unspecified type        Dose:  250 mg   Take 1 capsule (250 mg) by mouth 2 times daily for 14 days   Quantity:  28 capsule   Refills:  0         CONTINUE these medicines which have NOT CHANGED        Dose / Directions    acetaminophen 500 MG tablet   Commonly known as:  TYLENOL        Dose:  500-1000 mg   Take 1-2 tablets by mouth every 6 hours as needed for pain.   Quantity:  1 tablet   Refills:  0       carboxymethylcellulose 0.5 % Soln ophthalmic solution   Commonly known as:  REFRESH PLUS        as needed for dry eyes   Refills:  0       ferrous sulfate 325 (65 FE) MG tablet   Commonly known as:  IRON   Indication:  Iron Deficiency Anemia Patients w/Chronic Kidney Disease        Dose:  325 mg   Take 325 mg by mouth daily (with breakfast)   Refills:  0       OMEPRAZOLE PO        Dose:  20 mg   Take 20 mg by mouth every morning   Refills:  0       oxybutynin 10 MG 24 hr tablet   Commonly known as:  DITROPAN-XL        Dose:  10 mg   Take 10 mg  by mouth daily   Refills:  0       tamsulosin 0.4 MG capsule   Commonly known as:  FLOMAX   Used for:  Calculus of kidney        Dose:  0.4 mg   Take 1 capsule (0.4 mg) by mouth daily   Quantity:  30 capsule   Refills:  0         STOP taking     HYDROcodone-acetaminophen 5-325 MG per tablet   Commonly known as:  NORCO           ibuprofen 200 MG tablet   Commonly known as:  ADVIL/MOTRIN           nitroFURantoin (macrocrystal-monohydrate) 100 MG capsule   Commonly known as:  MACROBID                Where to get your medicines      These medications were sent to [facility] [place] - [address]  [address], [place] [address]     Phone:  [phone]     ciprofloxacin 500 MG tablet    saccharomyces boulardii 250 MG capsule    tamsulosin 0.4 MG capsule         Some of these will need a paper prescription and others can be bought over the counter. Ask your nurse if you have questions.     Bring a paper prescription for each of these medications     oxyCODONE 5 MG IR tablet                Protect others around you: Learn how to safely use, store and throw away your medicines at www.disposemymeds.org.             Medication List: This is a list of all your medications and when to take them. Check marks below indicate your daily home schedule. Keep this list as a reference.      Medications           Morning Afternoon Evening Bedtime As Needed    acetaminophen 500 MG tablet   Commonly known as:  TYLENOL   Take 1-2 tablets by mouth every 6 hours as needed for pain.   Last time this was given:  650 mg on 8/28/2017  4:10 PM                                carboxymethylcellulose 0.5 % Soln ophthalmic solution   Commonly known as:  REFRESH PLUS   as needed for dry eyes                                ciprofloxacin 500 MG tablet   Commonly known as:  CIPRO   Take 1 tablet (500 mg) by mouth 2 times daily for 11 days   Last time this was given:  500 mg on 8/29/2017  8:05 AM                                 ferrous sulfate 325 (65 FE) MG tablet   Commonly known as:  IRON   Take 325 mg by mouth daily (with breakfast)                                OMEPRAZOLE PO   Take 20 mg by mouth every morning   Last time this was given:  20 mg on 8/29/2017  8:05 AM                                oxybutynin 10 MG 24 hr tablet   Commonly known as:  DITROPAN-XL   Take 10 mg by mouth daily                                oxyCODONE 5 MG IR tablet   Commonly known as:  ROXICODONE   Take 1 tablet (5 mg) by mouth every 6 hours as needed for severe pain   Last time this was given:  5 mg on 8/29/2017  8:14 AM                                saccharomyces boulardii 250 MG capsule   Commonly known as:  FLORASTOR   Take 1 capsule (250 mg) by mouth 2 times daily for 14 days   Last time this was given:  250 mg on 8/29/2017  8:05 AM                                tamsulosin 0.4 MG capsule   Commonly known as:  FLOMAX   Take 1 capsule (0.4 mg) by mouth daily   Last time this was given:  0.4 mg on 8/29/2017  8:05 AM

## 2017-08-25 NOTE — PROGRESS NOTES
Urology POD #0  S/P Cystoscopy, Right Ureteral Stent Placement with Dr. Grady 8/25/17    Exam conducted via   Subjective:  Pt found resting in bed. Pt reprots she's feeling ok, though she had some fever earlier this morning. Pt denies pain. Pt is tolerating the catheter and stent well.    Objective:  /58  Pulse 84  Temp 99.1  F (37.3  C) (Oral)  Resp 12  Wt 96.1 kg (211 lb 13.8 oz)  LMP 08/21/2017  SpO2 96%  BMI 34.2 kg/m2    Intake/Output Summary (Last 24 hours) at 08/25/17 1149  Last data filed at 08/25/17 1145   Gross per 24 hour   Intake              613 ml   Output              750 ml   Net             -137 ml     Labs:  ROUTINE IP LABS (Last four results)  BMP  Recent Labs  Lab 08/25/17  0140 08/23/17  2230    140   POTASSIUM 3.1* 3.8   CHLORIDE 104 106   AMOL 8.6 8.8   CO2 23 26   BUN 9 10   CR 0.77 0.85   * 99     CBC  Recent Labs  Lab 08/25/17  0140 08/23/17  2230   WBC 16.4* 12.5*   RBC 4.45 5.03   HGB 10.5* 11.8   HCT 32.5* 37.2   MCV 73* 74*   MCH 23.6* 23.5*   MCHC 32.3 31.7   RDW 19.9* 19.5*   * 194     Exam:  Gen: Alert and oriented, cooperative, NAD  Ab: soft, rounded, NTTP, no CVAT  : Catheter in place draining fortino urine.  Ext: Calves soft, nttp, no edema, no PCDs      Assessment/Plan:  S/P Cystoscopy, Right Ureteral Stent Placement with Dr. Grady 8/25/17   -Antibiotics, cares per IM, intensivist    -Await return of  for final Abx selection   -Pt will likely need 2 weeks of antibiotics, then will plan to schedule definitive stone removal procedure in 2-4 weeks once she's fully recovered   -Urology will follow peripherally until then   -Discussed with Dr. Hilton of the hospitalist service and JOSE Arnold of intensivist service    Discussed exam, lab work and plan with Dr. Grady  Please contact me with any questions or concerns.     Judy Singh PA-C  [facility]  Pager:  [phone]  After 4pm and on weekends, please call  [phone]

## 2017-08-25 NOTE — PROGRESS NOTES
Patient arrived to unit at 1450. Settled into room. BP soft 104/50. Temp 99.7. All other VSS. Denies pain. Will update oncoming RN and continue to monitor.

## 2017-08-25 NOTE — PLAN OF CARE
Problem: Individualization  Goal: Patient Preferences  Outcome: Improving  Patient transferred to 8th floor.  Report called and brought patient up.  Levophed off in the morning.  Patient pivoted to cart and off cart for transfer without difficulty.  Clear liquids taken without nausea after passing bedside swallow.  1 bag IV potassium replaced and a second given to 8th floor RN.  VSS, no other changes.

## 2017-08-25 NOTE — OR NURSING
0856HR - Pt transferred to ICU w/o complications. Pt stable w/normal WOB. Pt cares handed over to ICU RADHA Alfred. Pt denied any concerns when cares handed over.

## 2017-08-25 NOTE — ANESTHESIA POSTPROCEDURE EVALUATION
Patient: Ayesha Pereyra    Procedure(s):  Cystoscopy,  Right  Ureteral Stent Placement - Wound Class: II-Clean Contaminated    Diagnosis:for Stent Placement  Diagnosis Additional Information: No value filed.    Anesthesia Type:  General, RSI, ETT    Note:  Anesthesia Post Evaluation    Patient location during evaluation: PACU  Patient participation: Able to fully participate in evaluation  Level of consciousness: awake and alert  Pain management: adequate  Airway patency: patent  Cardiovascular status: acceptable  Respiratory status: acceptable  Hydration status: acceptable  PONV: none     Anesthetic complications: None    Comments: Report was given to the ICU staff.         Last vitals:  Vitals:    08/25/17 0810 08/25/17 0815 08/25/17 0820   BP: 113/58 120/55 116/58   Pulse:      Resp: 18 18 18   Temp: 38.6  C (101.5  F) 38.5  C (101.3  F) 38.4  C (101.1  F)   SpO2: 95% 96% 96%         Electronically Signed By: Naresh Mayers MD  August 25, 2017  8:31 AM

## 2017-08-25 NOTE — CONSULTS
[facility] [place]    History and Physical/ Consult  Critical Care Service     Date of Admission:  8/25/2017  Date of Service (when I saw the patient): 08/25/17    Assessment & Plan   Ayesha Pereyra is a 46 year old female with PMHx of nephrolithiasis who presents on 8/25/17 with right sided flank pain, found to have 4mm R kidney stone, moderate hydronephrosis and presumed urosepsis, now s/p ureteral stent placement. Remains in the ICU on low dose levophed.     Neuro  1. Acute pain  Plan:  -- dilaudid 0.3-0.5 PRN     CV  1. Hypotension 2/2 presumed urosepsis   Plan:  -- received 3L fluid resuscitation in ED prior to stent placement  -- requiring low dose intermittent yasmeen during procedure, now requiring levophed gtt  -- continue with mIVF for volume, wean levophed gtt as able     Resp:  1. Post-operative ventilator management, extubated in PACU  Plan:  -- wean O2 requirements as able     GI/Nutrition  1. No prior hx  Plan:  -- NPO fpr now, likely can advance diet as able if able to wean levophed     Renal  1. 4mm UVJ stone in the distal ureter causing moderate right sided hydroneprhosis s/p right ureteral stent placement 8/25/17  2. Hypokalemia   Plan:  -- Cr and UOP have been stable  -- Urology following, plan for follow up for stent removal   -- monitor Cr and UOP  -- potassium repletion protocol   -- continue flomax     ID  1. ?Urosepsis in setting of R nephrolithiasis and moderate hydronephrosis   Plan:  -- drainage of purulent fluid from right kidney   -- started on ertapenem and levofloxacin   -- intra-op vanco completed  -- F/U blood and urine culture results   -- likely will need 2 weeks of abx    Endocrine  1. No active issues   Plan:  -- Keep BG  <180 for optimal healing    Heme:  1. Acute blood loss anemia  2. Thrombocytopenia, likely secondary to sepsis   Plan:  -- Hgb 11.8-->10.5, EBL 0 per report  -- Monitor hemoglobin.  -- Transfuse to keep > 7.0    General cares:  DVT Prophylaxis:  Pneumatic Compression Devices  GI Prophylaxis: Not indicated  Restraints: Restraints for medical healing needed: NO  Family update by me today: Yes   Current lines are required for patient management  Access:  R IJ   arterial line     Peace Arnold    Time Spent on this Encounter   Billing:  I spent  45  minutes bedside and on the inpatient unit today managing the critical care of Ayesha Pereyra in relation to the issues listed in this note.    Code Status   Full Code    Primary Care Physician   Physician No Ref-Primary    Chief Complaint   R flank pain     History is obtained from the patient and electronic health record    History of Present Illness   Ayesha Pereyra is a 46 year old female with PMHx of nephrolithiasis who presents on 8/25/17 with right sided flank pain, found to have 4mm R kidney stone, moderate hydronephrosis and presumed urosepsis. Patient has a history of kidney stones. In the ED, the patient had leukocytosis and thrombocytopenia. She was hypotensive but blood pressure improved after 3L of fluid boluses. The patient was then sent to the OR for stent placement.     The patient had a successful cystoscopy and R ureteral stent placement on 8/25/17. No marked EBL. Required intermittent yasmeen intra-op. Extubated in the PACU. Became increasingly hypotensive in PACU and was started on levophed. ROS somewhat limited with language barrier, but with the use of , the patient denies any flank pain currently. She denies SOB, abdominal pain, or chest pain.     Past Medical History    I have reviewed this patient's medical history and updated it with pertinent information if needed.   History reviewed. No pertinent past medical history.    Past Surgical History   I have reviewed this patient's surgical history and updated it with pertinent information if needed.  Past Surgical History:   Procedure Laterality Date     GYN SURGERY       HYSTERECTOMY  1995       Prior to Admission  Medications   Prior to Admission Medications   Prescriptions Last Dose Informant Patient Reported? Taking?   HYDROcodone-acetaminophen (NORCO) 5-325 MG per tablet   No No   Sig: Take 1-2 tablets by mouth every 6 hours as needed for moderate to severe pain   acetaminophen (TYLENOL) 500 MG tablet   No No   Sig: Take 1-2 tablets by mouth every 6 hours as needed for pain.   ibuprofen (ADVIL,MOTRIN) 200 MG tablet   No No   Sig: Take 4 tablets by mouth every 8 hours as needed for pain.   ibuprofen (ADVIL/MOTRIN) 200 MG tablet   No No   Sig: Take 2 tablets (400 mg) by mouth every 8 hours as needed for pain   nitroFURantoin, macrocrystal-monohydrate, (MACROBID) 100 MG capsule   No No   Sig: Take 1 capsule (100 mg) by mouth 2 times daily   tamsulosin (FLOMAX) 0.4 MG capsule   No No   Sig: Take 1 capsule (0.4 mg) by mouth daily for 7 doses   zolpidem (AMBIEN) 10 MG tablet   No No   Sig: Take 1 tablet by mouth nightly as needed for sleep.      Facility-Administered Medications: None     Allergies   Allergies   Allergen Reactions     Keflex [Cephalexin]        Social History   I have reviewed this patient's social history and updated it with pertinent information if needed. Ayesha Pereyra  reports that she has never smoked. She has never used smokeless tobacco. She reports that she does not drink alcohol or use illicit drugs.    Family History   I have reviewed this patient's family history and updated it with pertinent information if needed.   No family history on file.    Review of Systems   The 10 point Review of Systems is negative other than noted in the HPI     Physical Exam   Temp: 99.1  F (37.3  C) Temp src: Oral Temp  Min: 98.9  F (37.2  C)  Max: 103.5  F (39.7  C) BP: 116/58 Pulse: 84 Heart Rate: 81 Resp: 12 SpO2: 96 % O2 Device: Nasal cannula Oxygen Delivery: 4 LPM  Vital Signs with Ranges  Temp:  [98.9  F (37.2  C)-103.5  F (39.7  C)] 99.1  F (37.3  C)  Pulse:  [77-84] 84  Heart Rate:  [70-92] 81  Resp:   [12-25] 12  BP: ()/(36-70) 116/58  MAP:  [75 mmHg-83 mmHg] 83 mmHg  Arterial Line BP: (108-115)/(53-64) 108/64  SpO2:  [14 %-100 %] 96 %  211 lbs 13.79 oz    Constitutional: laying in bed, conversive, in no acute distress   HEENT: atraumatic, normocephalic, PERRL, EOMI  Respiratory: CTAB, no wheezes, rales or rhonchi   Cardiovascular: RRR, no murmur   GI: abdomen is soft, non-tender, non-distended   Genitourinary: chavez in place, urine is clear and fortino   Skin: warm and dry   Musculoskeletal: no lower extremity edema   Neurologic: follows commands approprietly, no apparent motor or sensory deficits     Data   Results for orders placed or performed during the hospital encounter of 08/25/17 (from the past 24 hour(s))   CBC with platelets differential   Result Value Ref Range    WBC 16.4 (H) 4.0 - 11.0 10e9/L    RBC Count 4.45 3.8 - 5.2 10e12/L    Hemoglobin 10.5 (L) 11.7 - 15.7 g/dL    Hematocrit 32.5 (L) 35.0 - 47.0 %    MCV 73 (L) 78 - 100 fl    MCH 23.6 (L) 26.5 - 33.0 pg    MCHC 32.3 31.5 - 36.5 g/dL    RDW 19.9 (H) 10.0 - 15.0 %    Platelet Count 140 (L) 150 - 450 10e9/L    Diff Method Automated Method     % Neutrophils 88.1 %    % Lymphocytes 3.5 %    % Monocytes 7.9 %    % Eosinophils 0.0 %    % Basophils 0.1 %    % Immature Granulocytes 0.4 %    Nucleated RBCs 0 0 /100    Absolute Neutrophil 14.4 (H) 1.6 - 8.3 10e9/L    Absolute Lymphocytes 0.6 (L) 0.8 - 5.3 10e9/L    Absolute Monocytes 1.3 0.0 - 1.3 10e9/L    Absolute Eosinophils 0.0 0.0 - 0.7 10e9/L    Absolute Basophils 0.0 0.0 - 0.2 10e9/L    Abs Immature Granulocytes 0.1 0 - 0.4 10e9/L    Absolute Nucleated RBC 0.0     Ovalocytes Slight     Macrocytes Present     Platelet Estimate Confirming automated cell count    Basic metabolic panel   Result Value Ref Range    Sodium 136 133 - 144 mmol/L    Potassium 3.1 (L) 3.4 - 5.3 mmol/L    Chloride 104 94 - 109 mmol/L    Carbon Dioxide 23 20 - 32 mmol/L    Anion Gap 9 3 - 14 mmol/L    Glucose 123 (H) 70 -  99 mg/dL    Urea Nitrogen 9 7 - 30 mg/dL    Creatinine 0.77 0.52 - 1.04 mg/dL    GFR Estimate 79 >60 mL/min/1.7m2    GFR Estimate If Black >90 >60 mL/min/1.7m2    Calcium 8.6 8.5 - 10.1 mg/dL   Lactic acid whole blood   Result Value Ref Range    Lactic Acid 1.0 0.7 - 2.0 mmol/L   Blood culture   Result Value Ref Range    Specimen Description Right Arm     Special Requests Aerobic and anaerobic bottles received     Culture Micro No growth after 4 hours    Blood culture   Result Value Ref Range    Specimen Description Blood Right Hand     Special Requests Aerobic and anaerobic bottles received     Culture Micro No growth after 4 hours    UA with Microscopic   Result Value Ref Range    Color Urine Dark Yellow     Appearance Urine Slightly Cloudy     Glucose Urine Negative NEG^Negative mg/dL    Bilirubin Urine Negative NEG^Negative    Ketones Urine 40 (A) NEG^Negative mg/dL    Specific Gravity Urine 1.009 1.003 - 1.035    Blood Urine Small (A) NEG^Negative    pH Urine 5.5 5.0 - 7.0 pH    Protein Albumin Urine 10 (A) NEG^Negative mg/dL    Urobilinogen mg/dL Normal 0.0 - 2.0 mg/dL    Nitrite Urine Negative NEG^Negative    Leukocyte Esterase Urine Trace (A) NEG^Negative    Source Midstream Urine     WBC Urine 11 (H) 0 - 2 /HPF    RBC Urine 11 (H) 0 - 2 /HPF    Bacteria Urine Few (A) NEG^Negative /HPF    Squamous Epithelial /HPF Urine 9 (H) 0 - 1 /HPF    Mucous Urine Present (A) NEG^Negative /LPF   EKG 12 lead   Result Value Ref Range    Interpretation ECG Click View Image link to view waveform and result    Urology IP Consult: right UVJ stone with mod hydro; Consultant may enter orders: Yes; Patient to be seen: ASAP - within 4 hours; Call back #: [phone]; Requested Clinic/Group: [facility]/ Dr Grady; Requested Provider: Ousmane Reyes MD     8/25/2017  6:10 AM  [facility] Inpatient Consultation Note    Ayesha Pereyra MRN# [id number]   Age: 46 year old Date of  Birth: 1970     Date of Admission:  8/25/2017    Reason for consult: Fevers, abdominal pain       Requesting physician: Dr. Cline                History of Present Illness:   46 year old Faroese SPEAKING woman that I am seeing for onset of   fevers and pain.  She has right lower quadrant pain and CT   showing 4 mm R UVJ stone on ER visit from 8/23; at that time she   was discharged without urology consultation.  ER notified me at 2   AM today about her stone.  They had provided IV Vanco and   Levaquin. Urine is nitrate negative and she was hemodynamically   stable with low grade fevers.  Dr. Cline called me again at   0445 and felt her BP was dipping a bit.  I instructed him to give   her 1 gm IV Invanz and proceeded to add her on for urgent stent   placement.  She remains hemodynamically stable at bedside now but   had rigors.     FROM ED PROVIDER NOTE: Ayesha Pereyra is a 46 year old   female who presents to the emergency department today for   abdominal pain. The patient reports right middle and lower   quadrant abdominal pain since this morning that she describes as   a sharp, poking sensation. The pain radiates to her right flank   and is similar to her previous episodes.  Specifically, the pain   comes and goes, and is accompanied by hematuria.  Severe when   present.  Not worse with exertion or with p.o. Intake.         Past Medical History:   History reviewed. No pertinent past medical history.          Past Surgical History:     Past Surgical History:   Procedure Laterality Date     GYN SURGERY       HYSTERECTOMY  1995             Social History:     Social History     Social History     Marital status:      Spouse name: N/A     Number of children: N/A     Years of education: N/A     Occupational History     Not on file.     Social History Main Topics     Smoking status: Never Smoker     Smokeless tobacco: Never Used     Alcohol use No     Drug use: No     Sexual activity: Not  on file     Other Topics Concern     Not on file     Social History Narrative             Family History:   No family history on file.          Immunizations:     There is no immunization history on file for this patient.          Allergies:     Allergies   Allergen Reactions     Keflex [Cephalexin]              Medications:     Current Facility-Administered Medications   Medication     [Auto Hold] tamsulosin (FLOMAX) capsule 0.4 mg     [Auto Hold] naloxone (NARCAN) injection 0.1-0.4 mg     0.9% sodium chloride infusion     [Auto Hold] acetaminophen (TYLENOL) Suppository 650 mg     [Auto Hold] HYDROmorphone (PF) (DILAUDID) injection 0.3-0.5 mg     [Auto Hold] ondansetron (ZOFRAN-ODT) ODT tab 4 mg    Or     [Auto Hold] ondansetron (ZOFRAN) injection 4 mg     [Auto Hold] prochlorperazine (COMPAZINE) injection 5-10 mg    Or     [Auto Hold] prochlorperazine (COMPAZINE) tablet 5-10 mg    Or     [Auto Hold] prochlorperazine (COMPAZINE) Suppository 25 mg     naloxone (NARCAN) injection 0.1-0.4 mg     norepinephrine (LEVOPHED) 16 mg in D5W 250 mL infusion     ondansetron (ZOFRAN-ODT) ODT tab 4 mg    Or     ondansetron (ZOFRAN) injection 4 mg     ertapenem (INVanz) 1 g vial to attach to  mL bag             Review of Systems:   Comprehensive review of systems from the ED provider note at   [facility] was reviewed with no changes except   per HPI.     Examination:  /52  Pulse 77  Temp 100.5  F (38.1  C) (Oral)  Resp 19    Wt 96.4 kg (212 lb 9.6 oz)  LMP 08/21/2017  SpO2 99%  BMI   34.31 kg/m2  General: in mild distress, visible rigors  HEENT: Face symmetric, mucous membranes moist and pink  Eyes: No scleral icterus  Neck: Symmetric  Chest wall: Symmetric  Respiratory: Breathing unlabored, no audible wheezing  Cardiac: Extremities warm and well perfused, no edema  Abdomen: obese, soft, non tender, non distended  Back: No CVA or flank tenderness  Extremities: No evidence of deformities or  trauma  Neuro:Grossly non focal  Pysch: Normal mood and affect  Skin: No evident rashes or lesions            Data:     Lab Results   Component Value Date    WBC 16.4 08/25/2017     Lab Results   Component Value Date    RBC 4.45 08/25/2017     Lab Results   Component Value Date    HGB 10.5 08/25/2017     Lab Results   Component Value Date    HCT 32.5 08/25/2017     Lab Results   Component Value Date     08/25/2017     Creatinine   Date Value Ref Range Status   08/25/2017 0.77 0.52 - 1.04 mg/dL Final   ]  Lab Results   Component Value Date    BUN 9 08/25/2017       Imaging:  CT ABDOMEN PELVIS W/O CONTRAST  8/23/2017 11:15 PM      INDICATION: Abdominal or flank pain, possible stone.       TECHNIQUE: Thin axial images through the abdomen and pelvis   without  contrast. Coronal reformatted images. Radiation dose for this   scan was  reduced using automated exposure control, adjustment of the mA   and/or  kV according to patient size, or iterative reconstruction   technique.     COMPARISON: 4/12/2012.     FINDINGS: Mild-moderate right hydronephrosis and perinephric   stranding  due to a 0.4 cm UVJ stone. No other urinary stones. Liver is   slightly  enlarged measuring 21 cm craniocaudad. No focal liver lesions.  Contracted gallbladder. Spleen, pancreas and adrenal glands are  negative without benefit of contrast.     Uterus is present. No suspicious pelvic masses. No bowel   obstruction  or other acute findings. Mild degenerative and hypertrophic   changes in  the visualized spine.         IMPRESSION: Mild-moderate right hydronephrosis due to a 0.4 cm   UVJ  Stone.    Impression:  4 mm right UVJ stone  urosepsis    Plan:  I spoke with patient using Kazakh speaking phone .  Discussed my concern for obstructing stone causing   pyelonephritis/urosepsis.  Advised we proceed immediately with intervention -- cysto, stent   placement.  Discussed procedure and risks; notified her that she may require    secondary procedure for stone removal down the road; would plan   for repeat CT scan prior.  All questions answered.      Ousmane Grady MD  [facility]  [phone]          XR Chest Port 1 View    Narrative    XR CHEST PORT 1 VW  8/25/2017 5:00 AM     INDICATION: Line placement.    COMPARISON: None.      Impression    IMPRESSION: Right IJ line tip in SVC. Shallow inspiration accentuates  interstitial markings and heart size. No focal air-space disease. Mild  torsion aorta.    CURRY ONEIL MD   XR Surgery ZACKARY L/T 5 Min Fluoro w Stills    Narrative    SURGERY C-ARM FLUOROSCOPY LESS THAN FIVE MINUTES WITH STILLS   8/25/2017 6:35 AM     HISTORY: Right ureteral stent placement.    COMPARISON: None.      Impression    IMPRESSION: Three spot fluoroscopic images were obtained during  placement of right ureteral stent. By completion, ureteral stent  appears to be in appropriate position overlying the expected location  of right kidney. No contrast administered. Total fluoroscopy time 0.5  minutes.    KALPANA GEORGES MD   Glucose by meter   Result Value Ref Range    Glucose 163 (H) 70 - 99 mg/dL

## 2017-08-25 NOTE — ED NOTES
BP 82/49 at 0345. Dr. Brown notified.     Dr. Cline at bedside at this time to consent patient for central line procedure. Language line used for translation.     Consent signed by patient.

## 2017-08-25 NOTE — PHARMACY-VANCOMYCIN DOSING SERVICE
Pharmacy Vancomycin Initial Note  Date of Service 2017  Patient's  1970  46 year old, female    Indication: Intra-abdominal infection    Current estimated CrCl = Estimated Creatinine Clearance: 104.5 mL/min (based on Cr of 0.77).    Creatinine for last 3 days  2017: 10:30 PM Creatinine 0.85 mg/dL  2017:  1:40 AM Creatinine 0.77 mg/dL    Recent Vancomycin Level(s) for last 3 days  No results found for requested labs within last 72 hours.      Vancomycin IV Administrations (past 72 hours)                   vancomycin (VANCOCIN) 2,000 mg in NaCl 0.9 % 500 mL intermittent infusion (mg) 2,000 mg New Bag 17 0335                Nephrotoxins and other renal medications (Future)    Start     Dose/Rate Route Frequency Ordered Stop    17 1100  vancomycin 1250 mg in 0.9% NaCl 250 mL PREMIX      1,250 mg Intravenous EVERY 8 HOURS 17 0909      17 0600  norepinephrine (LEVOPHED) 16 mg in D5W 250 mL infusion      0.03-0.4 mcg/kg/min × 96.4 kg  2.7-36.2 mL/hr  Intravenous CONTINUOUS 17 0552            Contrast Orders - past 72 hours     None                Plan:  1.  Start vancomycin  1500 mg IV q12h.   2.  Goal Trough Level: 10-15 mg/L   3.  Pharmacy will check trough levels as appropriate in 1-3 Days.    4. Serum creatinine levels will be ordered a minimum of twice weekly.    5. [place] method utilized to dose vancomycin therapy: Method 1    Monica Martinez, SimoneD

## 2017-08-25 NOTE — OR NURSING
0715HR - Carmine Mcfadden & Talib now rounding on pt; pt presently progressing appropriately. ARLIN Mayers to take over pt cares per ARLIN Mfcadden  0730HR - ARLIN Mcfadden re-rounding on pt, Pt hypotensive; Levophed gtt commenced per orders now at 0.03mcg/kg/min.  0740hr - Levophed gtt now at 0.1mcg/kg/min - BP now WNL. ARLIN Richey rounding on pt.  0745hr - ARLIN Mayers now rounding on pt - pt remains stable. Pt fever now improving post ID APAP given. Okay for pt to transfer to ICU per ARLIN Mayers. ARLIN Mayers confirmed will liaise w/Intensivist.

## 2017-08-25 NOTE — CONSULTS
[facility] Inpatient Consultation Note    Ayesha Pereyra MRN# [id number]   Age: 46 year old YOB: 1970     Date of Admission:  8/25/2017    Reason for consult: Fevers, abdominal pain       Requesting physician: Dr. Cline                History of Present Illness:   46 year old Tuval[place] SPEAKING woman that I am seeing for onset of fevers and pain.  She has right lower quadrant pain and CT showing 4 mm R UVJ stone on ER visit from 8/23; at that time she was discharged without urology consultation.  ER notified me at 2 AM today about her stone.  They had provided IV Vanco and Levaquin. Urine is nitrate negative and she was hemodynamically stable with low grade fevers.  Dr. Cline called me again at 0445 and felt her BP was dipping a bit.  I instructed him to give her 1 gm IV Invanz and proceeded to add her on for urgent stent placement.  She remains hemodynamically stable at bedside now but had rigors.     FROM ED PROVIDER NOTE: Ayesha Pereyra is a 46 year old female who presents to the emergency department today for abdominal pain. The patient reports right middle and lower quadrant abdominal pain since this morning that she describes as a sharp, poking sensation. The pain radiates to her right flank and is similar to her previous episodes.  Specifically, the pain comes and goes, and is accompanied by hematuria.  Severe when present.  Not worse with exertion or with p.o. Intake.         Past Medical History:   History reviewed. No pertinent past medical history.          Past Surgical History:     Past Surgical History:   Procedure Laterality Date     GYN SURGERY       HYSTERECTOMY  1995             Social History:     Social History     Social History     Marital status:      Spouse name: N/A     Number of children: N/A     Years of education: N/A     Occupational History     Not on file.     Social History Main Topics     Smoking status: Never Smoker      Smokeless tobacco: Never Used     Alcohol use No     Drug use: No     Sexual activity: Not on file     Other Topics Concern     Not on file     Social History Narrative             Family History:   No family history on file.          Immunizations:     There is no immunization history on file for this patient.          Allergies:     Allergies   Allergen Reactions     Keflex [Cephalexin]              Medications:     Current Facility-Administered Medications   Medication     [Auto Hold] tamsulosin (FLOMAX) capsule 0.4 mg     [Auto Hold] naloxone (NARCAN) injection 0.1-0.4 mg     0.9% sodium chloride infusion     [Auto Hold] acetaminophen (TYLENOL) Suppository 650 mg     [Auto Hold] HYDROmorphone (PF) (DILAUDID) injection 0.3-0.5 mg     [Auto Hold] ondansetron (ZOFRAN-ODT) ODT tab 4 mg    Or     [Auto Hold] ondansetron (ZOFRAN) injection 4 mg     [Auto Hold] prochlorperazine (COMPAZINE) injection 5-10 mg    Or     [Auto Hold] prochlorperazine (COMPAZINE) tablet 5-10 mg    Or     [Auto Hold] prochlorperazine (COMPAZINE) Suppository 25 mg     naloxone (NARCAN) injection 0.1-0.4 mg     norepinephrine (LEVOPHED) 16 mg in D5W 250 mL infusion     ondansetron (ZOFRAN-ODT) ODT tab 4 mg    Or     ondansetron (ZOFRAN) injection 4 mg     ertapenem (INVanz) 1 g vial to attach to  mL bag             Review of Systems:   Comprehensive review of systems from the ED provider note at [facility] was reviewed with no changes except per HPI.     Examination:  /52  Pulse 77  Temp 100.5  F (38.1  C) (Oral)  Resp 19  Wt 96.4 kg (212 lb 9.6 oz)  LMP 08/21/2017  SpO2 99%  BMI 34.31 kg/m2  General: in mild distress, visible rigors  HEENT: Face symmetric, mucous membranes moist and pink  Eyes: No scleral icterus  Neck: Symmetric  Chest wall: Symmetric  Respiratory: Breathing unlabored, no audible wheezing  Cardiac: Extremities warm and well perfused, no edema  Abdomen: obese, soft, non tender, non  distended  Back: No CVA or flank tenderness  Extremities: No evidence of deformities or trauma  Neuro:Grossly non focal  Pysch: Normal mood and affect  Skin: No evident rashes or lesions            Data:     Lab Results   Component Value Date    WBC 16.4 08/25/2017     Lab Results   Component Value Date    RBC 4.45 08/25/2017     Lab Results   Component Value Date    HGB 10.5 08/25/2017     Lab Results   Component Value Date    HCT 32.5 08/25/2017     Lab Results   Component Value Date     08/25/2017     Creatinine   Date Value Ref Range Status   08/25/2017 0.77 0.52 - 1.04 mg/dL Final   ]  Lab Results   Component Value Date    BUN 9 08/25/2017       Imaging:  CT ABDOMEN PELVIS W/O CONTRAST  8/23/2017 11:15 PM      INDICATION: Abdominal or flank pain, possible stone.       TECHNIQUE: Thin axial images through the abdomen and pelvis without  contrast. Coronal reformatted images. Radiation dose for this scan was  reduced using automated exposure control, adjustment of the mA and/or  kV according to patient size, or iterative reconstruction technique.     COMPARISON: 4/12/2012.     FINDINGS: Mild-moderate right hydronephrosis and perinephric stranding  due to a 0.4 cm UVJ stone. No other urinary stones. Liver is slightly  enlarged measuring 21 cm craniocaudad. No focal liver lesions.  Contracted gallbladder. Spleen, pancreas and adrenal glands are  negative without benefit of contrast.     Uterus is present. No suspicious pelvic masses. No bowel obstruction  or other acute findings. Mild degenerative and hypertrophic changes in  the visualized spine.         IMPRESSION: Mild-moderate right hydronephrosis due to a 0.4 cm UVJ  Stone.    Impression:  4 mm right UVJ stone  urosepsis    Plan:  I spoke with patient using Danish speaking phone .  Discussed my concern for obstructing stone causing pyelonephritis/urosepsis.  Advised we proceed immediately with intervention -- cysto, stent  placement.  Discussed procedure and risks; notified her that she may require secondary procedure for stone removal down the road; would plan for repeat CT scan prior.  All questions answered.      Ousmane Grady MD  [facility]  [phone]

## 2017-08-25 NOTE — PROGRESS NOTES
Hospitalist progress note brief update seen and examined by me in the ICU with , family, and [facility] at the bedside. Feeling tired and weak but denies pain. Blood pressure seems to have stabilized. [place] in place.    Overall Ms. Pereyra is a markedly pleasant 46 year old Mohawk speaking woman with prior nephrolithiasis who was admitted 8/24/2017 for severe sepsis due to obstructive right ureterolithiasis causing UTI. On presentation she was ill appearing and hypotensive and required pressor infusion. Labs were notable for leukocytosis and CT showed a 0.4 mm right ureteral stone causing hydronephrosis. She underwent emergent cystoscopy with right ureteral stent placement today. She remains on Invanz and Vancomycin empirically as we await culture data. We plan to wean off her pressor and transfer her to the surgical floor this afternoon.    Naresh Hilton

## 2017-08-25 NOTE — IP AVS SNAPSHOT
[address]    [place] [address]    Phone:  [phone]                                       After Visit Summary   8/25/2017    Ayesha Pereyra    MRN: [id number]           After Visit Summary Signature Page     I have received my discharge instructions, and my questions have been answered. I have discussed any challenges I see with this plan with the nurse or doctor.    ..........................................................................................................................................  Patient/Patient Representative Signature      ..........................................................................................................................................  Patient Representative Print Name and Relationship to Patient    ..................................................               ................................................  Date                                            Time    ..........................................................................................................................................  Reviewed by Signature/Title    ...................................................              ..............................................  Date                                                            Time

## 2017-08-25 NOTE — ED NOTES
[facility]  ED Nurse Handoff Report    ED Chief complaint: Flank Pain (states seen here previously and diagnosed with kidney stone. feels pain is worse)      ED Diagnosis:   Final diagnoses:   4 mm Infected UVJ kidney stone on right side   Sepsis, due to unspecified organism (H)       Code Status: Full Code    Allergies:   Allergies   Allergen Reactions     Keflex [Cephalexin]        Activity level - Baseline/Home:  Independent    Activity Level - Current:   Independent     Needed?: Yes    Isolation: No  Infection: Not Applicable    Bariatric?: No    Vital Signs:   Vitals:    08/25/17 0145 08/25/17 0205 08/25/17 0208 08/25/17 0218   BP:       Pulse:       Resp:  22     Temp:       TempSrc:       SpO2: 94% 90% 95%    Weight:    96.4 kg (212 lb 9.6 oz)       Cardiac Rhythm: ,        Pain level: 0-10 Pain Scale: 0    Is this patient confused?: No    Patient Report: Initial Complaint: Kidney stones diagnosed yesterday. Patient came in today for increased pain, n/v, unable to keep down food or liquids and fevers.   Focused Assessment: Fever 100.8 oral on admission, patient is hot to the touch and diaphoretic. Patient rates pain 5/10 in right lower quadrant. No n/v since arrival, relief with zofran. IV ABX infusing and IV fluids boluses given. IV sites x2.   Tests Performed: Labs, blood cultures  Abnormal Results: see results tab  Treatments provided: IV abx, fluid boluses, pain meds, zofran, blood cultures, 02 PRN    Family Comments: Family at bedside    OBS brochure/video discussed/provided to patient: N/A    ED Medications:   Medications   sodium chloride (PF) 0.9% PF flush 3 mL (not administered)   sodium chloride (PF) 0.9% PF flush 3 mL (not administered)   ondansetron (ZOFRAN) injection 4 mg (4 mg Intravenous Given 8/25/17 0155)   HYDROmorphone (DILAUDID) injection 1 mg (1 mg Intravenous Given 8/25/17 0155)   0.9% sodium chloride infusion ( Intravenous New Bag 8/25/17 9240)   levofloxacin  (LEVAQUIN) infusion 750 mg (750 mg Intravenous New Bag 8/25/17 0205)   vancomycin (VANCOCIN) 2,000 mg in NaCl 0.9 % 500 mL intermittent infusion (not administered)   0.9% sodium chloride BOLUS (0 mLs Intravenous Stopped 8/25/17 0242)       Drips infusing?:  Yes      ED NURSE PHONE NUMBER: [phone]

## 2017-08-25 NOTE — ED PROVIDER NOTES
History     Chief Complaint:    Flank Pain     HPI   The patient requested the use of a Australian interpretor.   Ayesha Pereyra is a 46 year old female who presents to the ED today with flank pain. The patient was seen here last night and was diagnosed with a 4 mm right UVJ kidney stone. She presents back to the ED today because her flank pain has worsened, and she states that she has a fever and a headache, and she has been vomiting as well. She also states that she has not been able to eat anything and reports severe diaphoresis as well. The patient reports that she cannot even stand up because the pain is so intense. She denies taking any other medications other than the ones that she was prescribed yesterday. She has no further complaints.    Allergies:  Keflex      Medications:    Norco  Flomax  Macrobid  Ambien    Past Medical History:    Kidney stones    Past Surgical History:    Hysterectomy     Family History:    History reviewed. No pertinent family history.     Social History:  Marital Status:   Presents to the ED with friend  Tobacco Use: never smoker  Alcohol Use: no    Review of Systems   Constitutional: Positive for appetite change, diaphoresis and fever.   Gastrointestinal: Positive for vomiting.   Genitourinary: Positive for flank pain.   Neurological: Positive for headaches.   All other systems reviewed and are negative.      Physical Exam   First Vitals:   BP: 108/60 mmHg  Heart Rate: 77   Resp: 22  SpO2: 95% RA  Temp: 100.8  F (oral)       Physical Exam  Nursing note and vitals reviewed.  Constitutional:  Oriented to person, place, and time. Cooperative. Appears uncomfortable and diaphoretic.  HENT:   Nose:    Nose normal.   Mouth/Throat:   Mucous membranes are normal.   Eyes:    Conjunctivae normal and EOM are normal.      Pupils are equal, round, and reactive to light.   Neck:    Trachea normal.   Cardiovascular:  Normal rate, regular rhythm, normal heart sounds and normal  pulses. No murmur heard.  Pulmonary/Chest:  Effort normal and breath sounds normal.   Abdominal:   Soft. Normal appearance and bowel sounds are normal.      Right CVA tenderness to palpation.   Musculoskeletal:  Extremities atraumatic x 4.   Lymphadenopathy:  No cervical adenopathy.   Neurological:   Alert and oriented to person, place, and time. Normal strength.      No cranial nerve deficit or sensory deficit. GCS eye subscore is 4. GCS verbal subscore is 5. GCS motor subscore is 6.   Skin:    Skin is intact. No rash noted.   Psychiatric:   Normal mood and affect.       Emergency Department Course   EKG:  @ 0413  Indication: ICU admit  Vent. Rate 71 bpm. CA interval 154 ms. QRS duration 84 ms. QT/QTc 404/439 ms. P-R-T axis 59 7 37.   Sinus rhythm with occasional premature ventricular complexes. Cannot rule out inferior infarct, age undetermined.   Read @ 0416 by Dr. Cline.     Laboratory:  Blood culture x2: pending  CBC:  WBC 16.4 (H), HGB 10.5 (L),  (L).  BMP: potassium 3.1 (L), glucose 123 (H), otherwise WNL (Creatinine 0.77)   Lactic acid: 1.0    Interventions:  (0149) Normal Saline, 1 liter, IV bolus   (0155) Zofran 4 mg, IV  (0155) Dilaudid 1 mg, IV  (0205) Levaquin 750 mg, IV    Procedures:       Central Line Placement       PROCEDURE:  Central Line Placement with Ultrasound Guidance.    INDICATIONS: Vascular access and Sepsis (monitoring of mixed venous oxygen saturations)    CONSENT: Risks (including but not limited to: pneumothorax,  bleeding, infection or artery puncture), benefits and alternatives were discussed with  patient and consent for procedure was obtained.    TIMEOUT: Universal protocol was followed. TIME OUT conducted just prior to starting procedure confirmed patient identity, site/side, procedure, patient position, and availability of correct equipment, and implants.?  Yes      MEDICATION: Lidocaine    PROCEDURAL NOTE: Right Internal Jugular approach was selected and the right  anterior neck was prepped, cleansed and draped in a sterile fashion.  Mask, gown and gloves were used per sterile protocol.  Patient was then placed into Trendelenburg position and lidocaine was used for local anesthesia.  Vascular probe with ultrasound was used in a sterile fashion for guidence.  Introducer needle was then used to gain access to the central venous circulation.  Using Seldinger technique the  Triple lumen catheter was placed.  Catheter port(s) were aspirated and flushed.  Central line was sutured in place and sterile dressing applied.  Appropriate placement was confirmed by x-ray and no pneumothorax was visualized.    PATIENT STATUS: Patient tolerated the procedure well. There were  no complications.          Emergency Department Course:  Nursing notes and vitals reviewed.  (0106) I performed an exam of the patient as documented above.    A peripheral IV was established. Blood was drawn from the patient. This was sent for laboratory testing, findings above.   (0150) I consulted with Dr. Quintanilla of urology regarding the patient.  Findings and plan explained to the patient who consents to admission.   (0200) I discussed the patient with Dr. Barnhart of the hospitalist service, who will admit the patient to an ICU bed for further monitoring, evaluation, and treatment.   (0420) I placed a central line, as noted in the procedure note above.   (0440) I consulted with Dr. Quintanilla of urology regarding the patient.      Impression & Plan    CMS Diagnoses:   The patient has signs of Septic Shock as evidenced by:    1. Presence of Sepsis, AND  2. Persistent hypotension    Time sepsis diagnosis confirmed = 0400 as this was the time whenPersistent Hypotension present (2 consecutive SBP <90 or MAP <65 within ONE hour after 30cc/kg IVF bolus completed)      3 Hour Septic Shock Bundle Completion:  1. Initial Lactic Acid Result:   Recent Labs   Lab Test  08/25/17   0140   LACT  1.0     2. Blood Cultures before  Antibiotics: Yes  3. Broad Spectrum Antibiotics Administered: Yes     Anti-infectives (Future)    Start     Dose/Rate Route Frequency Ordered Stop    08/25/17 0600  ertapenem (INVanz) 1 g vial to attach to  mL bag      1 g  over 30 Minutes Intravenous EVERY 24 HOURS 08/25/17 0536              Medical Decision Making:Ayesha Pereyra is a 46 year old female who was seen a day ago and diagnosed with a 3 mm UVJ kidney stone. She came back tonight with a fever and worsening pain, nausea, and vomiting. She also feels somewhat dizzy. She clearly was uncomfortable upon arrival here, and she appeared moderately ill. She had a fever here as well, which was concerning to me that she might have an infected kidney stone and possibly be septic. I proceeded with the above workup and spoke with the on call urologist, Dr. Quintanilla, immediately after seeing the patient. He indicated that he would likely bring her to the operating room first thing in the morning for stent placement and more definitive management and sooner if that becomes necessary. She was provided IV fluids as well as IV Levaquin and Vancomycin. I then spoke with Dr. Barnhart[facility] Thankfully, her lactic acid is normal. However, her white blood cell count has gone up since yesterday, and I am still concerned she is septic, and therefore she is being provided the septis bundle of IV fluids.    Addendum: The patient unfortunately had a continued drop in her blood pressure to 82 systolic. This was despite IV fluids and IV antibiotics. Therefore, I felt the need to place a central line and start vasopressors. I subsequently placed the central line per the above procedure note. We then obtained a portable chest xray to confirm placement. I spoke with Dr. Quintanilla again to update him on the patient's condition. We are trying to get her to the operating room sooner than the originally planned time, due to her worsening  condition.      Critical care time: 35 minutes.     Diagnosis:    ICD-10-CM    1. 4 mm Infected UVJ kidney stone on right side N20.0 CBC with platelets differential     Basic metabolic panel     Lactic acid whole blood     Blood culture     Blood culture   2. Sepsis, due to unspecified organism (H) A41.9    3.      Septic Shock    Disposition:  Admitted to hospitalist    IOdilia, am serving as a scribe on 8/25/2017 at 1:06 AM to personally document services performed by Dr. Cline based on my observations and the provider's statements to me.   8/25/2017    EMERGENCY DEPARTMENT       Benitez Cline MD  08/25/17 0645       Benitez Cline MD  08/25/17 0645

## 2017-08-25 NOTE — PHARMACY-ADMISSION MEDICATION HISTORY
Admission medication history interview status for the 8/25/2017  admission is complete. See EPIC admission navigator for prior to admission medications     Medication history source reliability:Good    Actions taken by pharmacist (provider contacted, etc): spoke with, pt, , and called [facility] pharmacy [phone] to confirm meds     Additional medication history information not noted on PTA med list : pt filled bupropion 150 mg XL daily (90 day supply) 7/19/17 but confirmed with pt she is NOT taking it.    Medication reconciliation/reorder completed by provider prior to medication history? No    Time spent in this activity: 25 minutes    Prior to Admission medications    Medication Sig Last Dose Taking? Auth Provider   ferrous sulfate (IRON) 325 (65 FE) MG tablet Take 325 mg by mouth daily (with breakfast) 8/23/2017 Yes Unknown, Entered By History   oxybutynin (DITROPAN-XL) 10 MG 24 hr tablet Take 10 mg by mouth daily  8/23/2017 Yes Unknown, Entered By History   carboxymethylcellulose (REFRESH PLUS) 0.5 % SOLN ophthalmic solution as needed for dry eyes prn med Yes Unknown, Entered By History   OMEPRAZOLE PO Take 20 mg by mouth every morning 8/24/2017 at Unknown time Yes Unknown, Entered By History   HYDROcodone-acetaminophen (NORCO) 5-325 MG per tablet Take 1-2 tablets by mouth every 6 hours as needed for moderate to severe pain 8/24/2017 at Unknown time Yes Zeyad Reardon MD   tamsulosin (FLOMAX) 0.4 MG capsule Take 1 capsule (0.4 mg) by mouth daily for 7 doses 8/24/2017 at Unknown time Yes Zeyad Reardon MD   ibuprofen (ADVIL/MOTRIN) 200 MG tablet Take 2 tablets (400 mg) by mouth every 8 hours as needed for pain 8/24/2017 at Unknown time Yes Zeyad Reardon MD   nitroFURantoin, macrocrystal-monohydrate, (MACROBID) 100 MG capsule Take 1 capsule (100 mg) by mouth 2 times daily 8/24/2017 at pm Yes Zeyad Reardon  MD Hal[place]   acetaminophen (TYLENOL) 500 MG tablet Take 1-2 tablets by mouth every 6 hours as needed for pain. prn med Yes Ousmane Dos Santos MD Beth Mulder, SimoneD

## 2017-08-26 LAB
ANION GAP SERPL CALCULATED.3IONS-SCNC: 8 MMOL/L (ref 3–14)
BACTERIA SPEC CULT: NORMAL
BUN SERPL-MCNC: 7 MG/DL (ref 7–30)
CALCIUM SERPL-MCNC: 7.9 MG/DL (ref 8.5–10.1)
CHLORIDE SERPL-SCNC: 110 MMOL/L (ref 94–109)
CO2 SERPL-SCNC: 22 MMOL/L (ref 20–32)
CREAT SERPL-MCNC: 0.71 MG/DL (ref 0.52–1.04)
DIFFERENTIAL METHOD BLD: NORMAL
ERYTHROCYTE [DISTWIDTH] IN BLOOD BY AUTOMATED COUNT: NORMAL % (ref 10–15)
GFR SERPL CREATININE-BSD FRML MDRD: 87 ML/MIN/1.7M2
GLUCOSE BLDC GLUCOMTR-MCNC: 110 MG/DL (ref 70–99)
GLUCOSE BLDC GLUCOMTR-MCNC: 122 MG/DL (ref 70–99)
GLUCOSE BLDC GLUCOMTR-MCNC: 125 MG/DL (ref 70–99)
GLUCOSE BLDC GLUCOMTR-MCNC: 126 MG/DL (ref 70–99)
GLUCOSE BLDC GLUCOMTR-MCNC: 131 MG/DL (ref 70–99)
GLUCOSE BLDC GLUCOMTR-MCNC: 134 MG/DL (ref 70–99)
GLUCOSE SERPL-MCNC: 114 MG/DL (ref 70–99)
HCT VFR BLD AUTO: NORMAL % (ref 35–47)
HGB BLD-MCNC: NORMAL G/DL (ref 11.7–15.7)
LACTATE BLD-SCNC: 0.8 MMOL/L (ref 0.7–2)
Lab: NORMAL
MCH RBC QN AUTO: NORMAL PG (ref 26.5–33)
MCHC RBC AUTO-ENTMCNC: NORMAL G/DL (ref 31.5–36.5)
MCV RBC AUTO: NORMAL FL (ref 78–100)
PLATELET # BLD AUTO: NORMAL 10E9/L (ref 150–450)
POTASSIUM SERPL-SCNC: 3.7 MMOL/L (ref 3.4–5.3)
RBC # BLD AUTO: NORMAL 10E12/L (ref 3.8–5.2)
SODIUM SERPL-SCNC: 140 MMOL/L (ref 133–144)
SPECIMEN SOURCE: NORMAL
WBC # BLD AUTO: NORMAL 10E9/L (ref 4–11)

## 2017-08-26 PROCEDURE — 83605 ASSAY OF LACTIC ACID: CPT | Performed by: HOSPITALIST

## 2017-08-26 PROCEDURE — 25000128 H RX IP 250 OP 636: Performed by: INTERNAL MEDICINE

## 2017-08-26 PROCEDURE — 25000132 ZZH RX MED GY IP 250 OP 250 PS 637: Performed by: INTERNAL MEDICINE

## 2017-08-26 PROCEDURE — 80048 BASIC METABOLIC PNL TOTAL CA: CPT | Performed by: HOSPITALIST

## 2017-08-26 PROCEDURE — 12000000 ZZH R&B MED SURG/OB

## 2017-08-26 PROCEDURE — 99211 OFF/OP EST MAY X REQ PHY/QHP: CPT

## 2017-08-26 PROCEDURE — 99233 SBSQ HOSP IP/OBS HIGH 50: CPT | Performed by: HOSPITALIST

## 2017-08-26 PROCEDURE — 00000146 ZZHCL STATISTIC GLUCOSE BY METER IP

## 2017-08-26 PROCEDURE — 25000132 ZZH RX MED GY IP 250 OP 250 PS 637: Performed by: PHYSICIAN ASSISTANT

## 2017-08-26 PROCEDURE — 25000132 ZZH RX MED GY IP 250 OP 250 PS 637: Performed by: HOSPITALIST

## 2017-08-26 RX ORDER — CIPROFLOXACIN 500 MG/1
500 TABLET, FILM COATED ORAL EVERY 12 HOURS SCHEDULED
Status: DISCONTINUED | OUTPATIENT
Start: 2017-08-27 | End: 2017-08-29 | Stop reason: HOSPADM

## 2017-08-26 RX ORDER — DIPHENHYDRAMINE HCL 25 MG
25-50 CAPSULE ORAL EVERY 4 HOURS PRN
Status: DISCONTINUED | OUTPATIENT
Start: 2017-08-26 | End: 2017-08-29 | Stop reason: HOSPADM

## 2017-08-26 RX ADMIN — HYDROMORPHONE HYDROCHLORIDE 0.5 MG: 1 INJECTION, SOLUTION INTRAMUSCULAR; INTRAVENOUS; SUBCUTANEOUS at 06:32

## 2017-08-26 RX ADMIN — Medication 3 MG: at 23:41

## 2017-08-26 RX ADMIN — HYDROMORPHONE HYDROCHLORIDE 0.3 MG: 1 INJECTION, SOLUTION INTRAMUSCULAR; INTRAVENOUS; SUBCUTANEOUS at 10:13

## 2017-08-26 RX ADMIN — POTASSIUM CHLORIDE 20 MEQ: 1500 TABLET, EXTENDED RELEASE ORAL at 16:41

## 2017-08-26 RX ADMIN — ACETAMINOPHEN 650 MG: 325 TABLET, FILM COATED ORAL at 15:30

## 2017-08-26 RX ADMIN — ACETAMINOPHEN 650 MG: 325 TABLET, FILM COATED ORAL at 06:13

## 2017-08-26 RX ADMIN — Medication 1 LOZENGE: at 06:32

## 2017-08-26 RX ADMIN — TAMSULOSIN HYDROCHLORIDE 0.4 MG: 0.4 CAPSULE ORAL at 10:14

## 2017-08-26 RX ADMIN — ACETAMINOPHEN 650 MG: 325 TABLET, FILM COATED ORAL at 10:13

## 2017-08-26 RX ADMIN — OMEPRAZOLE 20 MG: 20 CAPSULE, DELAYED RELEASE ORAL at 08:47

## 2017-08-26 RX ADMIN — VANCOMYCIN HYDROCHLORIDE 1500 MG: 5 INJECTION, POWDER, LYOPHILIZED, FOR SOLUTION INTRAVENOUS at 01:37

## 2017-08-26 RX ADMIN — DIPHENHYDRAMINE HYDROCHLORIDE 25 MG: 25 CAPSULE ORAL at 13:47

## 2017-08-26 RX ADMIN — ACETAMINOPHEN 650 MG: 325 TABLET, FILM COATED ORAL at 20:37

## 2017-08-26 RX ADMIN — SODIUM CHLORIDE: 9 INJECTION, SOLUTION INTRAVENOUS at 01:35

## 2017-08-26 RX ADMIN — ERTAPENEM SODIUM 1 G: 1 INJECTION, POWDER, LYOPHILIZED, FOR SOLUTION INTRAMUSCULAR; INTRAVENOUS at 04:36

## 2017-08-26 RX ADMIN — HYDROMORPHONE HYDROCHLORIDE 0.4 MG: 1 INJECTION, SOLUTION INTRAMUSCULAR; INTRAVENOUS; SUBCUTANEOUS at 21:30

## 2017-08-26 RX ADMIN — ACETAMINOPHEN 650 MG: 325 TABLET, FILM COATED ORAL at 01:43

## 2017-08-26 RX ADMIN — Medication 1 LOZENGE: at 19:28

## 2017-08-26 NOTE — PLAN OF CARE
Problem: Goal Outcome Summary  Goal: Goal Outcome Summary  Outcome: Improving  A/O x 4, c/o pain to R abdomen and sore throat.  Prn lozenge with relief.  Tylenol given x 1, dilaudid 0.3 mg given x 1 with relief.  Biswas drng dark yellow urine.  Bedrest tonight.  Has R IJ 3 lumen with NS at 100/hr.  PIV x 2 to R arm and hand SL.  K recheck 3.9, replaced with 20 MeQ orally, will check BMP in AM.  DC plan pending at this time.  Will continue to monitor.

## 2017-08-26 NOTE — PROVIDER NOTIFICATION
MD Notification    Notified Person:  MD    Notified Persons Name: Dr. Barnhart    Notification Date/Time: 8/26/17, 4:50am    Notification Interaction:  Talked with Physician    Purpose of Notification: Temperature of 102.5 oral.     Orders Received: no new orders received.     Comments:

## 2017-08-26 NOTE — PROGRESS NOTES
[facility]    Hospitalist Progress Note    Date of Service (when I saw the patient): 08/26/2017    Assessment & Plan   Ms. Pereyra is a markedly pleasant 46 year old Icelandic speaking woman with prior nephrolithiasis who was admitted 8/24/2017 for severe sepsis due to obstructive right ureterolithiasis causing pyelonephritis.     1) Severe sepsis due to obstructive right ureterolithiasis causing pyelonephritis: On presentation she was ill appearing and hypotensive and required pressor infusion. Labs were notable for leukocytosis and CT showed a 0.4 mm right ureteral stone causing hydronephrosis and perinephric fat stranding. Urology was consulted and she underwent emergent cystoscopy with right ureteral stent placement on the morning of admission. Blood and urine cultures were sent and she was started on Vancomycin and Invanz. Her pressor was weaned off.    -- She had a recurrent fever overnight but no further fevers today; blood pressure seems to have stabilized.    -- Urine cultures show normal hermann and blood cultures show no growth to date    -- Will plan to transition to Cipro tomorrow    -- Continue one more day IVF; ADAT    -- Tylenol, narcotics as needed    -- She continues Flomax    -- Remove Biswas tomorrow if continuing to improve    -- Urology follow up in 2 weeks for further management    2) Drug rash: This has developed this morning as a diffuse, pruritic, maculopapular eruption. She already received her daily dose of Invanz earlier this morning    -- Stop Vancomycin    -- Transition Invanz to Cipro starting tomorrow    -- Benadryl as needed for itching    -- Monitor rash    3) GERD: PPI    DVT Prophylaxis: Pneumatic Compression Devices  Code Status: Full Code    Disposition: Expected discharge in 2-3 days    Naresh Hilton MD    Interval History   Now has a headache and diffuse, pruritic rash. Denies pain or dyspnea. Seen and examined with a professional  at the  bedside.    -Data reviewed today: I reviewed all new labs and imaging results over the last 24 hours. I personally reviewed no images or EKG's today.    Physical Exam   Temp: 98.6  F (37  C) Temp src: Oral BP: 112/58 Pulse: 72 Heart Rate: 64 Resp: 16 SpO2: 94 % O2 Device: None (Room air)    Vitals:    08/25/17 0218 08/25/17 0900 08/26/17 0500   Weight: 96.4 kg (212 lb 9.6 oz) 96.1 kg (211 lb 13.8 oz) 99.6 kg (219 lb 9.3 oz)     Vital Signs with Ranges  Temp:  [98.6  F (37  C)-102.5  F (39.2  C)] 98.6  F (37  C)  Pulse:  [72] 72  Heart Rate:  [64-81] 64  Resp:  [16] 16  BP: (103-119)/(49-58) 112/58  SpO2:  [92 %-97 %] 94 %  I/O last 3 completed shifts:  In: 807 [P.O.:240; I.V.:567]  Out: 2450 [Urine:2450]    Constitutional: Alert and oriented to person, place and time; no apparent distress  HEENT: normocephalic moist mucus membranes  Respiratory: lungs clear to auscultation bilaterally  Cardiovascular: regular S1 S2 no murmurs rubs or gallops  GI: abdomen soft non tender non distended bowel sounds positive  Skin: diffuse maculopapular rash; good turgor  Musculoskeletal: no clubbing, cyanosis or edema  Neuro: EOMI; moves all four extremities  Psych: Appropriate affect, insight and judgment      Medications     NaCl 100 mL/hr at 08/26/17 0135       [START ON 8/27/2017] ciprofloxacin  500 mg Oral Q12H UNIQUE     tamsulosin  0.4 mg Oral Daily     omeprazole (priLOSEC) CR capsule 20 mg  20 mg Oral QAM       Data     Recent Labs  Lab 08/26/17  0613 08/25/17  1825 08/25/17  0140 08/23/17  2230   WBC Canceled, Test credited  --  16.4* 12.5*   HGB Canceled, Test credited  --  10.5* 11.8   MCV Canceled, Test credited  --  73* 74*   PLT Canceled, Test credited  --  140* 194     --  136 140   POTASSIUM 3.7 3.9 3.1* 3.8   CHLORIDE 110*  --  104 106   CO2 22  --  23 26   BUN 7  --  9 10   CR 0.71  --  0.77 0.85   ANIONGAP 8  --  9 8   AMOL 7.9*  --  8.6 8.8   *  --  123* 99       No results found for this or any previous  visit (from the past 24 hour(s)).

## 2017-08-26 NOTE — PLAN OF CARE
Problem: Goal Outcome Summary  Goal: Goal Outcome Summary  POD1. Pt is a/o x4. Tmax 100.4, other VSS. Tylenol and dilaudid for HA and abd pain, given with relief. C/o of itching from reaction to IV abx, gave benadryl with relief. IJ removed this shift, dressing CDI. Ambulating in room SBA. BG checks WDL. Continue to monitor.

## 2017-08-26 NOTE — PLAN OF CARE
Problem: Goal Outcome Summary  Goal: Goal Outcome Summary  Outcome: No Change  A/Ox4. Tmax temp overnight was 102.5 oral. Gave prn tylenol x2 and cold compresses. Notified on-call hospitalist for increase temperature, no new orders were given. Other VSS on RA. C/o of abdominal pain, gave prn dilaudid x1, effective. Throat lozenge given x1 for sore throat. LS clear. BS active, pt report passing flatus. On clear diet, advance as tolerated. Biswas patent with adequate clear fortino yellow urine. Has R IJ with NS infusing 100/hr. On IV abx. PIV on R-hand and R-upper arm, both SL. K replaced yesterday on evening. Recheck this AM. D/c pending. Continue to monitor.

## 2017-08-26 NOTE — PROGRESS NOTES
UROLOGY  No issues on our end.     PLAN  2 weeks of antibiotics, then will plan to schedule definitive stone removal procedure in 2-4 weeks once she's fully recovered

## 2017-08-27 LAB
ANION GAP SERPL CALCULATED.3IONS-SCNC: 8 MMOL/L (ref 3–14)
BASOPHILS # BLD AUTO: 0 10E9/L (ref 0–0.2)
BASOPHILS NFR BLD AUTO: 0.2 %
BUN SERPL-MCNC: 5 MG/DL (ref 7–30)
CALCIUM SERPL-MCNC: 8.1 MG/DL (ref 8.5–10.1)
CHLORIDE SERPL-SCNC: 109 MMOL/L (ref 94–109)
CO2 SERPL-SCNC: 22 MMOL/L (ref 20–32)
CREAT SERPL-MCNC: 0.61 MG/DL (ref 0.52–1.04)
DIFFERENTIAL METHOD BLD: ABNORMAL
EOSINOPHIL # BLD AUTO: 0.1 10E9/L (ref 0–0.7)
EOSINOPHIL NFR BLD AUTO: 0.6 %
ERYTHROCYTE [DISTWIDTH] IN BLOOD BY AUTOMATED COUNT: 20 % (ref 10–15)
GFR SERPL CREATININE-BSD FRML MDRD: >90 ML/MIN/1.7M2
GLUCOSE SERPL-MCNC: 107 MG/DL (ref 70–99)
HCT VFR BLD AUTO: 28.8 % (ref 35–47)
HGB BLD-MCNC: 9.2 G/DL (ref 11.7–15.7)
IMM GRANULOCYTES # BLD: 0 10E9/L (ref 0–0.4)
IMM GRANULOCYTES NFR BLD: 0.2 %
LYMPHOCYTES # BLD AUTO: 1.6 10E9/L (ref 0.8–5.3)
LYMPHOCYTES NFR BLD AUTO: 15 %
MCH RBC QN AUTO: 23.1 PG (ref 26.5–33)
MCHC RBC AUTO-ENTMCNC: 31.9 G/DL (ref 31.5–36.5)
MCV RBC AUTO: 72 FL (ref 78–100)
MONOCYTES # BLD AUTO: 1.1 10E9/L (ref 0–1.3)
MONOCYTES NFR BLD AUTO: 10.3 %
NEUTROPHILS # BLD AUTO: 7.7 10E9/L (ref 1.6–8.3)
NEUTROPHILS NFR BLD AUTO: 73.7 %
NRBC # BLD AUTO: 0 10*3/UL
NRBC BLD AUTO-RTO: 0 /100
PLATELET # BLD AUTO: 146 10E9/L (ref 150–450)
POTASSIUM SERPL-SCNC: 3.5 MMOL/L (ref 3.4–5.3)
RBC # BLD AUTO: 3.99 10E12/L (ref 3.8–5.2)
SODIUM SERPL-SCNC: 139 MMOL/L (ref 133–144)
WBC # BLD AUTO: 10.4 10E9/L (ref 4–11)

## 2017-08-27 PROCEDURE — 36415 COLL VENOUS BLD VENIPUNCTURE: CPT | Performed by: HOSPITALIST

## 2017-08-27 PROCEDURE — 85025 COMPLETE CBC W/AUTO DIFF WBC: CPT | Performed by: HOSPITALIST

## 2017-08-27 PROCEDURE — 25000132 ZZH RX MED GY IP 250 OP 250 PS 637: Performed by: HOSPITALIST

## 2017-08-27 PROCEDURE — 12000000 ZZH R&B MED SURG/OB

## 2017-08-27 PROCEDURE — 25000132 ZZH RX MED GY IP 250 OP 250 PS 637: Performed by: INTERNAL MEDICINE

## 2017-08-27 PROCEDURE — 25000132 ZZH RX MED GY IP 250 OP 250 PS 637: Performed by: PHYSICIAN ASSISTANT

## 2017-08-27 PROCEDURE — 80048 BASIC METABOLIC PNL TOTAL CA: CPT | Performed by: HOSPITALIST

## 2017-08-27 PROCEDURE — 99233 SBSQ HOSP IP/OBS HIGH 50: CPT | Performed by: HOSPITALIST

## 2017-08-27 PROCEDURE — 25000128 H RX IP 250 OP 636: Performed by: INTERNAL MEDICINE

## 2017-08-27 RX ORDER — CALCIUM CARBONATE 500 MG/1
500-1000 TABLET, CHEWABLE ORAL
Status: DISCONTINUED | OUTPATIENT
Start: 2017-08-27 | End: 2017-08-29 | Stop reason: HOSPADM

## 2017-08-27 RX ORDER — HYDROCODONE BITARTRATE AND ACETAMINOPHEN 5; 325 MG/1; MG/1
1 TABLET ORAL ONCE
Status: COMPLETED | OUTPATIENT
Start: 2017-08-27 | End: 2017-08-27

## 2017-08-27 RX ADMIN — ACETAMINOPHEN 650 MG: 325 TABLET, FILM COATED ORAL at 14:49

## 2017-08-27 RX ADMIN — ACETAMINOPHEN 650 MG: 325 TABLET, FILM COATED ORAL at 10:41

## 2017-08-27 RX ADMIN — CIPROFLOXACIN HYDROCHLORIDE 500 MG: 500 TABLET, FILM COATED ORAL at 19:15

## 2017-08-27 RX ADMIN — Medication 2 LOZENGE: at 07:43

## 2017-08-27 RX ADMIN — ACETAMINOPHEN 650 MG: 325 TABLET, FILM COATED ORAL at 19:15

## 2017-08-27 RX ADMIN — HYDROMORPHONE HYDROCHLORIDE 0.5 MG: 1 INJECTION, SOLUTION INTRAMUSCULAR; INTRAVENOUS; SUBCUTANEOUS at 01:57

## 2017-08-27 RX ADMIN — OMEPRAZOLE 20 MG: 20 CAPSULE, DELAYED RELEASE ORAL at 07:45

## 2017-08-27 RX ADMIN — CIPROFLOXACIN HYDROCHLORIDE 500 MG: 500 TABLET, FILM COATED ORAL at 07:43

## 2017-08-27 RX ADMIN — SODIUM CHLORIDE: 9 INJECTION, SOLUTION INTRAVENOUS at 01:51

## 2017-08-27 RX ADMIN — HYDROCODONE BITARTRATE AND ACETAMINOPHEN 1 TABLET: 5; 325 TABLET ORAL at 22:17

## 2017-08-27 RX ADMIN — CALCIUM CARBONATE (ANTACID) CHEW TAB 500 MG 1000 MG: 500 CHEW TAB at 02:25

## 2017-08-27 RX ADMIN — POTASSIUM CHLORIDE 20 MEQ: 1.5 POWDER, FOR SOLUTION ORAL at 09:12

## 2017-08-27 RX ADMIN — ACETAMINOPHEN 650 MG: 325 TABLET, FILM COATED ORAL at 06:25

## 2017-08-27 RX ADMIN — DIPHENHYDRAMINE HYDROCHLORIDE 25 MG: 25 CAPSULE ORAL at 09:18

## 2017-08-27 RX ADMIN — TAMSULOSIN HYDROCHLORIDE 0.4 MG: 0.4 CAPSULE ORAL at 07:44

## 2017-08-27 RX ADMIN — ACETAMINOPHEN 650 MG: 325 TABLET, FILM COATED ORAL at 01:51

## 2017-08-27 ASSESSMENT — PAIN DESCRIPTION - DESCRIPTORS: DESCRIPTORS: ACHING

## 2017-08-27 NOTE — PROVIDER NOTIFICATION
MD Notification    Notified Person:  MD    Notified Persons Name:Dr. Barnhart    Notification Date/Time:8/26/17 2526    Notification Interaction:  Talked with Physician    Purpose of Notification:Patient requesting to have medication for sleep.    Orders Received:Melatonin 3mg at HS prn.    Comments:

## 2017-08-27 NOTE — PLAN OF CARE
Problem: Goal Outcome Summary  Goal: Goal Outcome Summary  Outcome: No Change  Pt is alert/oriented x4. Luxembourgish speaking, knows some english. Tmax 101.3, resolved w/ tylenol prn 2x. Other VS stable on room air. Pt c/o headache 8/10, resolved w/ Dilaudid 1x.Up w/SBA and gait belt. Biswas patent draining adequate output - clear, yellow. Continue to monitor.

## 2017-08-27 NOTE — PROGRESS NOTES
[facility]    Hospitalist Progress Note    Date of Service (when I saw the patient): 08/27/2017    Assessment & Plan   Ms. Pereyra is a markedly pleasant 46 year old Icelandic speaking woman with prior nephrolithiasis who was admitted 8/24/2017 for severe sepsis due to obstructive right ureterolithiasis causing pyelonephritis.     1) Severe sepsis due to obstructive right ureterolithiasis causing pyelonephritis: On presentation she was ill appearing and hypotensive and required pressor infusion. Labs were notable for leukocytosis and CT showed a 0.4 mm right ureteral stone causing hydronephrosis and perinephric fat stranding. Urology was consulted and she underwent emergent cystoscopy with right ureteral stent placement on the morning of admission. Blood and urine cultures were sent and she was started on Vancomycin and Invanz. Her pressor was weaned off.    -- She is still spiking fevers at night although blood pressure seems to have stabilized.    -- Urine cultures show normal hermann and blood cultures show no growth to date    -- Will plan to transition to Cipro today and plan for likely 14 days of empiric treatment for pyelonephritis and obstructive UTI    -- Diet advanced; we will stop IV fluid today    -- Tylenol, narcotics as needed    -- She continues Flomax    -- Remove Biswas today, monitor for retention    -- Urology follow up in 2 weeks for further definitive stone management/extraction. The stent will remain in place until Urology is seen again.    -- She will likely need a work letter written once her discharge time is known, to refrain from work for the next two weeks    2) Drug rash: This has developed the morning of 8/26 as a diffuse, pruritic, maculopapular eruption. This seems most likely to have been due to Vancomycin. It is unclear if it is an infusion reaction or true drug rash.    -- Stopped Vancomycin    -- Transitioned Invanz to Cipro as above    -- Benadryl as needed for  itching    -- Monitor rash; it seems much improved today    3) GERD: PPI    4) Acute anemia: Mild, likely related to acute illness. Repeat CBC in AM to monitor further.    DVT Prophylaxis: Pneumatic Compression Devices  Code Status: Full Code    Disposition: Expected discharge perhaps as soon as tomorrow or the next day; today, she still spikes fevers and remains weak and debilitated. I will order PT and OT for disposition planning.    Naresh Hilton MD    Interval History   Headache and rash are much better; she is not walking much before feeling winded. She remains weak and tired. Had a fever 101 last night. No other new complaints.    -Data reviewed today: I reviewed all new labs and imaging results over the last 24 hours. I personally reviewed no images or EKG's today.    Physical Exam   Temp: 99.6  F (37.6  C) Temp src: Oral BP: 117/56   Heart Rate: 59 Resp: 16 SpO2: 95 % O2 Device: None (Room air)    Vitals:    08/25/17 0900 08/26/17 0500 08/27/17 0638   Weight: 96.1 kg (211 lb 13.8 oz) 99.6 kg (219 lb 9.3 oz) 104 kg (229 lb 4.5 oz)     Vital Signs with Ranges  Temp:  [98.2  F (36.8  C)-102.7  F (39.3  C)] 99.6  F (37.6  C)  Heart Rate:  [59-77] 59  Resp:  [16] 16  BP: (111-125)/(54-68) 117/56  SpO2:  [94 %-95 %] 95 %  I/O last 3 completed shifts:  In: 3212 [P.O.:240; I.V.:2972]  Out: 2950 [Urine:2950]    Constitutional: Alert and oriented to person, place and time; no apparent distress  HEENT: normocephalic moist mucus membranes  Respiratory: lungs clear to auscultation bilaterally  Cardiovascular: regular S1 S2 no murmurs rubs or gallops  GI: abdomen soft non tender non distended bowel sounds positive  Skin: diffuse maculopapular rash, ,uch improved today; good turgor  Musculoskeletal: no clubbing, cyanosis or edema  Neuro: EOMI; moves all four extremities  Psych: Appropriate affect, insight and judgment      Medications        ciprofloxacin  500 mg Oral Q12H UNIQUE     tamsulosin  0.4 mg Oral Daily      omeprazole (priLOSEC) CR capsule 20 mg  20 mg Oral QAM       Data     Recent Labs  Lab 08/27/17  0725 08/26/17  0613 08/25/17  1825 08/25/17  0140   WBC 10.4 Canceled, Test credited  --  16.4*   HGB 9.2* Canceled, Test credited  --  10.5*   MCV 72* Canceled, Test credited  --  73*   * Canceled, Test credited  --  140*    140  --  136   POTASSIUM 3.5 3.7 3.9 3.1*   CHLORIDE 109 110*  --  104   CO2 22 22  --  23   BUN 5* 7  --  9   CR 0.61 0.71  --  0.77   ANIONGAP 8 8  --  9   AMOL 8.1* 7.9*  --  8.6   * 114*  --  123*       No results found for this or any previous visit (from the past 24 hour(s)).

## 2017-08-27 NOTE — PLAN OF CARE
Problem: Goal Outcome Summary  Goal: Goal Outcome Summary  Outcome: Improving  VSS, max temp 99.6. RA. KATE&Ox4, Telugu speaking,  utilized. C/o R sided abd pain managed with tylenol and ice. VELASQUEZ this morning, diminished LS, congested cough. Encouraging IS and ambulation in jacques (SBA with GB), feeling much better this afternoon and strength improving. Advanced to regular diet, tolerating. C/o itchiness to upper back, no rash present, benadryl given and lotion applied. K 3.5, replaced, recheck tomorrow. Biswas out today, voiding adequately since. 2 diarrhea stools today. Plan for PT/OT eval tomorrow, then possible d/c.

## 2017-08-27 NOTE — PLAN OF CARE
Problem: Goal Outcome Summary  Goal: Goal Outcome Summary  Outcome: Improving  Pt is a/o x4. Tmax 102.6, other VSS. Tylenol given and blankets removed T down to 100.3.  Blood cultures within 24 hours so did not ray JONES.  Dilaudid 0.4mg for HA given x 1 with relief. IJ dressing CDI. Removed IV to R hand. Patient took shower this shift. Ambulating in room and hallway x1 with SBA and gait belt. BG checks WDL. Biswas patent drng clear yellow urine. Continue to monitor.

## 2017-08-28 LAB
BASOPHILS # BLD AUTO: 0 10E9/L (ref 0–0.2)
BASOPHILS NFR BLD AUTO: 0.4 %
DIFFERENTIAL METHOD BLD: ABNORMAL
EOSINOPHIL # BLD AUTO: 0.2 10E9/L (ref 0–0.7)
EOSINOPHIL NFR BLD AUTO: 2 %
ERYTHROCYTE [DISTWIDTH] IN BLOOD BY AUTOMATED COUNT: 19.7 % (ref 10–15)
FERRITIN SERPL-MCNC: 45 NG/ML (ref 8–252)
HCT VFR BLD AUTO: 30.1 % (ref 35–47)
HGB BLD-MCNC: 9.6 G/DL (ref 11.7–15.7)
IMM GRANULOCYTES # BLD: 0.1 10E9/L (ref 0–0.4)
IMM GRANULOCYTES NFR BLD: 0.8 %
IRON SATN MFR SERPL: 7 % (ref 15–46)
IRON SERPL-MCNC: 19 UG/DL (ref 35–180)
LYMPHOCYTES # BLD AUTO: 1.5 10E9/L (ref 0.8–5.3)
LYMPHOCYTES NFR BLD AUTO: 18.1 %
MCH RBC QN AUTO: 22.8 PG (ref 26.5–33)
MCHC RBC AUTO-ENTMCNC: 31.9 G/DL (ref 31.5–36.5)
MCV RBC AUTO: 72 FL (ref 78–100)
MONOCYTES # BLD AUTO: 1 10E9/L (ref 0–1.3)
MONOCYTES NFR BLD AUTO: 12.3 %
NEUTROPHILS # BLD AUTO: 5.6 10E9/L (ref 1.6–8.3)
NEUTROPHILS NFR BLD AUTO: 66.4 %
NRBC # BLD AUTO: 0 10*3/UL
NRBC BLD AUTO-RTO: 0 /100
PLATELET # BLD AUTO: 180 10E9/L (ref 150–450)
POTASSIUM SERPL-SCNC: 3.4 MMOL/L (ref 3.4–5.3)
RBC # BLD AUTO: 4.21 10E12/L (ref 3.8–5.2)
TIBC SERPL-MCNC: 270 UG/DL (ref 240–430)
WBC # BLD AUTO: 8.4 10E9/L (ref 4–11)

## 2017-08-28 PROCEDURE — 84132 ASSAY OF SERUM POTASSIUM: CPT | Performed by: HOSPITALIST

## 2017-08-28 PROCEDURE — 40000893 ZZH STATISTIC PT IP EVAL DEFER

## 2017-08-28 PROCEDURE — 25000132 ZZH RX MED GY IP 250 OP 250 PS 637: Performed by: INTERNAL MEDICINE

## 2017-08-28 PROCEDURE — 83550 IRON BINDING TEST: CPT | Performed by: HOSPITALIST

## 2017-08-28 PROCEDURE — 82728 ASSAY OF FERRITIN: CPT | Performed by: HOSPITALIST

## 2017-08-28 PROCEDURE — 25000132 ZZH RX MED GY IP 250 OP 250 PS 637: Performed by: HOSPITALIST

## 2017-08-28 PROCEDURE — 12000000 ZZH R&B MED SURG/OB

## 2017-08-28 PROCEDURE — 25000125 ZZHC RX 250: Performed by: INTERNAL MEDICINE

## 2017-08-28 PROCEDURE — 87493 C DIFF AMPLIFIED PROBE: CPT | Performed by: INTERNAL MEDICINE

## 2017-08-28 PROCEDURE — 99233 SBSQ HOSP IP/OBS HIGH 50: CPT | Performed by: INTERNAL MEDICINE

## 2017-08-28 PROCEDURE — 36415 COLL VENOUS BLD VENIPUNCTURE: CPT | Performed by: HOSPITALIST

## 2017-08-28 PROCEDURE — 83540 ASSAY OF IRON: CPT | Performed by: INTERNAL MEDICINE

## 2017-08-28 PROCEDURE — 85025 COMPLETE CBC W/AUTO DIFF WBC: CPT | Performed by: HOSPITALIST

## 2017-08-28 PROCEDURE — 83540 ASSAY OF IRON: CPT | Performed by: HOSPITALIST

## 2017-08-28 RX ORDER — SACCHAROMYCES BOULARDII 250 MG
250 CAPSULE ORAL 2 TIMES DAILY
Status: DISCONTINUED | OUTPATIENT
Start: 2017-08-28 | End: 2017-08-29 | Stop reason: HOSPADM

## 2017-08-28 RX ORDER — TAMSULOSIN HYDROCHLORIDE 0.4 MG/1
0.4 CAPSULE ORAL DAILY
Qty: 30 CAPSULE | Refills: 0 | Status: SHIPPED | OUTPATIENT
Start: 2017-08-28 | End: 2017-09-27

## 2017-08-28 RX ORDER — CIPROFLOXACIN 500 MG/1
500 TABLET, FILM COATED ORAL 2 TIMES DAILY
Qty: 22 TABLET | Refills: 0 | Status: SHIPPED | OUTPATIENT
Start: 2017-08-29 | End: 2017-09-09

## 2017-08-28 RX ORDER — OXYCODONE HYDROCHLORIDE 5 MG/1
5 TABLET ORAL EVERY 6 HOURS PRN
Status: DISCONTINUED | OUTPATIENT
Start: 2017-08-28 | End: 2017-08-29 | Stop reason: HOSPADM

## 2017-08-28 RX ORDER — SACCHAROMYCES BOULARDII 250 MG
250 CAPSULE ORAL 2 TIMES DAILY
Qty: 28 CAPSULE | Refills: 0 | Status: SHIPPED | OUTPATIENT
Start: 2017-08-29 | End: 2017-09-12

## 2017-08-28 RX ORDER — POTASSIUM CHLORIDE 1.5 G/1.58G
20-40 POWDER, FOR SOLUTION ORAL
Status: DISCONTINUED | OUTPATIENT
Start: 2017-08-28 | End: 2017-08-29 | Stop reason: HOSPADM

## 2017-08-28 RX ORDER — POTASSIUM CHLORIDE 29.8 MG/ML
20 INJECTION INTRAVENOUS
Status: DISCONTINUED | OUTPATIENT
Start: 2017-08-28 | End: 2017-08-29 | Stop reason: HOSPADM

## 2017-08-28 RX ORDER — POTASSIUM CL/LIDO/0.9 % NACL 10MEQ/0.1L
10 INTRAVENOUS SOLUTION, PIGGYBACK (ML) INTRAVENOUS
Status: DISCONTINUED | OUTPATIENT
Start: 2017-08-28 | End: 2017-08-29 | Stop reason: HOSPADM

## 2017-08-28 RX ORDER — ZOLPIDEM TARTRATE 5 MG/1
5 TABLET ORAL
Status: DISCONTINUED | OUTPATIENT
Start: 2017-08-28 | End: 2017-08-29 | Stop reason: HOSPADM

## 2017-08-28 RX ORDER — POTASSIUM CHLORIDE 7.45 MG/ML
10 INJECTION INTRAVENOUS
Status: DISCONTINUED | OUTPATIENT
Start: 2017-08-28 | End: 2017-08-29 | Stop reason: HOSPADM

## 2017-08-28 RX ORDER — POTASSIUM CHLORIDE 1500 MG/1
20-40 TABLET, EXTENDED RELEASE ORAL
Status: DISCONTINUED | OUTPATIENT
Start: 2017-08-28 | End: 2017-08-29 | Stop reason: HOSPADM

## 2017-08-28 RX ADMIN — ACETAMINOPHEN 650 MG: 325 TABLET, FILM COATED ORAL at 16:10

## 2017-08-28 RX ADMIN — CIPROFLOXACIN HYDROCHLORIDE 500 MG: 500 TABLET, FILM COATED ORAL at 20:15

## 2017-08-28 RX ADMIN — ACETAMINOPHEN 650 MG: 325 TABLET, FILM COATED ORAL at 06:21

## 2017-08-28 RX ADMIN — OXYCODONE HYDROCHLORIDE 5 MG: 5 TABLET ORAL at 13:34

## 2017-08-28 RX ADMIN — OMEPRAZOLE 20 MG: 20 CAPSULE, DELAYED RELEASE ORAL at 08:45

## 2017-08-28 RX ADMIN — CIPROFLOXACIN HYDROCHLORIDE 500 MG: 500 TABLET, FILM COATED ORAL at 08:45

## 2017-08-28 RX ADMIN — ZOLPIDEM TARTRATE 5 MG: 5 TABLET, FILM COATED ORAL at 21:45

## 2017-08-28 RX ADMIN — TAMSULOSIN HYDROCHLORIDE 0.4 MG: 0.4 CAPSULE ORAL at 08:45

## 2017-08-28 RX ADMIN — ONDANSETRON 4 MG: 4 TABLET, ORALLY DISINTEGRATING ORAL at 06:21

## 2017-08-28 RX ADMIN — Medication 250 MG: at 20:15

## 2017-08-28 ASSESSMENT — PAIN DESCRIPTION - DESCRIPTORS: DESCRIPTORS: HEADACHE

## 2017-08-28 NOTE — PROGRESS NOTES
[facility]    Hospitalist Progress Note    Date of Service (when I saw the patient): 08/28/2017    Assessment & Plan   Ayesha Pereyra is a 46 year old Montserratian-speaking woman with hx of kidney stones who was admitted on 8/25/2017 with septic shock due to pyelonephritis from obstructive right urolithiasis.    Septic shock due to pyelonephritis and obstructive right urolithiasis  Presented with acute right flank pain. On admission, she was febrile to 103.3 with leukocytosis to 16.4k and hypotension requiring pressors <24 hours. CT abd/pelvis showed 0.4 cm UVJ stone with mild-moderate right hydronephrosis. On admission, she was initiated on vancomycin, levofloxacin, and ertapenem, and underwent cystoscopy with right ureteral stent placement by Urology. Urine culture ultimately grew normal hermann and blood cultures (8/25) x2 have remained negative.  - Continues on ciprofloxacin to complete 14 day course  - Continues on tamsulosin  - Urology to schedule definitive stone removal surgery in 2-4 weeks    Diarrhea  Noted on 8/28  - Check C.diff  - Start probiotics BID    Tension headache  Noted on 8/28  - Tylenol, oxycodone prn    Drug rash  Developed maculopapular rash during admission, attributed to vancomycin. It has been improving  - Benadryl available prn    GERD  Chronic and stable on omeprazole    Chronic anemia  Hgb stable in 9-10 range  - Check iron studies    FEN: Regular diet  DVT Prophylaxis: Pneumatic Compression Devices  Code Status: Full Code    Disposition: Expected discharge tomorrow once headache improved and C.diff ruled out    Donna Matamoros    Interval History   No events overnight. Has remained afebrile >24 hours. Reports ongoing global headache. Has also developed diarrhea: reports 2 large loose stools every time she eats. Denies nausea/vomiting.   - Tylenol/oxycodone for headache  - Check C.diff  - Start probiotics  - Defer discharge today, d/c likely tomorrow    -Data  reviewed today: I reviewed all new labs and imaging results over the last 24 hours. I personally reviewed no images or EKG's today.    Physical Exam   Temp: 98.6  F (37  C) Temp src: Oral BP: 120/62   Heart Rate: 61 Resp: 16 SpO2: 96 % O2 Device: None (Room air)    Vitals:    08/26/17 0500 08/27/17 0638 08/28/17 0623   Weight: 99.6 kg (219 lb 9.3 oz) 104 kg (229 lb 4.5 oz) 96.8 kg (213 lb 6.5 oz)     Vital Signs with Ranges  Temp:  [98.6  F (37  C)-99.7  F (37.6  C)] 98.6  F (37  C)  Heart Rate:  [57-69] 61  Resp:  [16-18] 16  BP: (120-135)/(48-62) 120/62  SpO2:  [94 %-98 %] 96 %  I/O last 3 completed shifts:  In: 1361 [P.O.:480; I.V.:881]  Out: 4350 [Urine:4350]    Constitutional: Appears comfortable, NAD  Respiratory: Breathing non-labored. Lungs CTAB - no wheezes, crackles, or rhonchi  Cardiovascular: Heart RRR, no m/r/g. No pedal edema  GI: +BS, abd soft/NT  Neuro: Alert and appropriate, GARCIA  Psych: Normal affect    Medications        saccharomyces boulardii  250 mg Oral BID     ciprofloxacin  500 mg Oral Q12H UNIQUE     tamsulosin  0.4 mg Oral Daily     omeprazole (priLOSEC) CR capsule 20 mg  20 mg Oral QAM     Data     Recent Labs  Lab 08/28/17  0637 08/27/17  0725 08/26/17  0613  08/25/17  0140   WBC 8.4 10.4 Canceled, Test credited  --  16.4*   HGB 9.6* 9.2* Canceled, Test credited  --  10.5*   MCV 72* 72* Canceled, Test credited  --  73*    146* Canceled, Test credited  --  140*   NA  --  139 140  --  136   POTASSIUM 3.4 3.5 3.7  < > 3.1*   CHLORIDE  --  109 110*  --  104   CO2  --  22 22  --  23   BUN  --  5* 7  --  9   CR  --  0.61 0.71  --  0.77   ANIONGAP  --  8 8  --  9   AMOL  --  8.1* 7.9*  --  8.6   GLC  --  107* 114*  --  123*   < > = values in this interval not displayed.    No results found for this or any previous visit (from the past 24 hour(s)).

## 2017-08-28 NOTE — DISCHARGE INSTRUCTIONS
The inpatient Financial Office will attempt to meet with you prior to your hospital discharge.  However, this is the   Central Billing Office Phone # [phone], if you have questions about your hospital bill or your Medical/ Financial concerns.   This line should offer the choice to speak w/ a  whom is [employer].
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
## 2017-08-28 NOTE — PLAN OF CARE
Problem: Goal Outcome Summary  Goal: Goal Outcome Summary  Outcome: No Change  TMAX 99.3, all other VSS. C/o of headache pain, unrelieved by Tylenol. PRN Oxycodone ordered and given x1 with better relief. Voiding adequately. Patient reported having two loose stools. Educated to leave sample in bathroom for nursing to see. Placed in enteric isolation. Will continue to monitor. Ambulates with SBA from family but independent in room. Plan for possible discharge home tomorrow.

## 2017-08-28 NOTE — PROGRESS NOTES
Called by nursing staff with report of headache, order submitted for one time dose of hydrocodone from home pta med list.

## 2017-08-28 NOTE — PLAN OF CARE
Problem: Goal Outcome Summary  Goal: Goal Outcome Summary  OT eval attempted, no  present/available.

## 2017-08-28 NOTE — PROVIDER NOTIFICATION
MD Notification    Notified Person:  MD    Notified Persons Name:Dr. Vega    Notification Date/Time:8/27/17 2156    Notification Interaction:  Talked with Physician    Purpose of Notification:Patient having a headache all day long, wanting something stronger.    Orders Received:Norco x 1 at 2200.    Comments:

## 2017-08-28 NOTE — PLAN OF CARE
Problem: Goal Outcome Summary  Goal: Goal Outcome Summary  Outcome: No Change  Patient is A&Ox4. VSS ex Tmax 99.7. C/o of headache pain, gave Tylenol x1 and c/o nausea, gave Zofran x1. Lungs sounds were clear. Tolerating regular diet. Voiding adequately. Up with SBA. Possible discharge 8/28 after PT/OT evaluation. Will continue to monitor.

## 2017-08-28 NOTE — PLAN OF CARE
Problem: Goal Outcome Summary  Goal: Goal Outcome Summary  PT: Received eval orders.  Per RN, pt mobilizing independently, pt and family confirm no mobility concerns, primarily concern is headache.  Pt with questions regarding walking, education provided on walking to progress endurance after discharge.  No acute PT/OT needs identified, pt and family in agreement.     Discharge Planner PT   Patient plan for discharge: home with family assist  Current status: mobilizing independently, most limited by headache  Barriers to return to prior living situation: lives alone, stairs  Recommendations for discharge: home with family assist, family states able to provide 24/7, walking  Rationale for recommendations: pt able to walk independently to progress endurance, family will be present to assist with ADLs/IADLs as needed.       Entered by: Tiffani Barahona 08/28/2017 12:36 PM

## 2017-08-28 NOTE — PLAN OF CARE
Problem: Goal Outcome Summary  Goal: Goal Outcome Summary  Outcome: Improving  VSS. A&Ox4, Ukrainian speaking. C/o R sided abd pain managed with tylenol and ice. C/o headache this shift and day shift, one time order for norco given.  Diminished LS. Encouraging IS and ambulation in jacques (SBA with GB).  Advanced to regular diet, tolerating.  Biswas out today, voiding adequately since.  Plan for PT/OT eval tomorrow, then possible d/c. Transitioned to oral Cipro.  Will continue to monitor.

## 2017-08-29 VITALS
WEIGHT: 210.54 LBS | HEART RATE: 72 BPM | RESPIRATION RATE: 18 BRPM | TEMPERATURE: 98.1 F | DIASTOLIC BLOOD PRESSURE: 57 MMHG | BODY MASS INDEX: 33.98 KG/M2 | OXYGEN SATURATION: 93 % | SYSTOLIC BLOOD PRESSURE: 125 MMHG

## 2017-08-29 LAB
C DIFF TOX B STL QL: NEGATIVE
SPECIMEN SOURCE: NORMAL

## 2017-08-29 PROCEDURE — 25000132 ZZH RX MED GY IP 250 OP 250 PS 637: Performed by: INTERNAL MEDICINE

## 2017-08-29 PROCEDURE — 25000132 ZZH RX MED GY IP 250 OP 250 PS 637: Performed by: HOSPITALIST

## 2017-08-29 PROCEDURE — 99239 HOSP IP/OBS DSCHRG MGMT >30: CPT | Performed by: INTERNAL MEDICINE

## 2017-08-29 RX ORDER — OXYCODONE HYDROCHLORIDE 5 MG/1
5 TABLET ORAL EVERY 6 HOURS PRN
Qty: 15 TABLET | Refills: 0 | Status: SHIPPED | OUTPATIENT
Start: 2017-08-29

## 2017-08-29 RX ADMIN — OXYCODONE HYDROCHLORIDE 5 MG: 5 TABLET ORAL at 08:14

## 2017-08-29 RX ADMIN — OMEPRAZOLE 20 MG: 20 CAPSULE, DELAYED RELEASE ORAL at 08:05

## 2017-08-29 RX ADMIN — CIPROFLOXACIN HYDROCHLORIDE 500 MG: 500 TABLET, FILM COATED ORAL at 08:05

## 2017-08-29 RX ADMIN — Medication 250 MG: at 08:05

## 2017-08-29 RX ADMIN — TAMSULOSIN HYDROCHLORIDE 0.4 MG: 0.4 CAPSULE ORAL at 08:05

## 2017-08-29 NOTE — PLAN OF CARE
Problem: Goal Outcome Summary  Goal: Goal Outcome Summary  Outcome: Improving  TMAX 100.2 all other VSS. C/o of headache pain, unrelieved by Tylenol. PRN Oxycodone ordered and given x1 with better relief. Voiding adequately.Stool sample obtained this shift, c-diff pending. Placed in enteric isolation. Will continue to monitor. Ambulates with SBA from family but independent in room. C/o not sleeping for 3 nights.  New orders for Ambien given this HS.  Plan for possible discharge home tomorrow.

## 2017-08-29 NOTE — DISCHARGE SUMMARY
[facility]    Discharge Summary  Hospitalist    Date of Admission:  8/25/2017  Date of Discharge:  8/29/2017  Discharging Provider: Donna Matamoros  Date of Service (when I saw the patient): 08/29/17    Discharge Diagnoses     1. Septic shock  2. Pyelonephritis  3. Obstructive right urolithiasis  4. Diarrhea  5. Tension headache  6. Drug rash  7. GERD  8. Chronic iron deficiency anemia    History of Present Illness   Please see H&P by Dr. Josh Barnhart on 8/25/2017    Hospital Course   Ayesha Pereyra is a 46 year old Turkish-speaking woman with hx of kidney stones who was admitted on 8/25/2017 with septic shock due to pyelonephritis from obstructive right urolithiasis. She is now s/p right ureteral stent placement with plan for ongoing antibiotics and stone removal surgery in 2-4 weeks. A work note was provided at discharge.  The following problems were addressed during her hospitalization:    Septic shock due to pyelonephritis and obstructive right urolithiasis  Presented with acute right flank pain. On admission, she was febrile to 103.3 with leukocytosis to 16.4k and hypotension requiring pressors <24 hours. CT abd/pelvis showed 0.4 cm UVJ stone with mild-moderate right hydronephrosis. On admission, she was initiated on vancomycin, levofloxacin, and ertapenem, and underwent cystoscopy with right ureteral stent placement by Urology. Urine culture ultimately grew normal hermann and blood cultures (8/25) x2 have remained negative.  - She will be discharged with ciprofloxacin to complete a 14 day course  - Continues on tamsulosin at discharge  - Urology to schedule definitive stone removal surgery in 2-4 weeks     Diarrhea  Noted on 8/28. C.diff negative. Suspect antibiotic-related enteritis  - She will be discharged with probiotics BID     Tension headache  Noted on 8/28 and resolved with supportive cares     Drug rash  Developed maculopapular rash during admission, attributed to  vancomycin. It has continued to improve throughout her hospital stay     GERD  Chronic and stable on omeprazole     Chronic iron deficiency anemia  Hgb stable in 9-10 range. Iron studies during admission showed persistent iron deficiency  - Continues on PTA iron tabs at discharge    Donna Lala Matamoros    Significant Results and Procedures   As noted    Pending Results   These results will be followed up by me  Unresulted Labs Ordered in the Past 30 Days of this Admission     Date and Time Order Name Status Description    8/25/2017 0139 Blood culture Preliminary     8/25/2017 0139 Blood culture Preliminary           Code Status   Full Code       Primary Care Physician   Physician No Ref-Primary    Physical Exam   Temp: 98.1  F (36.7  C) Temp src: Oral BP: 125/57   Heart Rate: 56 Resp: 18 SpO2: 93 % O2 Device: None (Room air)    Vitals:    08/27/17 0638 08/28/17 0623 08/29/17 0628   Weight: 104 kg (229 lb 4.5 oz) 96.8 kg (213 lb 6.5 oz) 95.5 kg (210 lb 8.6 oz)     Vital Signs with Ranges  Temp:  [98.1  F (36.7  C)-100.2  F (37.9  C)] 98.1  F (36.7  C)  Heart Rate:  [56-62] 56  Resp:  [16-18] 18  BP: (122-139)/(54-80) 125/57  SpO2:  [93 %-96 %] 93 %  I/O last 3 completed shifts:  In: -   Out: 2450 [Urine:2450]    Constitutional: Appears comfortable, NAD  Respiratory: Breathing non-labored. Lungs CTAB - no wheezes/crackles/rhonchi  Cardiovascular: Heart RRR, no m/r/g. No pedal edema.   GI: +BS. Abd soft/NT  Skin: Warm and dry. Maculopapular rash improving  Musculoskeletal: Normal muscle bulk and tone  Neurologic: Alert and appropriate. GARCIA  Psychiatric: Normal affect    Discharge Disposition   Discharged to home  Condition at discharge: Stable    Consultations This Hospital Stay     1. UROLOGY IP CONSULT  2. PHYSICAL THERAPY ADULT IP CONSULT  3. OCCUPATIONAL THERAPY ADULT IP CONSULT  4. PHARMACY LIAISON FOR MEDICATION COVERAGE CONSULT  5. CARE COORDINATOR IP CONSULT    Time Spent on this Encounter   Donna MCRAE  Saturnino, personally saw the patient today and spent 40 minutes discharging this patient.    Discharge Orders     Discharge Instructions   You have a right ureteral stent to help allow drainage from your kidney to bladder. You should increase your fluid intake while you have a stent in place to ensure good drainage of urine.   Your stent is not permanent, it requires routine exchanges or removal with your urologist based on your plan of care.  While the stent is in place you may notice:  -Flank / back/ side  pain on the same side of your body as the stent  -Blood tinged urine that may come and go  -Sensation that you need to urinate more frequently   -Bladder cramping/discomfort at the end of urination  These are all normal to experience with the stent in place. However, if these symptoms become too bothersome to tolerate, please alert your urologist.     Follow Up   Dr. Grady's surgery scheduler, Rosalba, is in the process of scheduling your procedure to remove the kidney stone. Please allow her 3-4 business days to schedule the procedure and then call you at home. If you have not heard from her after that time, you can call Rosalba at [phone] to inquire about when it will be scheduled. Rosalba is aware that you will benefit from a Russian Speaking  for contact via phone and in office.     [facility]  [address] [place] [address], [address]  [place]. [address]     Reason for your hospital stay   Severe infection due to kidney stone - a stent was placed     Follow-up and recommended labs and tests    Follow up with primary care provider within 2 weeks for hospital follow up.  No follow up labs or test are needed.     Activity   Your activity upon discharge: activity as tolerated     Diet   Follow this diet upon discharge: Regular diet       Discharge Medications   Current Discharge Medication List      START taking these medications    Details   oxyCODONE (ROXICODONE) 5 MG IR tablet Take 1  tablet (5 mg) by mouth every 6 hours as needed for severe pain  Qty: 15 tablet, Refills: 0    Associated Diagnoses: Kidney stone on right side      saccharomyces boulardii (FLORASTOR) 250 MG capsule Take 1 capsule (250 mg) by mouth 2 times daily for 14 days  Qty: 28 capsule, Refills: 0    Associated Diagnoses: Diarrhea, unspecified type      ciprofloxacin (CIPRO) 500 MG tablet Take 1 tablet (500 mg) by mouth 2 times daily for 11 days  Qty: 22 tablet, Refills: 0    Associated Diagnoses: Kidney stone on right side         CONTINUE these medications which have CHANGED    Details   tamsulosin (FLOMAX) 0.4 MG capsule Take 1 capsule (0.4 mg) by mouth daily  Qty: 30 capsule, Refills: 0    Associated Diagnoses: Calculus of kidney         CONTINUE these medications which have NOT CHANGED    Details   ferrous sulfate (IRON) 325 (65 FE) MG tablet Take 325 mg by mouth daily (with breakfast)      oxybutynin (DITROPAN-XL) 10 MG 24 hr tablet Take 10 mg by mouth daily       carboxymethylcellulose (REFRESH PLUS) 0.5 % SOLN ophthalmic solution as needed for dry eyes      OMEPRAZOLE PO Take 20 mg by mouth every morning      acetaminophen (TYLENOL) 500 MG tablet Take 1-2 tablets by mouth every 6 hours as needed for pain.  Qty: 1 tablet, Refills: 0         STOP taking these medications       HYDROcodone-acetaminophen (NORCO) 5-325 MG per tablet Comments:   Reason for Stopping:         ibuprofen (ADVIL/MOTRIN) 200 MG tablet Comments:   Reason for Stopping:         nitroFURantoin, macrocrystal-monohydrate, (MACROBID) 100 MG capsule Comments:   Reason for Stopping:             Allergies   Allergies   Allergen Reactions     Keflex [Cephalexin]      Data   Most Recent 3 CBC's:  Recent Labs   Lab Test  08/28/17   0637  08/27/17   0725  08/26/17   0613   WBC  8.4  10.4  Canceled, Test credited   HGB  9.6*  9.2*  Canceled, Test credited   MCV  72*  72*  Canceled, Test credited   PLT  180  146*  Canceled, Test credited      Most Recent 3  BMP's:  Recent Labs   Lab Test  08/28/17   0637  08/27/17   0725  08/26/17   0613   08/25/17   0140   NA   --   139  140   --   136   POTASSIUM  3.4  3.5  3.7   < >  3.1*   CHLORIDE   --   109  110*   --   104   CO2   --   22  22   --   23   BUN   --   5*  7   --   9   CR   --   0.61  0.71   --   0.77   ANIONGAP   --   8  8   --   9   AMOL   --   8.1*  7.9*   --   8.6   GLC   --   107*  114*   --   123*    < > = values in this interval not displayed.     Most Recent 2 LFT's:  Recent Labs   Lab Test  08/15/12   2300  07/04/12   1636   AST  21  20   ALT  27  19   ALKPHOS  68  72   BILITOTAL  0.2  0.3     Most Recent INR's and Anticoagulation Dosing History:  Anticoagulation Dose History     There is no flowsheet data to display.        Most Recent 3 Troponin's:  Recent Labs   Lab Test  07/04/12   1636   TROPI  <0.012     Most Recent Cholesterol Panel:No lab results found.  Most Recent 6 Bacteria Isolates From Any Culture (See EPIC Reports for Culture Details):  Recent Labs   Lab Test  08/25/17   0312  08/25/17   0200  08/25/17   0140   CULT  10,000 to 50,000 colonies/mL  mixed urogenital hermann  Susceptibility testing not routinely done    No growth after 4 days  No growth after 4 days     Most Recent TSH, T4 and A1c Labs:No lab results found.  Results for orders placed or performed during the hospital encounter of 08/25/17   XR Chest Port 1 View    Narrative    XR CHEST PORT 1 VW  8/25/2017 5:00 AM     INDICATION: Line placement.    COMPARISON: None.      Impression    IMPRESSION: Right IJ line tip in SVC. Shallow inspiration accentuates  interstitial markings and heart size. No focal air-space disease. Mild  torsion aorta.    CURRY ONEIL MD   XR Surgery ZACKARY L/T 5 Min Fluoro w Stills    Narrative    SURGERY C-ARM FLUOROSCOPY LESS THAN FIVE MINUTES WITH STILLS   8/25/2017 6:35 AM     HISTORY: Right ureteral stent placement.    COMPARISON: None.      Impression    IMPRESSION: Three spot fluoroscopic images were  obtained during  placement of right ureteral stent. By completion, ureteral stent  appears to be in appropriate position overlying the expected location  of right kidney. No contrast administered. Total fluoroscopy time 0.5  minutes.    KALPANA GEORGES MD

## 2017-08-29 NOTE — PLAN OF CARE
Problem: Goal Outcome Summary  Goal: Goal Outcome Summary  Outcome: No Change   Patient is A&Ox4. VSS ex Tmax 100.2. Denied pain overnight but has a history of c/o headache. PRN tylenol and oxycodone available. Voiding adequately. C-diff negative after loose stool samples collected. Up independently in room and SBA in halls. Plan for possible discharge home 8/29. Will continue to monitor.

## 2017-08-29 NOTE — PROGRESS NOTES
Patient a/o up to bathroom eatting,c/o pain med with relief. Discharged with a Czech intepretor with meds paperwork and belongings with daughter.

## 2017-08-31 LAB
BACTERIA SPEC CULT: NO GROWTH
BACTERIA SPEC CULT: NO GROWTH
Lab: NORMAL
Lab: NORMAL
SPECIMEN SOURCE: NORMAL
SPECIMEN SOURCE: NORMAL

## 2017-09-05 ENCOUNTER — OFFICE VISIT (OUTPATIENT)
Dept: FAMILY MEDICINE | Facility: CLINIC | Age: 47
End: 2017-09-05

## 2017-09-05 VITALS
RESPIRATION RATE: 16 BRPM | WEIGHT: 203 LBS | OXYGEN SATURATION: 99 % | HEART RATE: 72 BPM | BODY MASS INDEX: 32.62 KG/M2 | SYSTOLIC BLOOD PRESSURE: 100 MMHG | HEIGHT: 66 IN | DIASTOLIC BLOOD PRESSURE: 68 MMHG | TEMPERATURE: 98.3 F

## 2017-09-05 DIAGNOSIS — N20.1 RIGHT URETERAL STONE: ICD-10-CM

## 2017-09-05 DIAGNOSIS — N12 PYELONEPHRITIS: Primary | ICD-10-CM

## 2017-09-05 PROCEDURE — 99214 OFFICE O/P EST MOD 30 MIN: CPT | Performed by: PHYSICIAN ASSISTANT

## 2017-09-05 NOTE — PROGRESS NOTES
SUBJECTIVE:   Ayesha Pereyra is a 46 year old female who presents to clinic today for the following health issues:    Patient visits with Armenian speaking interpretor.     Hospital Follow-up Visit:    Hospital/Nursing Home/IP Rehab Facility: [facility]  Date of Admission: 08/25/2017  Date of Discharge: 08/29/2017  Reason(s) for Admission: Pyelonephritis, Sepsis            Problems taking medications regularly:  None       Medication changes since discharge: None       Problems adhering to non-medication therapy:  None    Summary of hospitalization:  [facility] discharge summary reviewed  Diagnostic Tests/Treatments reviewed.  Follow up needed: urology follow up pending  Other Healthcare Providers Involved in Patient s Care:         urology  Update since discharge: improved.     Post Discharge Medication Reconciliation: discharge medications reconciled, continue medications without change.  Plan of care communicated with patient     Coding guidelines for this visit:  Type of Medical   Decision Making Face-to-Face Visit       within 7 Days of discharge Face-to-Face Visit        within 14 days of discharge   Moderate Complexity [id number]   High Complexity [id number]          Patient states she is feeling better since her d/c though continues to have dysuria. No back pain, abdominal pain. She has also continued to have some loose stools but no liquid stool and no blood in the stool. No fever/chills, nausea, vomiting, body aches. No cough, shortness of breath or chest pains. No hematuria.      Problem list and histories reviewed & adjusted, as indicated.  Additional history: as documented    Patient Active Problem List   Diagnosis     Right ureteral stone     Pyelonephritis     Past Surgical History:   Procedure Laterality Date     COMBINED CYSTOSCOPY, INSERT STENT URETER(S) Right 8/25/2017    Procedure: COMBINED CYSTOSCOPY, INSERT STENT URETER(S);  Cystoscopy,  Right  Ureteral Stent  "Placement;  Surgeon: Ousmane Grady MD;  Location: [facility] OR     GYN SURGERY       HYSTERECTOMY  1995       Social History   Substance Use Topics     Smoking status: Never Smoker     Smokeless tobacco: Never Used     Alcohol use No     History reviewed. No pertinent family history.      Current Outpatient Prescriptions   Medication Sig Dispense Refill     saccharomyces boulardii (FLORASTOR) 250 MG capsule Take 1 capsule (250 mg) by mouth 2 times daily for 14 days 28 capsule 0     ciprofloxacin (CIPRO) 500 MG tablet Take 1 tablet (500 mg) by mouth 2 times daily for 11 days 22 tablet 0     tamsulosin (FLOMAX) 0.4 MG capsule Take 1 capsule (0.4 mg) by mouth daily 30 capsule 0     ferrous sulfate (IRON) 325 (65 FE) MG tablet Take 325 mg by mouth daily (with breakfast)       oxybutynin (DITROPAN-XL) 10 MG 24 hr tablet Take 10 mg by mouth daily        carboxymethylcellulose (REFRESH PLUS) 0.5 % SOLN ophthalmic solution as needed for dry eyes       OMEPRAZOLE PO Take 20 mg by mouth every morning       acetaminophen (TYLENOL) 500 MG tablet Take 1-2 tablets by mouth every 6 hours as needed for pain. 1 tablet 0     oxyCODONE (ROXICODONE) 5 MG IR tablet Take 1 tablet (5 mg) by mouth every 6 hours as needed for severe pain (Patient not taking: Reported on 9/5/2017) 15 tablet 0         Reviewed and updated as needed this visit by clinical staffTobacco  Allergies  Meds  Med Hx  Surg Hx  Fam Hx  Soc Hx      Reviewed and updated as needed this visit by Provider         ROS:  As above    OBJECTIVE:     /68 (BP Location: Right arm, Patient Position: Sitting, Cuff Size: Adult Regular)  Pulse 72  Temp 98.3  F (36.8  C) (Tympanic)  Resp 16  Ht 5' 6\" (1.676 m)  Wt 203 lb (92.1 kg)  LMP 08/21/2017 (Approximate)  SpO2 99%  Breastfeeding? No  BMI 32.77 kg/m2  Body mass index is 32.77 kg/(m^2).  GENERAL: healthy, alert and no distress  RESP: lungs clear to auscultation - no rales, rhonchi or wheezes  CV: regular " rate and rhythm, normal S1 S2, no S3 or S4, no murmur, click or rub, no peripheral edema and peripheral pulses strong  ABDOMEN: soft, nontender, no hepatosplenomegaly, no masses and bowel sounds normal  SKIN: no suspicious lesions or rashes  BACK: no CVA tenderness, no paralumbar tenderness  PSYCH: mentation appears normal, affect normal/bright    Diagnostic Test Results:  none     ASSESSMENT/PLAN:         ICD-10-CM    1. Pyelonephritis N12    2. Right ureteral stone N20.1        Improved from discharge with persisting dysuria. No evidence of worsening infection at this time. She is unsure when her urology appt is and she is going to call them with the  available today.     Patient Instructions     Follow Up   Dr. Grady's surgery scheduler, Rosalba, is in the process of scheduling your procedure to remove the kidney stone. Please allow her 3-4 business days to schedule the procedure and then call you at home. If you have not heard from her after that time, you can call Rosalba at [phone] to inquire about when it will be scheduled. Rosalba is aware that you will benefit from a Uzbek Speaking  for contact via phone and in office.     [facility]  [address] [place] [address], [address]  [place]. [address]     COMPLETE YOUR CIPROFLOXACIN ANTIBIOTIC AS PRESCRIBED    25 total minutes spent with the patient, > 50% face to face discussing the patients concerns and addressing questions in the above assessment and plan.         Nicole Joy Siegler, PA-C  [facility]

## 2017-09-05 NOTE — PATIENT INSTRUCTIONS
Follow Up   Dr. Grady's surgery scheduler, Rosalba, is in the process of scheduling your procedure to remove the kidney stone. Please allow her 3-4 business days to schedule the procedure and then call you at home. If you have not heard from her after that time, you can call Rosalba at [phone] to inquire about when it will be scheduled. Rosalba is aware that you will benefit from a Armenian Speaking  for contact via phone and in office.     [facility]  [address] [place] [address], [address]  [place]. [address]     COMPLETE YOUR CIPROFLOXACIN ANTIBIOTIC AS PRESCRIBED

## 2017-09-05 NOTE — MR AVS SNAPSHOT
After Visit Summary   9/5/2017    Ayesha Pereyra    MRN: [id number]           Patient Information     Date Of Birth          1970        Visit Information        Provider Department      9/5/2017 9:45 AM Siegler, Nicole Joy, PA-C; [place] [facility]        Today's Diagnoses     Pyelonephritis    -  1    Right ureteral stone          Care Instructions    Follow Up   Dr. Grady's surgery scheduler, Rosalba, is in the process of scheduling your procedure to remove the kidney stone. Please allow her 3-4 business days to schedule the procedure and then call you at home. If you have not heard from her after that time, you can call Rosalba at [phone] to inquire about when it will be scheduled. Rosalba is aware that you will benefit from a Afghan Speaking  for contact via phone and in office.     [facility]  [address] [place] [address], [address] [place] [address]. [phone]     COMPLETE YOUR CIPROFLOXACIN ANTIBIOTIC AS PRESCRIBED          Follow-ups after your visit        Who to contact     If you have questions or need follow up information about today's clinic visit or your schedule please contact [facility] directly at [phone].  Normal or non-critical lab and imaging results will be communicated to you by MyChart, letter or phone within 4 business days after the clinic has received the results. If you do not hear from us within 7 days, please contact the clinic through MyChart or phone. If you have a critical or abnormal lab result, we will notify you by phone as soon as possible.  Submit refill requests through [facility] or call your pharmacy and they will forward the refill request to us. Please allow 3 business days for your refill to be completed.          Additional Information About Your Visit        [employer] Information     [facility] lets you send messages to your doctor, view your test  "results, renew your prescriptions, schedule appointments and more. To sign up, go to [email or web address] . Click on \"Log in\" on the left side of the screen, which will take you to the Welcome page. Then click on \"Sign up Now\" on the right side of the page.     You will be asked to enter the access code listed below, as well as some personal information. Please follow the directions to create your username and password.     Your access code is: [email or web address]  Expires: 2017 12:06 AM     Your access code will  in 90 days. If you need help or a new code, please call your [facility] clinic or [phone].        Care EveryWhere ID     This is your Care EveryWhere ID. This could be used by other organizations to access your [facility] medical records  [id number]        Your Vitals Were     Pulse Temperature Respirations Height Last Period Pulse Oximetry    72 98.3  F (36.8  C) (Tympanic) 16 5' 6\" (1.676 m) 2017 (Approximate) 99%    Breastfeeding? BMI (Body Mass Index)                No 32.77 kg/m2           Blood Pressure from Last 3 Encounters:   17 100/68   17 125/57   17 133/73    Weight from Last 3 Encounters:   17 203 lb (92.1 kg)   17 210 lb 8.6 oz (95.5 kg)   08/15/12 226 lb (102.5 kg)              Today, you had the following     No orders found for display       Primary Care Provider    Physician No Ref-Primary       No address on file        Equal Access to Services     [facility]: Hadii jennifer mancuso Sokatie, waaxda luqadaha, qaybta kaalmada jose g gallardo . So [facility] [phone].    ATENCIÓN: Si habla español, tiene a rios disposición servicios gratuitos de asistencia lingüística. Llame al [phone].    We comply with applicable federal civil rights laws and [place] laws. We do not discriminate on the basis of race, color, national origin, age, disability sex, sexual orientation or gender identity.            Thank " you!     Thank you for choosing [facility]  for your care. Our goal is always to provide you with excellent care. Hearing back from our patients is one way we can continue to improve our services. Please take a few minutes to complete the written survey that you may receive in the mail after your visit with us. Thank you!             Your Updated Medication List - Protect others around you: Learn how to safely use, store and throw away your medicines at www.disposemymeds.org.          This list is accurate as of: 9/5/17 11:59 PM.  Always use your most recent med list.                   Brand Name Dispense Instructions for use Diagnosis    acetaminophen 500 MG tablet    TYLENOL    1 tablet    Take 1-2 tablets by mouth every 6 hours as needed for pain.        carboxymethylcellulose 0.5 % Soln ophthalmic solution    REFRESH PLUS     as needed for dry eyes        ciprofloxacin 500 MG tablet    CIPRO    22 tablet    Take 1 tablet (500 mg) by mouth 2 times daily for 11 days    Kidney stone on right side       ferrous sulfate 325 (65 FE) MG tablet    IRON     Take 325 mg by mouth daily (with breakfast)        OMEPRAZOLE PO      Take 20 mg by mouth every morning        oxybutynin 10 MG 24 hr tablet    DITROPAN-XL     Take 10 mg by mouth daily        oxyCODONE 5 MG IR tablet    ROXICODONE    15 tablet    Take 1 tablet (5 mg) by mouth every 6 hours as needed for severe pain    Kidney stone on right side       saccharomyces boulardii 250 MG capsule    FLORASTOR    28 capsule    Take 1 capsule (250 mg) by mouth 2 times daily for 14 days    Diarrhea, unspecified type       tamsulosin 0.4 MG capsule    FLOMAX    30 capsule    Take 1 capsule (0.4 mg) by mouth daily    Calculus of kidney

## 2017-09-05 NOTE — NURSING NOTE
"Chief Complaint   Patient presents with     Hospital F/U     FSH--Right sided abdominal discomfort     Dysuria       Initial /68 (BP Location: Right arm, Patient Position: Sitting, Cuff Size: Adult Regular)  Pulse 72  Temp 98.3  F (36.8  C) (Tympanic)  Resp 16  Ht 5' 6\" (1.676 m)  Wt 203 lb (92.1 kg)  LMP 08/21/2017 (Approximate)  SpO2 99%  Breastfeeding? No  BMI 32.77 kg/m2 Estimated body mass index is 32.77 kg/(m^2) as calculated from the following:    Height as of this encounter: 5' 6\" (1.676 m).    Weight as of this encounter: 203 lb (92.1 kg).  Medication Reconciliation: complete     Mechelle Marie LPN  "

## 2017-09-08 ENCOUNTER — TELEPHONE (OUTPATIENT)
Dept: FAMILY MEDICINE | Facility: CLINIC | Age: 47
End: 2017-09-08

## 2017-09-08 NOTE — TELEPHONE ENCOUNTER
Reason for Call:  Form, our goal is to have forms completed with 72 hours, however, some forms may require a visit or additional information.    Type of letter, form or note:  medical    Who is the form from?: [facility]     Where did the form come from: form was faxed in    What clinic location was the form placed at?: [facility]    Where the form was placed: 's Box: Nicole Siegler, PA    What number is listed as a contact on the form?: Call patient to confirm if she would like to  the form or have it faxed to # [phone]       Additional comments: Certification of Health Care Provider - FMLA    Call taken on 9/8/2017 at 1:24 PM by Ton Warren

## 2017-09-08 NOTE — TELEPHONE ENCOUNTER
Reason for Call:  Form, our goal is to have forms completed with 72 hours, however, some forms may require a visit or additional information.    Type of letter, form or note:  medical    Who is the form from?: [facility]     Where did the form come from: form was faxed in    What clinic location was the form placed at?: [facility]    Where the form was placed: 's Box: Nicole Siegler, PA    What number is listed as a contact on the form?: Call patient to confirm if she would like to  the form or have it faxed to # [phone]       Additional comments: Certification of Health Care Provider - FMLA    Call taken on 9/8/2017 at 1:24 PM by Ton Warren

## 2018-05-04 ENCOUNTER — TELEPHONE (OUTPATIENT)
Dept: FAMILY MEDICINE | Facility: CLINIC | Age: 48
End: 2018-05-04

## 2018-05-04 NOTE — TELEPHONE ENCOUNTER
Panel Management Review      Patient has the following on her problem list: None      Composite cancer screening  Chart review shows that this patient is due/due soon for the following Pap Smear  Summary:    Patient is due/failing the following:   PAP and PHYSICAL    Action needed:   Patient needs office visit for pap and physical.    Type of outreach:    Sent letter.    Questions for provider review:    None                                                                                                                                    Diamond Prado CMA

## 2018-05-04 NOTE — LETTER
May 4, 2018    Ayesha Pereyra  [address]  [facility] [address]    Dear Rebecca Hodge cares about your health and your health plan.  I have reviewed your medical conditions, medication list and lab results, and am making recommendations based on this review to better manage your health.    You are in particular need of attention regarding:  -Cervical Cancer Screening  -Wellness (Physical) Visit     I am recommending that you:     -schedule a WELLNESS (Physical) APPOINTMENT with me.   I will check fasting labs the same day - nothing to eat except water and meds for 8-10 hours prior.    -schedule a PAP SMEAR EXAM which is due.  Please disregard this reminder if you have had this exam elsewhere within the last year.  It would be helpful for us to have a copy of your recent pap smear report in our file so that we can best coordinate your care.    If you are under/uninsured, we recommend you contact the [facility]. They offer pap smears at no charge or on a sliding fee charge. You can schedule with them at [phone]. Please have them send us the results.      Please call us at the [facility] location:  [phone] or use [facility] to address the above recommendations.     Thank you for trusting [facility].  We appreciate the opportunity to serve you and look forward to supporting your healthcare in the future.    If you have (or plan to have) any of these tests done at a facility other than a [facility] or a [facility], please have the results sent to the [facility] location noted above.      Best Regards,    BLAYNE Maxwell

## 2019-04-26 ENCOUNTER — HOSPITAL ENCOUNTER (EMERGENCY)
Facility: CLINIC | Age: 49
Discharge: HOME | End: 2019-04-26
Attending: PHYSICIAN ASSISTANT | Admitting: PHYSICIAN ASSISTANT
Payer: OTHER MISCELLANEOUS

## 2019-04-26 ENCOUNTER — APPOINTMENT (OUTPATIENT)
Dept: GENERAL RADIOLOGY | Facility: CLINIC | Age: 49
End: 2019-04-26
Attending: PHYSICIAN ASSISTANT
Payer: OTHER MISCELLANEOUS

## 2019-04-26 VITALS
DIASTOLIC BLOOD PRESSURE: 70 MMHG | SYSTOLIC BLOOD PRESSURE: 96 MMHG | TEMPERATURE: 98.6 F | RESPIRATION RATE: 15 BRPM | HEART RATE: 70 BPM | BODY MASS INDEX: 34.22 KG/M2 | OXYGEN SATURATION: 99 % | WEIGHT: 212 LBS

## 2019-04-26 DIAGNOSIS — S89.91XA KNEE INJURY, RIGHT, INITIAL ENCOUNTER: ICD-10-CM

## 2019-04-26 PROCEDURE — 73562 X-RAY EXAM OF KNEE 3: CPT | Mod: RT

## 2019-04-26 PROCEDURE — 99283 EMERGENCY DEPT VISIT LOW MDM: CPT

## 2019-04-26 PROCEDURE — 25000132 ZZH RX MED GY IP 250 OP 250 PS 637: Performed by: PHYSICIAN ASSISTANT

## 2019-04-26 RX ORDER — ACETAMINOPHEN 325 MG/1
650 TABLET ORAL ONCE
Status: COMPLETED | OUTPATIENT
Start: 2019-04-26 | End: 2019-04-26

## 2019-04-26 RX ADMIN — ACETAMINOPHEN 650 MG: 325 TABLET, FILM COATED ORAL at 19:34

## 2019-04-26 ASSESSMENT — ENCOUNTER SYMPTOMS
BACK PAIN: 1
ARTHRALGIAS: 1

## 2019-04-26 NOTE — ED AVS SNAPSHOT
Emergency Department  [address] [place] [address]  Phone:  [phone]  Fax:  [phone]                                    Ayesha Pereyra   MRN: [id number]    Department:   Emergency Department   Date of Visit:  4/26/2019           After Visit Summary Signature Page    I have received my discharge instructions, and my questions have been answered. I have discussed any challenges I see with this plan with the nurse or doctor.    ..........................................................................................................................................  Patient/Patient Representative Signature      ..........................................................................................................................................  Patient Representative Print Name and Relationship to Patient    ..................................................               ................................................  Date                                   Time    ..........................................................................................................................................  Reviewed by Signature/Title    ...................................................              ..............................................  Date                                               Time          22EPIC Rev 08/18

## 2019-04-26 NOTE — LETTER
April 26, 2019      To Whom It May Concern:      Ayesha Pereyra was seen in our Emergency Department today, 04/26/19. She may go back to work on light duty restrictions. No heavy lifting, pushing, or pulling for the next 3 days.    Sincerely,        Rhonda Hartman RN

## 2019-04-27 NOTE — ED NOTES
Patient presents to ED with reports of right knee pain that she has had for 1 month. 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 Patient states that while working her knee got tangled in a blanket and she heard a pop to the back of her right knee. Pt reports it occurred around 4pm today but did not tell her supervisor at work. Pt has limited ROM to right knee. Pt states she is unable to bend it all the way and unable to walk.  Patient also endorses lower back pain that is chronic.

## 2019-04-27 NOTE — ED PROVIDER NOTES
History     Chief Complaint:  Knee Pain       HPI   Ayesha Pereyra is a 48 year old female who presents to the emergency department today for evaluation of knee pain. The patient reports she has had right knee pain that radiates down to the top of the right foot for the past month.  However, today she was working and got tangled up in some blankets and hyperflexed her knee.  This resulted in a popping sensation and excruciating knee pain, prompting her evaluation. She feels as if there is something wrong with a ligament. She states she has been able to ambulate but is very painful. She has been treating her pain with Tylenol and icy-hot patches.  Due to the worsening pain she presented to the emergency department today. Additionally, she also has some pain in her back and right heel, but this pain has been present for many years. She denies fever, vomiting, saddle paresthesias, urinary/fecal incontinence, numbness, tingling, or weakness.       Allergies:  Keflex [Cephalexin]        Medications:    acetaminophen (TYLENOL) 500 MG tablet  carboxymethylcellulose (REFRESH PLUS) 0.5 % SOLN ophthalmic solution  ferrous sulfate (IRON) 325 (65 FE) MG tablet  OMEPRAZOLE PO  oxybutynin (DITROPAN-XL) 10 MG 24 hr tablet  oxyCODONE (ROXICODONE) 5 MG IR tablet      Past Medical History:    Pyelonephritis  Right ureteral stone      Past Surgical History:    Combined cystoscopy insert stent ureters  GYN surgery  Hysterectomy      Family History:    History reviewed. No pertinent family history.        Social History:  The patient was accompanied to the ED by herself.  Smoking Status: Never Smoker  Smokeless Tobacco: Never Used  Alcohol Use: No   Marital Status:   [4]       Review of Systems   Musculoskeletal: Positive for arthralgias (right knee and heel) and back pain.   All other systems reviewed and are negative.    Physical Exam   First Vitals:  BP: 96/70  Pulse: 70  Temp: 98.6  F (37  C)  Resp: 15  Weight:  96.2 kg (212 lb)  SpO2: 99 %    Physical Exam  General: Resting comfortably.  Alert and oriented.   Head:  The scalp, face, and head appear normal   Eyes:  Conjunctivae and sclerae are normal    CV:  Regular rate and rhythm     Normal S1/S2    DP and PT pulses intact to right foot    Capillary refill is brisk in all digits of the right foot.   Resp:  Lungs are clear to auscultation    Non-labored    No rales or wheezing   MS:  Patient can wiggle toes no difficulty and dorsi/plantar flex the ankle without difficulty.  Patient can fully extend the right knee.  Extensor compartment is intact.  Patient has pain with full flexion of the right knee.  Ligaments are clinically stable (exam limited secondary to patient habitus).  Skin:  Mild swelling noted to the right knee.  No ecchymosis or obvious deformity.  No lacerations or abrasions  Neuro: Sensation intact throughout right lower extremity.     Emergency Department Course     Imaging:  Radiology findings were communicated with the patient who voiced understanding of the findings.    Knee XR, 3 views, right:   IMPRESSION: No acute fracture or dislocation. Mild medial compartment  narrowing. Hypertrophic spurring arising from the anterior, superior  Patella reading per radiology.      Interventions:  1934 Tylenol 650 mg PO       Emergency Department Course:  Nursing notes and vitals reviewed.  1915: I performed an exam of the patient as documented above.   The patient was sent for a XR knee while in the emergency department, results above.   2002 Patient rechecked and updated.     Findings and plan explained to the Patient. Patient discharged home with instructions regarding supportive care, medications, and reasons to return. The importance of close follow-up was reviewed.   I personally reviewed the imaging results with the Patient and answered all related questions prior to discharge.   Impression & Plan      Medical Decision Making:  Ayesha Pereyra is a 48 year old  female who presents for elevation of right knee pain. She states this has been ongoing for one month, and was worsened today when she got tangled up in blankets and hyperflexed her knee creating a popping sensation. Xray of the knee was obtained and was negative for fracture, but does demonstrate some findings of osteoarthritis. Ligaments are clinically stable on exam, but again cannot rule out ligament disruption or tear. No sign of occult dislocation, CMS is intact. Patient has good distal pulses, and her extensor mechanism is intact. She is ambulatory here. There are no signs of septic arthritis or infection. No signs of complete ligament disruption or occult dislocation. Patient is safe for discharge home, and was instructed on how to use an ACE wrap. She was instructed to continue taking Tylenol and icing the area. She will be discharged home with instructions to follow up with orthopedics for recheck and possible MRI. A work note was supplied. She was asked to return immediatly for any fevers, redness, increased pain, or any other concerns. All questions were answered prior to discharge, and patient agrees and understands plan.    Diagnosis:    ICD-10-CM    1. Knee injury, right, initial encounter S89.91XA        Disposition:  Discharged to home.     Scribe Disclosure:  I, Jaki Miles, am serving as a scribe at 7:05 PM on 4/26/2019 to document services personally performed by Radha Duke PA* based on my observations and the provider's statements to me.    Jaki Miles  4/26/2019    EMERGENCY DEPARTMENT       Radha Duke PA-C  04/26/19 2121

## 2020-01-12 ENCOUNTER — HOSPITAL ENCOUNTER (EMERGENCY)
Facility: CLINIC | Age: 50
Discharge: HOME | End: 2020-01-12
Attending: NURSE PRACTITIONER | Admitting: NURSE PRACTITIONER
Payer: COMMERCIAL

## 2020-01-12 VITALS
TEMPERATURE: 98.8 F | SYSTOLIC BLOOD PRESSURE: 140 MMHG | RESPIRATION RATE: 18 BRPM | HEART RATE: 68 BPM | OXYGEN SATURATION: 99 % | DIASTOLIC BLOOD PRESSURE: 69 MMHG | WEIGHT: 212 LBS | BODY MASS INDEX: 34.22 KG/M2

## 2020-01-12 DIAGNOSIS — R05.9 COUGH: ICD-10-CM

## 2020-01-12 DIAGNOSIS — J02.9 VIRAL PHARYNGITIS: ICD-10-CM

## 2020-01-12 DIAGNOSIS — J06.9 VIRAL URI: ICD-10-CM

## 2020-01-12 LAB
DEPRECATED S PYO AG THROAT QL EIA: NORMAL
SPECIMEN SOURCE: NORMAL

## 2020-01-12 PROCEDURE — 87081 CULTURE SCREEN ONLY: CPT | Performed by: NURSE PRACTITIONER

## 2020-01-12 PROCEDURE — 99283 EMERGENCY DEPT VISIT LOW MDM: CPT

## 2020-01-12 PROCEDURE — 87880 STREP A ASSAY W/OPTIC: CPT | Performed by: NURSE PRACTITIONER

## 2020-01-12 RX ORDER — BENZONATATE 100 MG/1
100 CAPSULE ORAL 3 TIMES DAILY PRN
Qty: 30 CAPSULE | Refills: 0 | Status: SHIPPED | OUTPATIENT
Start: 2020-01-12

## 2020-01-12 ASSESSMENT — ENCOUNTER SYMPTOMS
DIARRHEA: 0
HEADACHES: 0
RHINORRHEA: 1
FEVER: 0
COUGH: 1
TROUBLE SWALLOWING: 1
SORE THROAT: 1

## 2020-01-12 NOTE — ED AVS SNAPSHOT
Emergency Department  [address] [place] [address]  Phone:  [phone]  Fax:  [phone]                                    Ayesha Pereyra   MRN: [id number]    Department:   Emergency Department   Date of Visit:  1/12/2020           After Visit Summary Signature Page    I have received my discharge instructions, and my questions have been answered. I have discussed any challenges I see with this plan with the nurse or doctor.    ..........................................................................................................................................  Patient/Patient Representative Signature      ..........................................................................................................................................  Patient Representative Print Name and Relationship to Patient    ..................................................               ................................................  Date                                   Time    ..........................................................................................................................................  Reviewed by Signature/Title    ...................................................              ..............................................  Date                                               Time          22EPIC Rev 08/18

## 2020-01-13 NOTE — ED PROVIDER NOTES
History     Chief Complaint:  Pharyngitis    HPI   Ayesha Pereyra is a 49 year old female who presents for the evaluation of pharyngitis. The patient reports that for the past two weeks she has been experiencing a sore throat, prompting her to the ED. The patient notes that she is also experiencing rhinorrhea and cough. She describes that her throat pain is worsened with swallowing. She also notes that she had a few episodes of diarrhea last week, but denies this currently. She states that her child was recently diagnosed with strep throat. The patient denies fever, headache, and other issues.      Allergies:  Keflex      Medications:    Omeprazole  Iron    Past Medical History:    Pyelonephritis  Right ureteral stone   Iron deficiency anemia    Past Surgical History:    Hysterectomy     Family History:    History reviewed. No pertinent family history.      Social History:  Smoking status: Never Smoker  Alcohol use: No  Drug use: No  Marital Status:   [4]     Review of Systems   Constitutional: Negative for fever.   HENT: Positive for rhinorrhea, sore throat and trouble swallowing.    Respiratory: Positive for cough.    Gastrointestinal: Negative for diarrhea.   Neurological: Negative for headaches.   All other systems reviewed and are negative.        Physical Exam     Patient Vitals for the past 24 hrs:   BP Temp Temp src Pulse Heart Rate Resp SpO2 Weight   01/12/20 2123 (!) 140/69 98.8  F (37.1  C) Oral 68 68 18 99 % 96.2 kg (212 lb)      Physical Exam  Physical Exam   Constitutional: Non toxic appearing.   Head: Head moves freely with normal range of motion. Nose with mucosal edema, clear rhinorrhea.   ENT: Oropharynx is clear and moist. No posterior oropharynx erythema, edema or exudate. Tonsillar pilars and folds seen with no fullness. Ear canals and TMs normal.  Eyes: Conjunctivae pink. EOMs intact.  Neck: Normal range of motion. No cervical or supraclavicular lymphadenopathy. No nuchal  rigidity.   Cardiovascular: Regular rate and rhythm. Normal heart sounds. No concerning murmur.  Pulmonary/Chest: No respiratory distress. No use of accessory muscles. Breath sounds normal. No decreased breath sounds. No wheezes. No rhonchi. No rales.   Abdominal: Soft. Non-tender. No rebound, no guarding.  Musculoskeletal: No peripheral edema. Distal capillary refill and sensation intact.   Neurological: Oriented to person, place, and time. No focal deficits.   Skin: Skin is warm. No rash noted.        Emergency Department Course   Laboratory:  Rapid strep antigen: Negative  Beta strep group A culture: Pending    Emergency Department Course:  Past medical records, nursing notes, and vitals reviewed.  2145: I performed an exam of the patient and obtained history, as documented above.     2212: I rechecked the patient. Explained findings to patient.     Findings and plan explained to the Patient. Patient discharged home with instructions regarding supportive care, medications, and reasons to return. The importance of close follow-up was reviewed. The patient was prescribed Tessalon.       Impression & Plan    Medical Decision Making:  Ayesha Pereyra is a 49 year old female who presents for evaluation of a sore throat and clinical evidence of pharyngitis.  The rapid strep test is negative. There is no clinical evidence of peritonsillar abscess, retropharyngeal abscess, epiglottis, or Charles's angina. The etiology is most likely viral.   I have recommended treatment with analgesics and tessalon prn cough. Return if increasing pain, change in voice, neck pain, vomiting, fever, or shortness of breath. Follow-up with primary physician if not improving in 3-5 days. Given well appearance, I would not test further for other etiologies of serious bacterial infections. Patient has a concurrent URI, making viral etiologies more likely.  If sore throat persists, mono testing indicated. She is amenable to plan.      Diagnosis:    ICD-10-CM    1. Viral pharyngitis J02.9    2. Viral URI J06.9    3. Cough R05        Disposition:  Discharged to home with Tessalon.    Discharge Medications:  New Prescriptions    BENZONATATE (TESSALON) 100 MG CAPSULE    Take 1 capsule (100 mg) by mouth 3 times daily as needed for cough     Scribe Disclosure:  IBill, am serving as a scribe at 9:48 PM on 1/12/2020 to document services personally performed by Sharon Evans APRN CNP based on my observations and the provider's statements to me.      Bill Cain  1/12/2020    EMERGENCY DEPARTMENT       Sharon Evans APRN CNP  01/13/20 0035

## 2020-01-15 LAB
BACTERIA SPEC CULT: NORMAL
Lab: NORMAL
SPECIMEN SOURCE: NORMAL

## 2020-01-15 NOTE — RESULT ENCOUNTER NOTE
Final Beta strep group A r/o culture is NEGATIVE for Group A streptococcus.    No treatment or change in treatment per [facility] Strep protocol.

## 2021-12-29 ENCOUNTER — TRANSFERRED RECORDS (OUTPATIENT)
Dept: HEALTH INFORMATION MANAGEMENT | Facility: CLINIC | Age: 51
End: 2021-12-29
Payer: COMMERCIAL

## 2022-02-04 ENCOUNTER — MEDICAL CORRESPONDENCE (OUTPATIENT)
Dept: HEALTH INFORMATION MANAGEMENT | Facility: CLINIC | Age: 52
End: 2022-02-04

## 2022-02-04 ENCOUNTER — MEDICAL CORRESPONDENCE (OUTPATIENT)
Dept: HEALTH INFORMATION MANAGEMENT | Facility: CLINIC | Age: 52
End: 2022-02-04
Payer: COMMERCIAL

## 2022-04-29 ENCOUNTER — TRANSFERRED RECORDS (OUTPATIENT)
Dept: HEALTH INFORMATION MANAGEMENT | Facility: CLINIC | Age: 52
End: 2022-04-29
Payer: COMMERCIAL

## 2022-04-29 LAB
CHOLESTEROL (EXTERNAL): 236 MG/DL
HBA1C MFR BLD: 5.6 %
HDLC SERPL-MCNC: 52 MG/DL
LDL CHOLESTEROL CALCULATED (EXTERNAL): 158 MG/DL
NON HDL CHOLESTEROL (EXTERNAL): 184 MG/DL
TRIGLYCERIDES (EXTERNAL): 136 MG/DL

## 2022-05-12 ENCOUNTER — APPOINTMENT (OUTPATIENT)
Dept: ULTRASOUND IMAGING | Facility: CLINIC | Age: 52
End: 2022-05-12
Attending: EMERGENCY MEDICINE
Payer: MEDICAID

## 2022-05-12 ENCOUNTER — HOSPITAL ENCOUNTER (EMERGENCY)
Facility: CLINIC | Age: 52
Discharge: HOME | End: 2022-05-13
Attending: EMERGENCY MEDICINE | Admitting: EMERGENCY MEDICINE
Payer: MEDICAID

## 2022-05-12 DIAGNOSIS — K29.00 ACUTE GASTRITIS WITHOUT HEMORRHAGE, UNSPECIFIED GASTRITIS TYPE: ICD-10-CM

## 2022-05-12 LAB
ALBUMIN SERPL-MCNC: 3.8 G/DL (ref 3.4–5)
ALBUMIN UR-MCNC: 20 MG/DL
ALP SERPL-CCNC: 71 U/L (ref 40–150)
ALT SERPL W P-5'-P-CCNC: 31 U/L (ref 0–50)
ANION GAP SERPL CALCULATED.3IONS-SCNC: 3 MMOL/L (ref 3–14)
APPEARANCE UR: CLEAR
AST SERPL W P-5'-P-CCNC: 13 U/L (ref 0–45)
BASOPHILS # BLD AUTO: 0.1 10E3/UL (ref 0–0.2)
BASOPHILS NFR BLD AUTO: 1 %
BILIRUB SERPL-MCNC: 0.2 MG/DL (ref 0.2–1.3)
BILIRUB UR QL STRIP: NEGATIVE
BUN SERPL-MCNC: 10 MG/DL (ref 7–30)
CALCIUM SERPL-MCNC: 9.3 MG/DL (ref 8.5–10.1)
CHLORIDE BLD-SCNC: 107 MMOL/L (ref 94–109)
CO2 SERPL-SCNC: 27 MMOL/L (ref 20–32)
COLOR UR AUTO: ABNORMAL
CREAT SERPL-MCNC: 0.62 MG/DL (ref 0.52–1.04)
EOSINOPHIL # BLD AUTO: 0.1 10E3/UL (ref 0–0.7)
EOSINOPHIL NFR BLD AUTO: 2 %
ERYTHROCYTE [DISTWIDTH] IN BLOOD BY AUTOMATED COUNT: 20.5 % (ref 10–15)
GFR SERPL CREATININE-BSD FRML MDRD: >90 ML/MIN/1.73M2
GLUCOSE BLD-MCNC: 106 MG/DL (ref 70–99)
GLUCOSE UR STRIP-MCNC: NEGATIVE MG/DL
HCG SERPL QL: NEGATIVE
HCT VFR BLD AUTO: 34.8 % (ref 35–47)
HGB BLD-MCNC: 10.2 G/DL (ref 11.7–15.7)
HGB UR QL STRIP: ABNORMAL
HOLD SPECIMEN: NORMAL
HOLD SPECIMEN: NORMAL
IMM GRANULOCYTES # BLD: 0 10E3/UL
IMM GRANULOCYTES NFR BLD: 0 %
KETONES UR STRIP-MCNC: NEGATIVE MG/DL
LEUKOCYTE ESTERASE UR QL STRIP: NEGATIVE
LIPASE SERPL-CCNC: 120 U/L (ref 73–393)
LYMPHOCYTES # BLD AUTO: 2.3 10E3/UL (ref 0.8–5.3)
LYMPHOCYTES NFR BLD AUTO: 30 %
MCH RBC QN AUTO: 19.9 PG (ref 26.5–33)
MCHC RBC AUTO-ENTMCNC: 29.3 G/DL (ref 31.5–36.5)
MCV RBC AUTO: 68 FL (ref 78–100)
MONOCYTES # BLD AUTO: 0.5 10E3/UL (ref 0–1.3)
MONOCYTES NFR BLD AUTO: 6 %
MUCOUS THREADS #/AREA URNS LPF: PRESENT /LPF
NEUTROPHILS # BLD AUTO: 4.7 10E3/UL (ref 1.6–8.3)
NEUTROPHILS NFR BLD AUTO: 61 %
NITRATE UR QL: NEGATIVE
NRBC # BLD AUTO: 0 10E3/UL
NRBC BLD AUTO-RTO: 0 /100
PH UR STRIP: 5.5 [PH] (ref 5–7)
PLATELET # BLD AUTO: 215 10E3/UL (ref 150–450)
POTASSIUM BLD-SCNC: 3.9 MMOL/L (ref 3.4–5.3)
PROT SERPL-MCNC: 8 G/DL (ref 6.8–8.8)
RBC # BLD AUTO: 5.13 10E6/UL (ref 3.8–5.2)
RBC URINE: 3 /HPF
SODIUM SERPL-SCNC: 137 MMOL/L (ref 133–144)
SP GR UR STRIP: 1.01 (ref 1–1.03)
SQUAMOUS EPITHELIAL: 1 /HPF
UROBILINOGEN UR STRIP-MCNC: NORMAL MG/DL
WBC # BLD AUTO: 7.7 10E3/UL (ref 4–11)
WBC URINE: 3 /HPF

## 2022-05-12 PROCEDURE — 85025 COMPLETE CBC W/AUTO DIFF WBC: CPT | Performed by: EMERGENCY MEDICINE

## 2022-05-12 PROCEDURE — 80053 COMPREHEN METABOLIC PANEL: CPT | Performed by: EMERGENCY MEDICINE

## 2022-05-12 PROCEDURE — 99284 EMERGENCY DEPT VISIT MOD MDM: CPT | Mod: 25

## 2022-05-12 PROCEDURE — 83690 ASSAY OF LIPASE: CPT | Performed by: EMERGENCY MEDICINE

## 2022-05-12 PROCEDURE — 81001 URINALYSIS AUTO W/SCOPE: CPT | Performed by: EMERGENCY MEDICINE

## 2022-05-12 PROCEDURE — 36415 COLL VENOUS BLD VENIPUNCTURE: CPT | Performed by: EMERGENCY MEDICINE

## 2022-05-12 PROCEDURE — 96374 THER/PROPH/DIAG INJ IV PUSH: CPT

## 2022-05-12 PROCEDURE — 84703 CHORIONIC GONADOTROPIN ASSAY: CPT | Performed by: EMERGENCY MEDICINE

## 2022-05-12 PROCEDURE — 250N000011 HC RX IP 250 OP 636: Performed by: EMERGENCY MEDICINE

## 2022-05-12 PROCEDURE — 76705 ECHO EXAM OF ABDOMEN: CPT

## 2022-05-12 RX ORDER — KETOROLAC TROMETHAMINE 15 MG/ML
15 INJECTION, SOLUTION INTRAMUSCULAR; INTRAVENOUS ONCE
Status: COMPLETED | OUTPATIENT
Start: 2022-05-12 | End: 2022-05-12

## 2022-05-12 RX ADMIN — KETOROLAC TROMETHAMINE 15 MG: 15 INJECTION, SOLUTION INTRAMUSCULAR; INTRAVENOUS at 23:14

## 2022-05-12 NOTE — ED TRIAGE NOTES
Pt reports RUQ pain that is 9/10 after eating that is worsening and radiates into back. Pt is taking omeprazole without relief. Pt states pain is worse after eating and has poor appetite.      Triage Assessment     Row Name 05/12/22 6210       Triage Assessment (Adult)    Airway WDL WDL       Respiratory WDL    Respiratory WDL WDL       Skin Circulation/Temperature WDL    Skin Circulation/Temperature WDL WDL       Cardiac WDL    Cardiac WDL WDL       Peripheral/Neurovascular WDL    Peripheral Neurovascular WDL WDL       Cognitive/Neuro/Behavioral WDL    Cognitive/Neuro/Behavioral WDL WDL

## 2022-05-13 VITALS
OXYGEN SATURATION: 97 % | RESPIRATION RATE: 18 BRPM | HEIGHT: 64 IN | WEIGHT: 200 LBS | DIASTOLIC BLOOD PRESSURE: 63 MMHG | BODY MASS INDEX: 34.15 KG/M2 | SYSTOLIC BLOOD PRESSURE: 128 MMHG | TEMPERATURE: 98.5 F | HEART RATE: 49 BPM

## 2022-05-13 NOTE — ED PROVIDER NOTES
"  History     Chief Complaint:  Abdominal Pain       HPI   Ayesha Pereyra is a 51 year old female who presents with right upper quadrant abdominal pain been going on for about a week.  Comes and goes worse with eating fatty foods and heavier proteins.  Reports she has been diagnosed with gastritis in the past and is not certain if that could be contributing as well.  No vomiting, no fevers no stool changes.  No prior abdominal surgery.  Review of systems otherwise negative.    ROS:  Review of Systems   All other systems reviewed and are negative.         Allergies:  Keflex [Cephalexin]     Medications:    acetaminophen (TYLENOL) 500 MG tablet  benzonatate (TESSALON) 100 MG capsule  carboxymethylcellulose (REFRESH PLUS) 0.5 % SOLN ophthalmic solution  ferrous sulfate (IRON) 325 (65 FE) MG tablet  OMEPRAZOLE PO  oxybutynin (DITROPAN-XL) 10 MG 24 hr tablet  oxyCODONE (ROXICODONE) 5 MG IR tablet        Past Medical History:    History reviewed. No pertinent past medical history.    Past Surgical History:    Past Surgical History:   Procedure Laterality Date     COMBINED CYSTOSCOPY, INSERT STENT URETER(S) Right 8/25/2017    Procedure: COMBINED CYSTOSCOPY, INSERT STENT URETER(S);  Cystoscopy,  Right  Ureteral Stent Placement;  Surgeon: Ousmane Grady MD;  Location: [facility] OR     GYN SURGERY       HYSTERECTOMY  1995        Family History:    family history is not on file.    Social History:   reports that she has never smoked. She has never used smokeless tobacco. She reports that she does not drink alcohol and does not use drugs.  PCP: [facility]     Physical Exam     Patient Vitals for the past 24 hrs:   BP Temp Temp src Pulse Resp SpO2 Height Weight   05/12/22 2320 (!) 153/66 -- -- 51 -- 97 % -- --   05/12/22 1734 120/46 98.5  F (36.9  C) Temporal 62 18 97 % 1.626 m (5' 4\") 90.7 kg (200 lb)        Physical Exam  Gen: well appearing, in no acute distress, appears with elevated BMI  Oral : " Mucous membranes moist,   Nose: No rhinorhea  Ears: External near normal, without drainage  Eyes: periorbital tissues and sclera normal   Neck: supple, no abnormal swelling  Lungs:    no resp distress, speaks full sentences  CV: Regular rate, regular rhythm  Abd: Soft, mild right upper quadrant tenderness no guarding or rebound  Ext: no lower extremity edema  Skin: warm, dry, well perfused, no rashes/bruising/lesions on exposed skin  Neuro: alert, no gross motor or sensory deficits,   Psych: pleasant mood, normal affect      Emergency Department Course   ECG:  ECG results from 08/25/17   EKG 12 lead     Value    Interpretation ECG Click View Image link to view waveform and result       Imaging:  Abdomen US, limited (RUQ only)   Final Result   IMPRESSION:   1.  Hepatic steatosis.               Report per radiology    Laboratory:  Labs Ordered and Resulted from Time of ED Arrival to Time of ED Departure   COMPREHENSIVE METABOLIC PANEL - Abnormal       Result Value    Sodium 137      Potassium 3.9      Chloride 107      Carbon Dioxide (CO2) 27      Anion Gap 3      Urea Nitrogen 10      Creatinine 0.62      Calcium 9.3      Glucose 106 (*)     Alkaline Phosphatase 71      AST 13      ALT 31      Protein Total 8.0      Albumin 3.8      Bilirubin Total 0.2      GFR Estimate >90     CBC WITH PLATELETS AND DIFFERENTIAL - Abnormal    WBC Count 7.7      RBC Count 5.13      Hemoglobin 10.2 (*)     Hematocrit 34.8 (*)     MCV 68 (*)     MCH 19.9 (*)     MCHC 29.3 (*)     RDW 20.5 (*)     Platelet Count 215      % Neutrophils 61      % Lymphocytes 30      % Monocytes 6      % Eosinophils 2      % Basophils 1      % Immature Granulocytes 0      NRBCs per 100 WBC 0      Absolute Neutrophils 4.7      Absolute Lymphocytes 2.3      Absolute Monocytes 0.5      Absolute Eosinophils 0.1      Absolute Basophils 0.1      Absolute Immature Granulocytes 0.0      Absolute NRBCs 0.0     ROUTINE UA WITH MICROSCOPIC REFLEX TO CULTURE -  Abnormal    Color Urine Light Yellow      Appearance Urine Clear      Glucose Urine Negative      Bilirubin Urine Negative      Ketones Urine Negative      Specific Gravity Urine 1.012      Blood Urine Trace (*)     pH Urine 5.5      Protein Albumin Urine 20  (*)     Urobilinogen Urine Normal      Nitrite Urine Negative      Leukocyte Esterase Urine Negative      Mucus Urine Present (*)     RBC Urine 3 (*)     WBC Urine 3      Squamous Epithelials Urine 1     LIPASE - Normal    Lipase 120     HCG QUALITATIVE PREGNANCY - Normal    hCG Serum Qualitative Negative          Procedures       Emergency Department Course:             Reviewed:  I reviewed nursing notes    Assessments:      Consults:       Interventions:  Medications   ketorolac (TORADOL) injection 15 mg (15 mg Intravenous Given 5/12/22 2546)        Disposition:  The patient was discharged to home.     Impression & Plan    CMS Diagnoses:   and None      Medical Decision Making:  Patient presents emergency department right upper quadrant abdominal pain.  Worse when she eats fattier foods.  Her vital signs are normal as is her exam.  Not consistent with an acute surgical abdomen.  Labs within normal limits, ultrasound shows no biliary disease.  Suspect gastritis.  Patient is taking 20 mg of omeprazole daily.  Will recommend she goes to twice daily dosing.  Will recommend a bland diet for the next 2 to 3 days and I would anticipate her symptoms should improve.  She feels comfortable with management plan.   used for the visit today as patient is Trin[place] speaking predominantly        Diagnosis:    ICD-10-CM    1. Acute gastritis without hemorrhage, unspecified gastritis type  K29.00         Discharge Medications:  New Prescriptions    No medications on file        5/12/2022   Josh Drummond,*        Josh Drummond MD  05/13/22 0003

## 2022-05-18 ENCOUNTER — TRANSCRIBE ORDERS (OUTPATIENT)
Dept: OTHER | Age: 52
End: 2022-05-18

## 2022-05-18 DIAGNOSIS — K21.9 CHRONIC GERD: Primary | ICD-10-CM

## 2022-08-30 ENCOUNTER — TRANSCRIBE ORDERS (OUTPATIENT)
Dept: GASTROENTEROLOGY | Facility: CLINIC | Age: 52
End: 2022-08-30

## 2022-08-30 DIAGNOSIS — K21.9 GASTROESOPHAGEAL REFLUX DISEASE WITHOUT ESOPHAGITIS: Primary | ICD-10-CM

## 2022-09-01 ENCOUNTER — TELEPHONE (OUTPATIENT)
Dept: GASTROENTEROLOGY | Facility: CLINIC | Age: 52
End: 2022-09-01

## 2022-09-01 ENCOUNTER — TRANSCRIBE ORDERS (OUTPATIENT)
Dept: OTHER | Age: 52
End: 2022-09-01

## 2022-09-01 DIAGNOSIS — K21.9 GASTROESOPHAGEAL REFLUX DISEASE, UNSPECIFIED WHETHER ESOPHAGITIS PRESENT: Primary | ICD-10-CM

## 2022-09-01 NOTE — TELEPHONE ENCOUNTER
Screening Questions    BlueKIND OF PREP RedLOCATION [review exclusion criteria] GreenSEDATION TYPE      1. Are you active on mychart? N - USPS MAIL      2. What insurance is in the chart? [employer]/ [place]      3.   Ordering/Referring Provider: Maria C Berry PA-C    4. BMI   (If greater than 40 review exclusion criteria [PAC APPT IF [MAC] @ U)  34.3  [If yes, BMI OVER 40-EXTENDED PREP]      **(Sedation review/consideration needed)**  Do you have a legal guardian or Medical Power of    and/or are you able to give consent for your medical care?     SELF     5. Have you had a positive covid test in the last 90 days?   N     6.  Are you currently on dialysis?   N [ If yes, G-PREP & HOSPITAL setting ONLY]     7.  Do you have chronic kidney disease?  N [ If yes, G-PREP ]    8.   Do you have a diagnosis of diabetes?   Y - PREDIABETIC    [ If yes, G-PREP ]    9.  On a regular basis do you go 3-5 days between bowel movements?   N   [ If yes, EXTENDED PREP]    10.  Are you taking any prescription pain medications on a routine schedule?    N  [ If yes, EXTENDED PREP] [If yes, MAC]      11.   Do you have any chemical dependencies such as alcohol, street drugs, or methadone?    N [If yes, MAC]    12.   Do you have any history of post-traumatic stress syndrome, severe anxiety or history of psychosis?    Y - ANXIETY   [If yes, MAC]    13.  [FEMALES] Are you currently pregnant?     If yes, how many weeks?       Respiratory/Heart Screening:  [If yes to any of the following HOSPITAL setting only]     14. Do you have Pulmonary Hypertension [Lungs]?   N       15. Do you have UNCONTROLLED asthma?   N     16.  Do you use daily home oxygen?  N      17. Do you have mod to severe Obstructive Sleep Apnea?         (OKAY @  [facility]  [facility]  [facility]  [facility]  [facility] - if pt is not on OXYGEN)  N      18.   Have you had a heart or lung transplant?   N      19.   Have you had a stroke or Transient ischemic attack (TIA - aka  mini stroke ) within 6  months?  (If yes, please review exclusion criteria)  N     20.   In the past 6 months, have you had any heart related issues including cardiomyopathy or heart attack?   N           If yes, did it require cardiac stenting or other implantable device?   N      21.   Do you have any implantable devices in your body (pacemaker, defib, LVAD)? (If yes, please review exclusion criteria)  N   22.  Do you take the medication Phentermine?  NO        23. Do you take nitroglycerin?   N           If yes, how often? N  (if yes, HOSPITAL setting ONLY)    24.  Are you currently taking any blood thinners?    [If yes, INFORM patient to follw up w/ ORDERING PROVIDER FOR BRIDGING INSTRUCTIONS]     N    25.   Do you transfer independently?                (If NO, please HOSPITAL setting ONLY)  Y     26.   Preferred LOCAL Pharmacy for Pre Prescription:         [id number] [place], [place] - [address]    Scheduling Details  (Please ask for phone number if not scheduled by patient)      Caller : Ayesha Pereyra   Date of Procedure: 10/12/2022  Surgeon: CHEKO   Location: [facility]         Sedation Type: MAC  l ANXIETY   Conscious Sedation- Needs  for 6 hours after the procedure  MAC/General-Needs  for 24 hours after procedure    Y - PT WILL SCHEDULE PRE OP WITH PCP  :[Pre-op Required] at [facility] and OR for MAC sedation   (advise patient they will need a pre-op WITH IN 30 DAYS of procedure date)     Type of Procedure Scheduled:   Upper Endoscopy [EGD]    Which Colonoscopy Prep was Sent?:   - - -    [facility]OR[facility]S CF PATIENTS & GROEN'S PATIENTS NEEDS EXTENDED PREP       Informed patient they will need an adult  Y  Cannot take any type of public or medical transportation alone    Pre-Procedure Covid test to be completed at [facility] or Externally: Y  **INFORMED OF HOME TESTING & LAB OPTION**        Confirmed Nurse will call to complete assessment Y    Additional comments: N

## 2022-09-21 ENCOUNTER — TRANSFERRED RECORDS (OUTPATIENT)
Dept: HEALTH INFORMATION MANAGEMENT | Facility: CLINIC | Age: 52
End: 2022-09-21

## 2022-09-21 LAB
CREATININE (EXTERNAL): 0.54 MG/DL (ref 0.5–1.03)
GFR ESTIMATED (EXTERNAL): 109 ML/MIN/1.73M2
GLUCOSE (EXTERNAL): 124 MG/DL (ref 65–99)
POTASSIUM (EXTERNAL): 3.9 MMOL/L (ref 3.5–5.3)

## 2022-10-03 ENCOUNTER — TELEPHONE (OUTPATIENT)
Dept: GASTROENTEROLOGY | Facility: CLINIC | Age: 52
End: 2022-10-03

## 2022-10-03 DIAGNOSIS — Z11.59 ENCOUNTER FOR SCREENING FOR OTHER VIRAL DISEASES: Primary | ICD-10-CM

## 2022-10-03 NOTE — TELEPHONE ENCOUNTER
Pre assessment questions completed for upcoming EGD procedure scheduled on 10/12/22 with assistance of  id [id number].    COVID test 10/10/22 - order placed.    Pre-op scheduled 9/29/12 - [facility]. Per patient they were to fax report.     Reviewed procedural arrival time 1045 and facility location .     Designated  policy reviewed. Instructed to have someone stay 24 hours post procedure.     Procedure indication: gerd    Reviewed EGD prep instructions. NPO six hours prior to procedure.     Patient states that they received prep instructions via letter but it was in English and could not translate.     Anticoagulation/blood thinners? no    Patient verbalized understanding and had no questions or concerns at this time.    July Suh RN

## 2022-10-10 ENCOUNTER — LAB (OUTPATIENT)
Dept: URGENT CARE | Facility: URGENT CARE | Age: 52
End: 2022-10-10
Payer: COMMERCIAL

## 2022-10-10 DIAGNOSIS — Z11.59 ENCOUNTER FOR SCREENING FOR OTHER VIRAL DISEASES: ICD-10-CM

## 2022-10-10 PROCEDURE — U0003 INFECTIOUS AGENT DETECTION BY NUCLEIC ACID (DNA OR RNA); SEVERE ACUTE RESPIRATORY SYNDROME CORONAVIRUS 2 (SARS-COV-2) (CORONAVIRUS DISEASE [COVID-19]), AMPLIFIED PROBE TECHNIQUE, MAKING USE OF HIGH THROUGHPUT TECHNOLOGIES AS DESCRIBED BY CMS-2020-01-R: HCPCS

## 2022-10-10 PROCEDURE — U0005 INFEC AGEN DETEC AMPLI PROBE: HCPCS

## 2022-10-11 ENCOUNTER — ANESTHESIA EVENT (OUTPATIENT)
Dept: GASTROENTEROLOGY | Facility: CLINIC | Age: 52
End: 2022-10-11
Payer: COMMERCIAL

## 2022-10-11 LAB — SARS-COV-2 RNA RESP QL NAA+PROBE: NEGATIVE

## 2022-10-12 ENCOUNTER — HOSPITAL ENCOUNTER (OUTPATIENT)
Facility: CLINIC | Age: 52
Discharge: HOME | End: 2022-10-12
Attending: INTERNAL MEDICINE | Admitting: INTERNAL MEDICINE
Payer: COMMERCIAL

## 2022-10-12 ENCOUNTER — ANESTHESIA (OUTPATIENT)
Dept: GASTROENTEROLOGY | Facility: CLINIC | Age: 52
End: 2022-10-12
Payer: COMMERCIAL

## 2022-10-12 VITALS
RESPIRATION RATE: 13 BRPM | SYSTOLIC BLOOD PRESSURE: 133 MMHG | HEART RATE: 40 BPM | DIASTOLIC BLOOD PRESSURE: 70 MMHG | WEIGHT: 200 LBS | BODY MASS INDEX: 34.15 KG/M2 | HEIGHT: 64 IN | OXYGEN SATURATION: 100 %

## 2022-10-12 LAB — UPPER GI ENDOSCOPY: NORMAL

## 2022-10-12 PROCEDURE — 250N000011 HC RX IP 250 OP 636: Performed by: NURSE ANESTHETIST, CERTIFIED REGISTERED

## 2022-10-12 PROCEDURE — 88305 TISSUE EXAM BY PATHOLOGIST: CPT | Mod: TC | Performed by: INTERNAL MEDICINE

## 2022-10-12 PROCEDURE — 88342 IMHCHEM/IMCYTCHM 1ST ANTB: CPT | Mod: 26 | Performed by: PATHOLOGY

## 2022-10-12 PROCEDURE — 370N000017 HC ANESTHESIA TECHNICAL FEE, PER MIN: Performed by: INTERNAL MEDICINE

## 2022-10-12 PROCEDURE — 43239 EGD BIOPSY SINGLE/MULTIPLE: CPT | Mod: XS | Performed by: INTERNAL MEDICINE

## 2022-10-12 PROCEDURE — 250N000009 HC RX 250: Performed by: NURSE ANESTHETIST, CERTIFIED REGISTERED

## 2022-10-12 PROCEDURE — 88305 TISSUE EXAM BY PATHOLOGIST: CPT | Mod: 26 | Performed by: PATHOLOGY

## 2022-10-12 PROCEDURE — 999N000010 HC STATISTIC ANES STAT CODE-CRNA PER MINUTE: Performed by: INTERNAL MEDICINE

## 2022-10-12 PROCEDURE — 43251 EGD REMOVE LESION SNARE: CPT | Performed by: INTERNAL MEDICINE

## 2022-10-12 PROCEDURE — 258N000003 HC RX IP 258 OP 636: Performed by: NURSE ANESTHETIST, CERTIFIED REGISTERED

## 2022-10-12 RX ORDER — SODIUM CHLORIDE, SODIUM LACTATE, POTASSIUM CHLORIDE, CALCIUM CHLORIDE 600; 310; 30; 20 MG/100ML; MG/100ML; MG/100ML; MG/100ML
INJECTION, SOLUTION INTRAVENOUS CONTINUOUS PRN
Status: DISCONTINUED | OUTPATIENT
Start: 2022-10-12 | End: 2022-10-12

## 2022-10-12 RX ORDER — ONDANSETRON 4 MG/1
4 TABLET, ORALLY DISINTEGRATING ORAL EVERY 30 MIN PRN
Status: CANCELLED | OUTPATIENT
Start: 2022-10-12

## 2022-10-12 RX ORDER — LIDOCAINE HYDROCHLORIDE 20 MG/ML
INJECTION, SOLUTION INFILTRATION; PERINEURAL PRN
Status: DISCONTINUED | OUTPATIENT
Start: 2022-10-12 | End: 2022-10-12

## 2022-10-12 RX ORDER — ONDANSETRON 2 MG/ML
4 INJECTION INTRAMUSCULAR; INTRAVENOUS EVERY 30 MIN PRN
Status: CANCELLED | OUTPATIENT
Start: 2022-10-12

## 2022-10-12 RX ORDER — ONDANSETRON 2 MG/ML
INJECTION INTRAMUSCULAR; INTRAVENOUS PRN
Status: DISCONTINUED | OUTPATIENT
Start: 2022-10-12 | End: 2022-10-12

## 2022-10-12 RX ORDER — PROPOFOL 10 MG/ML
INJECTION, EMULSION INTRAVENOUS CONTINUOUS PRN
Status: DISCONTINUED | OUTPATIENT
Start: 2022-10-12 | End: 2022-10-12

## 2022-10-12 RX ORDER — SODIUM CHLORIDE, SODIUM LACTATE, POTASSIUM CHLORIDE, CALCIUM CHLORIDE 600; 310; 30; 20 MG/100ML; MG/100ML; MG/100ML; MG/100ML
INJECTION, SOLUTION INTRAVENOUS CONTINUOUS
Status: CANCELLED | OUTPATIENT
Start: 2022-10-12

## 2022-10-12 RX ADMIN — SODIUM CHLORIDE, POTASSIUM CHLORIDE, SODIUM LACTATE AND CALCIUM CHLORIDE: 600; 310; 30; 20 INJECTION, SOLUTION INTRAVENOUS at 12:11

## 2022-10-12 RX ADMIN — PROPOFOL 150 MCG/KG/MIN: 10 INJECTION, EMULSION INTRAVENOUS at 12:12

## 2022-10-12 RX ADMIN — LIDOCAINE HYDROCHLORIDE 50 MG: 20 INJECTION, SOLUTION INFILTRATION; PERINEURAL at 12:11

## 2022-10-12 RX ADMIN — ONDANSETRON 4 MG: 2 INJECTION INTRAMUSCULAR; INTRAVENOUS at 12:16

## 2022-10-12 RX ADMIN — PROPOFOL 50 MG: 10 INJECTION, EMULSION INTRAVENOUS at 12:17

## 2022-10-12 ASSESSMENT — ENCOUNTER SYMPTOMS
DYSRHYTHMIAS: 0
SEIZURES: 0

## 2022-10-12 ASSESSMENT — ACTIVITIES OF DAILY LIVING (ADL)
ADLS_ACUITY_SCORE: 35
ADLS_ACUITY_SCORE: 35

## 2022-10-12 ASSESSMENT — LIFESTYLE VARIABLES: TOBACCO_USE: 0

## 2022-10-12 NOTE — ANESTHESIA CARE TRANSFER NOTE
Patient: Ayesha Pereyra    Procedure: Procedure(s):  ESOPHAGOGASTRODUODENOSCOPY (EGD)       Diagnosis: Gastroesophageal reflux disease, unspecified whether esophagitis present [K21.9]  Diagnosis Additional Information: No value filed.    Anesthesia Type:   MAC     Note:    Oropharynx: oropharynx clear of all foreign objects and spontaneously breathing  Level of Consciousness: drowsy  Oxygen Supplementation: nasal cannula  Level of Supplemental Oxygen (L/min / FiO2): 4  Independent Airway: airway patency satisfactory and stable  Dentition: dentition unchanged  Vital Signs Stable: post-procedure vital signs reviewed and stable  Report to RN Given: handoff report given  Patient transferred to: Phase II    Handoff Report: Identifed the Patient, Identified the Reponsible Provider, Reviewed the pertinent medical history, Discussed the surgical course, Reviewed Intra-OP anesthesia mangement and issues during anesthesia, Set expectations for post-procedure period and Allowed opportunity for questions and acknowledgement of understanding      Vitals:  Vitals Value Taken Time   BP     Temp     Pulse     Resp     SpO2         Electronically Signed By: LIU Crenshaw CRNA  October 12, 2022  12:33 PM

## 2022-10-12 NOTE — ANESTHESIA POSTPROCEDURE EVALUATION
Patient: Ayesha Pereyra    Procedure: Procedure(s):  ESOPHAGOGASTRODUODENOSCOPY, WITH BIOPSY  ESOPHAGOGASTRODUODENOSCOPY, WITH LESION REMOVAL USING SNARE       Anesthesia Type:  MAC    Note:  Disposition: Outpatient   Postop Pain Control: Uneventful            Sign Out: Well controlled pain   PONV: No   Neuro/Psych: Uneventful            Sign Out: Acceptable/Baseline neuro status   Airway/Respiratory: Uneventful            Sign Out: Acceptable/Baseline resp. status   CV/Hemodynamics: Uneventful            Sign Out: Acceptable CV status   Other NRE: NONE   DID A NON-ROUTINE EVENT OCCUR? No           Last vitals:  Vitals Value Taken Time   /70 10/12/22 1250   Temp     Pulse 36 10/12/22 1255   Resp 20 10/12/22 1259   SpO2 100 % 10/12/22 1300   Vitals shown include unvalidated device data.    Electronically Signed By: Nura Banerjee MD  October 12, 2022  1:44 PM

## 2022-10-12 NOTE — ANESTHESIA PREPROCEDURE EVALUATION
Anesthesia Pre-Procedure Evaluation    Patient: Ayesha Pereyra   MRN: [id number] : 1970        Procedure : Procedure(s):  ESOPHAGOGASTRODUODENOSCOPY (EGD)          History reviewed. No pertinent past medical history.   Past Surgical History:   Procedure Laterality Date     COMBINED CYSTOSCOPY, INSERT STENT URETER(S) Right 2017    Procedure: COMBINED CYSTOSCOPY, INSERT STENT URETER(S);  Cystoscopy,  Right  Ureteral Stent Placement;  Surgeon: Ousmane Grady MD;  Location: [facility] OR     GYN SURGERY       HYSTERECTOMY        Allergies   Allergen Reactions     Keflex [Cephalexin]       Social History     Tobacco Use     Smoking status: Never     Smokeless tobacco: Never   Substance Use Topics     Alcohol use: No      Wt Readings from Last 1 Encounters:   10/12/22 90.7 kg (200 lb)        Anesthesia Evaluation   Pt has had prior anesthetic. Type: General.    No history of anesthetic complications       ROS/MED HX  ENT/Pulmonary:    (-) tobacco use, asthma and sleep apnea   Neurologic:     (+) no peripheral neuropathy migraines,  (-) no seizures and no CVA   Cardiovascular:    (-) hypertension, CAD and arrhythmias   METS/Exercise Tolerance:     Hematologic:     (+) anemia,     Musculoskeletal:       GI/Hepatic:     (+) GERD,     Renal/Genitourinary:    (-) renal disease   Endo:     (+) Obesity,  (-) Type II DM and thyroid disease   Psychiatric/Substance Use:     (+) psychiatric history depression     Infectious Disease:    (-) Recent Fever   Malignancy:       Other:            Physical Exam    Airway  airway exam normal      Mallampati: II   TM distance: > 3 FB   Neck ROM: full   Mouth opening: > 3 cm    Respiratory Devices and Support         Dental  no notable dental history         Cardiovascular   cardiovascular exam normal          Pulmonary   pulmonary exam normal                OUTSIDE LABS:  CBC:   Lab Results   Component Value Date    WBC 7.7 2022    WBC 8.4 2017    HGB 10.2 (L)  05/12/2022    HGB 9.6 (L) 08/28/2017    HCT 34.8 (L) 05/12/2022    HCT 30.1 (L) 08/28/2017     05/12/2022     08/28/2017     BMP:   Lab Results   Component Value Date     05/12/2022     08/27/2017    POTASSIUM 3.9 05/12/2022    POTASSIUM 3.4 08/28/2017    CHLORIDE 107 05/12/2022    CHLORIDE 109 08/27/2017    CO2 27 05/12/2022    CO2 22 08/27/2017    BUN 10 05/12/2022    BUN 5 (L) 08/27/2017    CR 0.62 05/12/2022    CR 0.61 08/27/2017     (H) 05/12/2022     (H) 08/27/2017     COAGS: No results found for: PTT, INR, FIBR  POC:   Lab Results   Component Value Date     (H) 08/26/2017    HCG Negative 04/11/2012    HCGS Negative 05/12/2022     HEPATIC:   Lab Results   Component Value Date    ALBUMIN 3.8 05/12/2022    PROTTOTAL 8.0 05/12/2022    ALT 31 05/12/2022    AST 13 05/12/2022    ALKPHOS 71 05/12/2022    BILITOTAL 0.2 05/12/2022     OTHER:   Lab Results   Component Value Date    LACT 0.8 08/26/2017    AMOL 9.3 05/12/2022    LIPASE 120 05/12/2022       Anesthesia Plan    ASA Status:  2   NPO Status:  NPO Appropriate    Anesthesia Type: MAC.              Consents    Anesthesia Plan(s) and associated risks, benefits, and realistic alternatives discussed. Questions answered and patient/representative(s) expressed understanding.    - Discussed:     - Discussed with:  Patient,       - Specific Concerns: Specific risks discussed (but not limited to): Possibility of intraoperative awareness..        Postoperative Care    Pain management: IV analgesics, Oral pain medications.   PONV prophylaxis: Ondansetron (or other 5HT-3), Dexamethasone or Solumedrol     Comments:           H&P reviewed: Unable to attach H&P to encounter due to EHR limitations. H&P Update: appropriate H&P reviewed, patient examined. No interval changes since H&P (within 30 days).         Nura Banerjee MD

## 2022-10-14 LAB
PATH REPORT.COMMENTS IMP SPEC: NORMAL
PATH REPORT.COMMENTS IMP SPEC: NORMAL
PATH REPORT.FINAL DX SPEC: NORMAL
PATH REPORT.GROSS SPEC: NORMAL
PATH REPORT.MICROSCOPIC SPEC OTHER STN: NORMAL
PATH REPORT.MICROSCOPIC SPEC OTHER STN: NORMAL
PATH REPORT.RELEVANT HX SPEC: NORMAL
PHOTO IMAGE: NORMAL

## 2023-05-30 ENCOUNTER — OFFICE VISIT (OUTPATIENT)
Dept: VASCULAR SURGERY | Facility: CLINIC | Age: 53
End: 2023-05-30
Payer: COMMERCIAL

## 2023-05-30 DIAGNOSIS — I83.813 VARICOSE VEINS OF BILATERAL LOWER EXTREMITIES WITH PAIN: Primary | ICD-10-CM

## 2023-05-30 PROCEDURE — 99203 OFFICE O/P NEW LOW 30 MIN: CPT | Performed by: SURGERY

## 2023-05-30 RX ORDER — NAPROXEN 500 MG/1
500 TABLET ORAL
COMMUNITY
Start: 2023-04-05

## 2023-05-30 NOTE — LETTER
5/30/2023         RE: Ayesha Pereyra  [address]  [facility] [address]        Dear Colleague,    Thank you for referring your patient, Ayesha Pereyra, to the [facility] [place]. Please see a copy of my visit note below.    VEINSOLUTIONS CONSULTATION    HPI:    Ayesha Pereyra is a pleasant, self-referred 52 year old female who presents with complaints of right lower extremity pain and varicose veins.  Varicose veins have been present for 2-1/2 years and have become more painful.  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  She has not worn compression hose to any significant extent.    She has a history of deep antibiosis, superficial thrombophlebitis or hemorrhage.    The symptoms interfere with actives of daily living because it makes it impossible for her to stand for long peers of time at work, having to stop and elevate her legs to relieve the pain.  This makes it very difficult for her to do in her work.    Family history significant for varicose veins in her mother.  There is no family history of venous thromboembolism.    PAST MEDICAL HISTORY: No past medical history on file.    PAST SURGICAL HISTORY:   Past Surgical History:   Procedure Laterality Date     COMBINED CYSTOSCOPY, INSERT STENT URETER(S) Right 8/25/2017    Procedure: COMBINED CYSTOSCOPY, INSERT STENT URETER(S);  Cystoscopy,  Right  Ureteral Stent Placement;  Surgeon: Ousmane Grady MD;  Location:  OR     ESOPHAGOSCOPY, GASTROSCOPY, DUODENOSCOPY (EGD), COMBINED N/A 10/12/2022    Procedure: ESOPHAGOGASTRODUODENOSCOPY, WITH BIOPSY;  Surgeon: Erik Dee MD;  Location:  GI     GYN SURGERY       HYSTERECTOMY  1995        FAMILY HISTORY:   Family History   Problem Relation Age of Onset     Ovarian Cancer Mother      Uterine Cancer Mother        SOCIAL HISTORY:   Social History     Tobacco Use     Smoking status: Never     Smokeless tobacco: Never   Vaping Use     Vaping status: Not on file   Substance Use Topics     Alcohol use: No       REVIEW OF SYSTEMS: Review Of Systems  Skin: negative  Eyes: negative  Ears/Nose/Throat: negative  Respiratory: No shortness of breath, dyspnea on exertion, cough, or hemoptysis  Cardiovascular: negative  Gastrointestinal: negative  Genitourinary: negative  Musculoskeletal: Leg pain  Neurologic: negative  Psychiatric: negative  Hematologic/Lymphatic/Immunologic: negative  Endocrine: negative      Vital signs:  There were no vitals taken for this visit.    Current Outpatient Medications   Medication Sig Dispense Refill     naproxen (NAPROSYN) 500 MG tablet Take 500 mg by mouth       OMEPRAZOLE PO Take 20 mg by mouth every morning       acetaminophen (TYLENOL) 500 MG tablet Take 1-2 tablets by mouth every 6 hours as needed for pain. (Patient not taking: Reported on 5/30/2023) 1 tablet 0     benzonatate (TESSALON) 100 MG capsule Take 1 capsule (100 mg) by mouth 3 times daily as needed for cough (Patient not taking: Reported on 5/30/2023) 30 capsule 0     carboxymethylcellulose (REFRESH PLUS) 0.5 % SOLN ophthalmic solution as needed for dry eyes (Patient not taking: Reported on 5/30/2023)       ferrous sulfate (IRON) 325 (65 FE) MG tablet Take 325 mg by mouth daily (with breakfast) (Patient not taking: Reported on 5/30/2023)       oxybutynin (DITROPAN-XL) 10 MG 24 hr tablet Take 10 mg by mouth daily  (Patient not taking: Reported on 5/30/2023)       oxyCODONE (ROXICODONE) 5 MG IR tablet Take 1 tablet (5 mg) by mouth every 6 hours as needed for severe pain (Patient not taking: Reported on 9/5/2017) 15 tablet 0       PHYSICAL EXAM:  General: Pleasant, NAD.   HEENT: Normocephalic, atraumatic, external  ears and nose normal.   Respiratory: Normal respiratory effort.   Cardiovascular: Pulse is regular.   Musculoskeletal: Gait and station normal.  The joints of her fingers and toes without deformity.  There is no cyanosis of her nailbeds.   EXTREMITIES: Right lower extremity: Few scattered telangiectasias.  Small vein coursing down the right mid pretibial area.  No edema or significant stasis changes.    Left lower extremity: Previous pointing vein coursing down the anterior left leg, coursing posteriorly at the distal third of the leg.  Cluster of telangiectasias in the proximal anteromedial left leg.  No significant edema or stasis changes..    PULSES: R/L (3=normal pulse, 0=no palpable pulse) dorsalis pedis: 2/2; posterior tibial: 2/2.      Neurologic: Grossly normal  Psychiatric: Mood, affect, judgment and insight are normal     ASSESSMENT:  Left leg pain with bilateral pretibial veins.  Discussed the lower EXTR vein anatomy utilizing a leg vein drawing.  We also discussed the pathophysiology of venous insufficiency and the option of continued conservative management with small risks of superficial phlebitis, bleeding and progression of the disease process.    She has not worn compression hose to any significant extent, therefore we fitted her with hose today with expectation she will wear them on a daily basis while working.  She may return for a left lower extremity venous competency study to be discussed for face-to-face office visit.    Briefly discussed treatment options for superficial insufficiency utilizing either ablation or sclerotherapy.  Risks and benefits of each were discussed.    PLAN:  Begin compression hose as in addition to her conservative management routine.  Left lower extremity venous competency study with office visit to discuss results.     Oleg Jensen MD    Dictated using Dragon voice recognition software which may result in transcription errors            Again, thank you  for allowing me to participate in the care of your patient.        Sincerely,        Oleg Jensen MD

## 2023-05-30 NOTE — PROGRESS NOTES
VEINSOLUTIONS CONSULTATION    HPI:    Ayesha Pereyra is a pleasant, self-referred 52 year old female who presents with complaints of right lower extremity pain and varicose veins.  Varicose veins have been present for 2-1/2 years and have become more painful.  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  She has not worn compression hose to any significant extent.    She has a history of deep antibiosis, superficial thrombophlebitis or hemorrhage.    The symptoms interfere with actives of daily living because it makes it impossible for her to stand for long peers of time at work, having to stop and elevate her legs to relieve the pain.  This makes it very difficult for her to do in her work.    Family history significant for varicose veins in her mother.  There is no family history of venous thromboembolism.    PAST MEDICAL HISTORY: No past medical history on file.    PAST SURGICAL HISTORY:   Past Surgical History:   Procedure Laterality Date     COMBINED CYSTOSCOPY, INSERT STENT URETER(S) Right 8/25/2017    Procedure: COMBINED CYSTOSCOPY, INSERT STENT URETER(S);  Cystoscopy,  Right  Ureteral Stent Placement;  Surgeon: Ousmane Grady MD;  Location:  OR     ESOPHAGOSCOPY, GASTROSCOPY, DUODENOSCOPY (EGD), COMBINED N/A 10/12/2022    Procedure: ESOPHAGOGASTRODUODENOSCOPY, WITH BIOPSY;  Surgeon: Erik Dee MD;  Location:  GI     GYN SURGERY       HYSTERECTOMY  1995       FAMILY HISTORY:   Family History   Problem Relation Age of Onset     Ovarian Cancer Mother      Uterine Cancer Mother        SOCIAL HISTORY:   Social History     Tobacco Use     Smoking status: Never     Smokeless tobacco: Never   Vaping Use     Vaping status:  Not on file   Substance Use Topics     Alcohol use: No       REVIEW OF SYSTEMS: Review Of Systems  Skin: negative  Eyes: negative  Ears/Nose/Throat: negative  Respiratory: No shortness of breath, dyspnea on exertion, cough, or hemoptysis  Cardiovascular: negative  Gastrointestinal: negative  Genitourinary: negative  Musculoskeletal: Leg pain  Neurologic: negative  Psychiatric: negative  Hematologic/Lymphatic/Immunologic: negative  Endocrine: negative      Vital signs:  There were no vitals taken for this visit.    Current Outpatient Medications   Medication Sig Dispense Refill     naproxen (NAPROSYN) 500 MG tablet Take 500 mg by mouth       OMEPRAZOLE PO Take 20 mg by mouth every morning       acetaminophen (TYLENOL) 500 MG tablet Take 1-2 tablets by mouth every 6 hours as needed for pain. (Patient not taking: Reported on 5/30/2023) 1 tablet 0     benzonatate (TESSALON) 100 MG capsule Take 1 capsule (100 mg) by mouth 3 times daily as needed for cough (Patient not taking: Reported on 5/30/2023) 30 capsule 0     carboxymethylcellulose (REFRESH PLUS) 0.5 % SOLN ophthalmic solution as needed for dry eyes (Patient not taking: Reported on 5/30/2023)       ferrous sulfate (IRON) 325 (65 FE) MG tablet Take 325 mg by mouth daily (with breakfast) (Patient not taking: Reported on 5/30/2023)       oxybutynin (DITROPAN-XL) 10 MG 24 hr tablet Take 10 mg by mouth daily  (Patient not taking: Reported on 5/30/2023)       oxyCODONE (ROXICODONE) 5 MG IR tablet Take 1 tablet (5 mg) by mouth every 6 hours as needed for severe pain (Patient not taking: Reported on 9/5/2017) 15 tablet 0       PHYSICAL EXAM:  General: Pleasant, NAD.   HEENT: Normocephalic, atraumatic, external ears and nose normal.   Respiratory: Normal respiratory effort.   Cardiovascular: Pulse is regular.   Musculoskeletal: Gait and station normal.  The joints of her fingers and toes without deformity.  There is no cyanosis of her nailbeds.   EXTREMITIES: Right lower  extremity: Few scattered telangiectasias.  Small vein coursing down the right mid pretibial area.  No edema or significant stasis changes.    Left lower extremity: Previous pointing vein coursing down the anterior left leg, coursing posteriorly at the distal third of the leg.  Cluster of telangiectasias in the proximal anteromedial left leg.  No significant edema or stasis changes..    PULSES: R/L (3=normal pulse, 0=no palpable pulse) dorsalis pedis: 2/2; posterior tibial: 2/2.      Neurologic: Grossly normal  Psychiatric: Mood, affect, judgment and insight are normal     ASSESSMENT:  Left leg pain with bilateral pretibial veins.  Discussed the lower EXTR vein anatomy utilizing a leg vein drawing.  We also discussed the pathophysiology of venous insufficiency and the option of continued conservative management with small risks of superficial phlebitis, bleeding and progression of the disease process.    She has not worn compression hose to any significant extent, therefore we fitted her with hose today with expectation she will wear them on a daily basis while working.  She may return for a left lower extremity venous competency study to be discussed for face-to-face office visit.    Briefly discussed treatment options for superficial insufficiency utilizing either ablation or sclerotherapy.  Risks and benefits of each were discussed.    PLAN:  Begin compression hose as in addition to her conservative management routine.  Left lower extremity venous competency study with office visit to discuss results.     Oleg Jensen MD    Dictated using Dragon voice recognition software which may result in transcription errors

## 2023-05-30 NOTE — NURSING NOTE
Patient Reported symptoms:    Right leg   Heaviness Most of the time   Achiness None of the time   Swelling Some of the time   Throbbing None of the time   Itching None of the time   Appearance Moderately noticeable   Impact on work/activities Symptoms but full able to participate    Left Leg   Heaviness Most of the time   Achiness Most of the time   Swelling Some of the time   Throbbing Most of the time   Itching None of the time   Appearance Very noticeable   Impact on work/activities Moderately reduced

## 2023-06-05 ENCOUNTER — OFFICE VISIT (OUTPATIENT)
Dept: URGENT CARE | Facility: URGENT CARE | Age: 53
End: 2023-06-05
Payer: COMMERCIAL

## 2023-06-05 VITALS
WEIGHT: 224 LBS | TEMPERATURE: 98.2 F | OXYGEN SATURATION: 96 % | DIASTOLIC BLOOD PRESSURE: 84 MMHG | BODY MASS INDEX: 38.45 KG/M2 | SYSTOLIC BLOOD PRESSURE: 124 MMHG | HEART RATE: 76 BPM

## 2023-06-05 DIAGNOSIS — R30.0 DYSURIA: Primary | ICD-10-CM

## 2023-06-05 DIAGNOSIS — N39.0 URINARY TRACT INFECTION WITH HEMATURIA, SITE UNSPECIFIED: ICD-10-CM

## 2023-06-05 DIAGNOSIS — R31.9 URINARY TRACT INFECTION WITH HEMATURIA, SITE UNSPECIFIED: ICD-10-CM

## 2023-06-05 DIAGNOSIS — R39.82 CHRONIC URINARY BLADDER PAIN: ICD-10-CM

## 2023-06-05 LAB
ALBUMIN UR-MCNC: 100 MG/DL
APPEARANCE UR: ABNORMAL
BACTERIA #/AREA URNS HPF: ABNORMAL /HPF
BILIRUB UR QL STRIP: NEGATIVE
CLUE CELLS: ABNORMAL
COLOR UR AUTO: YELLOW
GLUCOSE UR STRIP-MCNC: NEGATIVE MG/DL
HGB UR QL STRIP: ABNORMAL
KETONES UR STRIP-MCNC: NEGATIVE MG/DL
LEUKOCYTE ESTERASE UR QL STRIP: ABNORMAL
NITRATE UR QL: POSITIVE
PH UR STRIP: 6 [PH] (ref 5–7)
RBC #/AREA URNS AUTO: ABNORMAL /HPF
SP GR UR STRIP: >=1.03 (ref 1–1.03)
TRICHOMONAS, WET PREP: ABNORMAL
UROBILINOGEN UR STRIP-ACNC: 0.2 E.U./DL
WBC #/AREA URNS AUTO: ABNORMAL /HPF
WBC CLUMPS #/AREA URNS HPF: PRESENT /HPF
WBC'S/HIGH POWER FIELD, WET PREP: ABNORMAL
YEAST, WET PREP: ABNORMAL

## 2023-06-05 PROCEDURE — 87086 URINE CULTURE/COLONY COUNT: CPT | Performed by: PHYSICIAN ASSISTANT

## 2023-06-05 PROCEDURE — 99204 OFFICE O/P NEW MOD 45 MIN: CPT | Performed by: PHYSICIAN ASSISTANT

## 2023-06-05 PROCEDURE — 81001 URINALYSIS AUTO W/SCOPE: CPT | Performed by: PHYSICIAN ASSISTANT

## 2023-06-05 PROCEDURE — 87210 SMEAR WET MOUNT SALINE/INK: CPT | Performed by: PHYSICIAN ASSISTANT

## 2023-06-05 PROCEDURE — 87186 SC STD MICRODIL/AGAR DIL: CPT | Performed by: PHYSICIAN ASSISTANT

## 2023-06-05 RX ORDER — NITROFURANTOIN 25; 75 MG/1; MG/1
100 CAPSULE ORAL 2 TIMES DAILY
Qty: 14 CAPSULE | Refills: 0 | Status: SHIPPED | OUTPATIENT
Start: 2023-06-05

## 2023-06-05 RX ORDER — PHENAZOPYRIDINE HYDROCHLORIDE 200 MG/1
200 TABLET, FILM COATED ORAL 3 TIMES DAILY PRN
Qty: 6 TABLET | Refills: 0 | Status: SHIPPED | OUTPATIENT
Start: 2023-06-05 | End: 2023-06-07

## 2023-06-05 NOTE — PROGRESS NOTES
Assessment & Plan     Dysuria    Pyridium for dysuria  Urine culture pending    - Wet prep - Clinic Collect  - UA Macroscopic with reflex to Microscopic and Culture; Future  - UA Macroscopic with reflex to Microscopic and Culture  - Urine Microscopic Exam  - Urine Culture  - phenazopyridine (PYRIDIUM) 200 MG tablet; Take 1 tablet (200 mg) by mouth 3 times daily as needed for irritation    Urinary tract infection with hematuria, site unspecified    Bladder infections are not contagious. You can't get one from someone else, from a toilet seat, or from sharing a bath.  The most common cause of bladder infections is bacteria from the bowels. The bacteria get onto the skin around the opening of the urethra. From there, they can get into the urine. Then they travel up to the bladder, causing inflammation and infection. This often happens because of:    Wiping incorrectly after urinating. Always wipe from front to back.    Bowel incontinence    Pregnancy    Procedures such as having a catheter put in    Older age    Not emptying your bladder. This can give bacteria a chance to grow in your urine.    Fluid loss (dehydration)    Constipation    Having sex    Using a diaphragm for birth control   Treatment  Bladder infections are diagnosed by a urine test and urine culture. They are treated with antibiotics. They often clear up quickly without problems. Treatment helps prevent a more serious kidney infection.  Medicines  Medicines can help in the treatment of a bladder infection:    Take antibiotics until they are used up, even if you feel better. It's important to finish them to make sure the infection has cleared.    You can use acetaminophen or ibuprofen for pain, fever, or discomfort, unless another medicine was prescribed. If you have long-term (chronic) liver or kidney disease, talk with your healthcare provider before using these medicines. Also talk with your provider if you've ever had a stomach ulcer or GI  "(gastrointestinal) bleeding, or are taking blood-thinner medicines.    If you are given phenazopydridine to reduce burning with urination, it will make your urine a bright orange color. This can stain clothing.  Care and prevention  These self-care steps can help prevent future infections:    Drink plenty of fluids. This helps to prevent dehydration and flush out your bladder. Do this unless you must restrict fluids for other health reasons, or your healthcare provider told you not to.    Clean yourself correctly after going to the bathroom. Wipe from front to back after using the toilet. This helps prevent the spread of bacteria.    Urinate more often. Don't try to hold urine in for a long time.    Wear loose-fitting clothes and cotton underwear. Don't wear tight-fitting pants.    Improve your diet and prevent constipation. Eat more fresh fruits and vegetables, and fiber. Eat less junk foods and fatty foods.    Don't have sex until your symptoms are gone.    Don't have caffeine, alcohol, and spicy foods. These can irritate your bladder.    Urinate right after you have sex to flush out your bladder.    - nitroFURantoin macrocrystal-monohydrate (MACROBID) 100 MG capsule; Take 1 capsule (100 mg) by mouth 2 times daily    Chronic urinary bladder pain    Patient has chronic ongoing bladder pain for months  Referral to Urology for follow up  - Adult Urology  Referral; Future    Review of external notes as documented elsewhere in note       BMI:   Estimated body mass index is 38.45 kg/m  as calculated from the following:    Height as of 10/12/22: 1.626 m (5' 4\").    Weight as of this encounter: 101.6 kg (224 lb).       CONSULTATION/REFERRAL to Urology    No follow-ups on file.    Vasile Aguayo, [facility], PA-C  [facility] Hodge is a 52 year old, presenting for the following health issues:  UTI         View : No data to display.              HPI   Review of Systems "   Constitutional, HEENT, cardiovascular, pulmonary, GI, , musculoskeletal, neuro, skin, endocrine and psych systems are negative, except as otherwise noted.      Objective    /84   Pulse 76   Temp 98.2  F (36.8  C)   Wt 101.6 kg (224 lb)   SpO2 96%   BMI 38.45 kg/m    Body mass index is 38.45 kg/m .  Physical Exam   GENERAL: healthy, alert and no distress  RESP: lungs clear to auscultation - no rales, rhonchi or wheezes  CV: regular rate and rhythm, normal S1 S2, no S3 or S4, no murmur, click or rub, no peripheral edema and peripheral pulses strong  ABDOMEN: soft, nontender, no hepatosplenomegaly, no masses and bowel sounds normal  MS: no gross musculoskeletal defects noted, no edema  SKIN: no suspicious lesions or rashes  NEURO: Normal strength and tone, mentation intact and speech normal  PSYCH: mentation appears normal, affect normal/bright      Results for orders placed or performed in visit on 06/05/23   UA Macroscopic with reflex to Microscopic and Culture     Status: Abnormal    Specimen: Urine, Clean Catch   Result Value Ref Range    Color Urine Yellow Colorless, Straw, Light Yellow, Yellow    Appearance Urine Slightly Cloudy (A) Clear    Glucose Urine Negative Negative mg/dL    Bilirubin Urine Negative Negative    Ketones Urine Negative Negative mg/dL    Specific Gravity Urine >=1.030 1.003 - 1.035    Blood Urine Large (A) Negative    pH Urine 6.0 5.0 - 7.0    Protein Albumin Urine 100 (A) Negative mg/dL    Urobilinogen Urine 0.2 0.2, 1.0 E.U./dL    Nitrite Urine Positive (A) Negative    Leukocyte Esterase Urine Trace (A) Negative   Urine Microscopic Exam     Status: Abnormal   Result Value Ref Range    Bacteria Urine Moderate (A) None Seen /HPF    RBC Urine 5-10 (A) 0-2 /HPF /HPF    WBC Urine  (A) 0-5 /HPF /HPF    WBC Clumps Urine Present (A) None Seen /HPF   Wet prep - Clinic Collect     Status: Abnormal    Specimen: Vagina; Swab   Result Value Ref Range    Trichomonas Absent Absent     Yeast Absent Absent    Clue Cells Absent Absent    WBCs/high power field 1+ (A) None

## 2023-06-06 ENCOUNTER — ANCILLARY PROCEDURE (OUTPATIENT)
Dept: ULTRASOUND IMAGING | Facility: CLINIC | Age: 53
End: 2023-06-06
Attending: SURGERY
Payer: COMMERCIAL

## 2023-06-06 DIAGNOSIS — I83.813 VARICOSE VEINS OF BILATERAL LOWER EXTREMITIES WITH PAIN: ICD-10-CM

## 2023-06-06 LAB — BACTERIA UR CULT: ABNORMAL

## 2023-06-06 PROCEDURE — 93971 EXTREMITY STUDY: CPT | Mod: LT | Performed by: SURGERY

## 2023-06-13 ENCOUNTER — OFFICE VISIT (OUTPATIENT)
Dept: VASCULAR SURGERY | Facility: CLINIC | Age: 53
End: 2023-06-13
Payer: COMMERCIAL

## 2023-06-13 DIAGNOSIS — I83.813 VARICOSE VEINS OF BILATERAL LOWER EXTREMITIES WITH PAIN: Primary | ICD-10-CM

## 2023-06-13 PROCEDURE — 99214 OFFICE O/P EST MOD 30 MIN: CPT | Performed by: SURGERY

## 2023-06-13 RX ORDER — CETIRIZINE HYDROCHLORIDE 10 MG/1
1 TABLET ORAL
COMMUNITY
Start: 2023-04-12

## 2023-06-13 NOTE — LETTER
2023         RE: Ayesha Pereyra  [address]  [place] [place] [facility] [address]        Dear Colleague,    Thank you for referring your patient, Ayesha Pereyra, to the [facility] [place]. Please see a copy of my visit note below.    [facility] [place] Progress Note    Ayesha Pereyra presents in follow-up of left lower extremity pain and varicose veins.  Please see my consultation of the 2023 for details.  She returned on 2023 for a left lower extremity venous competency study, the results were discussed on video visit.    Physical Exam  General: Pleasant female in no acute distress.  Blood pressure 113/62, pulse 77  Extremities: Left lower extremity: Previous pointing vein coursing down the anterior left leg, coursing posteriorly at the distal third of the leg.  Cluster of telangiectasias in the proximal anteromedial left leg.  No significant edema or stasis changes..      Ultrasound:  Name:  Ayesha Pereyra                                            Patient ID: [id number]  Date: 2023                                                     : 1970  Sex: female                                                                 Examined by: HIPOLITO Elise RVT  Age:  52 year old                                                         Reading MD: VANESSA     INDICATION:  Varicose veins of bilateral lower extremities with pain.     EXAM TYPE  LEFT LOWER EXTREMITY VENOUS DUPLEX FOR VENOUS INSUFFICIENCY TECHNICAL SUMMARY     A duplex ultrasound study using color flow was performed to evaluate the left lower extremity veins for valvular incompetence with the patient in a steep reversed trendelenberg.      LEFT:     The deep veins demonstrate phasic flow, compress, and respond to augmentations.  There is no reflux or DVT.  T     The GSV demonstrates phasic flow, compresses, and responds to augmentations from the saphenofemoral junction to the ankle with no evidence of  reflux or thrombus. The greater saphenous vein measures 5.2 mm at the saphenofemoral junction, 3.5 mm in the proxima thigh, and 3.6 mm at the knee.      The AASV is competent ( 3.5 mm)  from the saphenofemoal junction to the distal thigh measuring 2,8 mm as it courses out of the fascia in the lower thigh and courses to the lateral upper calf.     The Giacomini vein is competent ( 0.9 mm) communicating with the small saphenous vein at the knee level.      The SSV demonstrates phasic flow, compresses, and responds to augmentations from the popliteal space to the ankle.  No reflux or thrombus is seen. The saphenopopliteal junction is competent ( 2.8 mm). The saphenopopliteal junction is absent.      Perforators: There is no evidence of incompetent  veins at any level.      RIGHT:  The CFV demonstrates phasic flow, compresses, and responds to augmentations.  There is no DVT.       FINAL SUMMARY:  Left lower extremity:  1.  No deep vein thrombosis or incompetence  2.  Incompetent vein branch coursing down the distal lateral left thigh onto the lateral left leg  3.  Normal left great saphenous, small saphenous and anterior accessory saphenous veins     Right lower extremity  No right common femoral vein thrombosis or incompetence     Incompetence Criteria:   Greater than 500 milliseconds of reflux measured in the superficial and perforating veins and greater than 1000 milliseconds of reflux measured in the deep veins.     LUCIANO Jensen MD, FACS    Assessment:  Left leg pain and varicose veins.  She has no significant axial incompetence.    She has a 3.2 mm diameter incompetent branch with reflux time greater than 500 ms coursing from the anterior accessory saphenous vein down the lateral thigh and onto the lateral left leg.  This seems to and coursed down the left pretibial area, the area of her most significant pain.    The option of continued conservative measures with compression, leg lesion, doctor  measures exercise was discussed.  We also discussed medical necessary, direct vision sclerotherapy of the vein to see if medically relieved.  Details procedure occluding risks of allergic action, superficial thrombophlebitis and hyperpigmentation were discussed.  The patient voiced understanding and her questions were answered.    Plan:  We will submit this for insurance approval, 1 session may be all that is necessary.    Oleg Jensen MD    Dictated using Dragon voice recognition software which may result in transcription errors        After Visit Follow Up  Per Dr. Jensen, patient was recommended to have 1 session of medically necessary sclerotherapy (30 min - no U/S).    Next Steps:    Pt will wait for [facility] surgery marissa, to contact her regarding approval/denial and scheduling the appt.    Insurance Submission  Informed patient this process could take up to 14 business days, but once approved, the patient will be contacted by our surgery scheduler to schedule the above procedure. Gave patient our surgery scheduler's information.    Reviewed with (via ) and gave to patient our vein clinic sclerotherapy packet of information which includes basic sclerotherapy information, pre-treatment instructions and post-treatment instructions. Pt stated she has a pair of knee high hose.    Patient in agreement with plan and had no further questions.    Sharon Su RN  [facility]  Vein Clinic      Again, thank you for allowing me to participate in the care of your patient.        Sincerely,        Oleg Jensen MD

## 2023-06-13 NOTE — PATIENT INSTRUCTIONS
Sclerotherapy: Pre-Treatment Instructions    Recommended Sessions:  ___1___ treatment sessions    Will seek your insurance approval.     Time Required per Treatment Session - About 45 minutes  Please come in 15 minutes before your scheduled appointment.  30 min.  Sclerotherapy treatments last approximately 30 minutes.  5 min.    A staff member will wipe your legs off with warm water and dry them with a wash cloth.                 Then you can put your compression hose on, get dressed and check out.  10 min.  After your treatment, you will be asked to walk around for 10 minutes before you get in your car.    Medications  Five days before your appointment, discontinue aspirin (Bufferin, Anacin, etc.) and Ibuprofen (Motrin, Advil, Aleve, etc.) to reduce bruising. Resume these medications the day following the treatment.    Leg Preparation  Do not shave your legs or apply any oil, lotion or powder the night before or the day of your treatment.    Clothing  Shorts:  Bring a pair of loose, comfortable shorts to wear during your treatment (or you can choose to wear ours). Shoes: Bring comfortable shoes to accommodate the compression hose after your treatment. Do not wear flip-flops or thong-style sandals unless you have open-toe compression hose.    Photographs  Photos will be taken before each treatment. This helps monitor your progress.    Injections  The physician will inject your veins with the sclerotherapy solution chosen to meet your specific needs.    Compression Hose  Please bring your compression hose if you have them. They may also be reserved for you at our clinic. Compression hose must be worn immediately after each sclerotherapy treatment.  The hose must be compression level 20-30, and they must be worn for 24 hours straight after your treatment.  If you have never worn compression hose before, a staff member will teach you how to put them on.             You cannot have a treatment without compression  hose.               They are critical to the success of your treatment!    You may purchase your compression hose from us. We will measure you and have the hose available when you come for your treatment.    Cancellation and Rescheduling  If you need to cancel or reschedule your sclerotherapy treatment, please give our office at least 24 hour notice.    Sclerotherapy: Basic Information        What is sclerotherapy?  Sclerotherapy is a treatment for  spider  veins.  Spider  veins are small veins just under your skin that can look red, blue or purple. Most  spider  veins are only a cosmetic problem.  Spider  veins are not useful and treating them will not affect your circulation.    How does sclerotherapy work?  1.  Injections: A very small needle is used to inject a solution into the  spider  veins. The solution irritates the cells that line the vein walls. This causes the veins to collapse. The vein walls to stick together and they can no longer carry blood. Different solutions are used based on the size of the veins.  2.  Compression:  The spider veins are kept collapsed by wearing compression stockings. Your body will break down and absorb the treated veins. You wear the compression hose for 24 hours after the treatment and then for 4 more days during your waking hours only.    How does the body heal after sclerotherapy?  The process is similar to how your body heals after a bad bruise. It takes 4-6 weeks or more for the healing to be complete. When the healing is complete, the vein is no longer visible. It may take more than one treatment.    How do I get the best results?  It is important to follow the post-sclerotherapy instructions. The best results require time and patience. The injection sites will continue to heal and fade for months after a treatment. Please discuss your expectations with your doctor to keep them realistic. Your doctor will do everything possible to meet or exceed your expectations.    How  many treatments are needed?  After your initial exam, your doctor will give you an estimate of the number of treatments that may be required. It depends upon the size, type, and quantity of your  spider  veins and on the doctor's assessment, your history and expectations. You may end up needing fewer or more treatments.    How soon can I have another treatment?  Additional treatments are scheduled every 4-6 weeks to allow time for the body to respond to the previous treatment.    Common Side Effects:  Itching  The areas that were injected may itch. This is usually mild and lasts less than a day. Do not use lotions or creams on your legs until the injection sites have healed over.    Pain  It is common to have some tenderness at the injection sites. Injection of the solution can be uncomfortable, but is usually well tolerated by most patients. The tenderness is temporary, lasting 24 hours at most. Tylenol or Ibuprofen can be used, if needed, following the product directions.    Bruising  This may occur at the injections sites. Bruising may be minimized by avoiding Aspirin and Ibuprofen products for five days before each treatment session.    Hyperpigmentation  A light brown discoloration of the skin may develop along the veins in the areas injected. Approximately 20-30% of patients treated note the discoloration (which is lighter and less obvious than the veins that are being treated). The hyperpigmentation usually fades in a couple of weeks, but may take several months to a year to totally resolve. There is a 1% chance of hyperpigmentation continuing after one year.    Trapped blood  A small amount of blood may become trapped and hardened in the veins. This may feel like a knot or cord and it may look dark blue or bruised. This is a common occurrence. You may need to return before your next treatment so this area can be drained to remove the trapped blood. This will reduce the hyperpigmentation that can occur. The  chance of this occurring can be decreased with proper use of compression hose after your treatment.    Matting  Matting is the formation of new, fine  spider  veins in the area injected.  It occurs in approximately 10% of patients injected. The exact reason for this is unknown. If untreated, the matting usually resolves in 3 to 12 months, but very rarely, it can be permanent. If the matting does not fade, it can be re-injected.    Rare Side Effects:  Ulceration at injection sites  Very rarely, a small ulcer will occur at the site where a vein is injected. An ulcer can take 4 to 6 weeks to completely heal. A small scar may result.    Allergic reactions  There is a very rare incidence of an allergic reaction to the solution injected. You will be observed for such reaction and will be treated appropriately should it occur. Please inform us of any allergy history.    Pulmonary embolus/Deep vein thrombosis  This is a blood clot which moves to the lungs/a blood clot in the deep vein system. There is an extraordinarily low incidence of this complication.      SCLEROTHERAPY AFTER CARE  Immediately:  After treatment, walk for 10-15 minutes before getting in your car.  If your trip home is more than 1 hour, stop and walk around for 5-10 minutes. Avoid sitting or standing for extended periods.   First 24 hours: Wear your compression continuously, even while in bed. After the 24 hours, you may shower if you want to. Put your hose back on, unless you are going to bed. You should NOT wear compression to bed after the first 24 hours. You may fly the next day, but wear your compression.   For 5 days: Wear the compression hose for waking hours only. You may continue to wear them longer than 5 days if you prefer.   For days 5-7: Walking is encouraged, as it promotes efficient circulation in your veins. You may do activities that raise your heart rate, but do NOT run, jog, do high impact aerobics, or weight lifting. After 7 days, no  activity restrictions.  Shaving: Wait a few days to shave or apply lotion.   Bathing: Do NOT take hot baths or sit in a hot tub for 7-10 days.    For 1 year: Wear SPF 30 sunscreen on your legs when in the sun. This is very important! It helps prevent darkening of the skin at the injection sites.   Medications: You may resume your usual medications, including aspirin or ibuprofen.    Common Things to Expect       Compression must be worn for the first 24 hours and then during the day for 5-7 days.    If larger veins are treated with ultrasound-guided sclerotherapy, you will have redness, firmness, tenderness, and swelling.  This firmness and tenderness may take 3-6 months to resolve. Ibuprofen and compression hose will aid in this process.    You will have bruising that can last up to 3 weeks. Most fading of the veins will occur between 3 and 6 weeks after treatment.    You may notice brown discoloration (hyperpigmentation) at the treatment site.  This should fade with time, but will take 3 months to 1 year to fully heal.     Some treated veins may look darker because of trapped blood within the vein. This trapped blood can be removed at a minimum of 1 month following treatment. Larger veins are more likely to develop trapped blood.    It is very important for you to use at least SPF 30 sunscreen in order to help prevent the discoloration of your skin.    Migraines rarely occur following sclerotherapy, but are more likely in patients with a history of migraines.  Treat as you would any other migraine.      MD Lingo last reviewed this educational content on 11/1/2019 2000-2021 [employer]. All rights reserved. This information is not intended as a substitute for professional medical care. Always follow your healthcare professional's instructions.

## 2023-06-13 NOTE — PROGRESS NOTES
[facility] [place] Progress Note    Ayesha Pereyra presents in follow-up of left lower extremity pain and varicose veins.  Please see my consultation of the 2023 for details.  She returned on 2023 for a left lower extremity venous competency study, the results were discussed on video visit.    Physical Exam  General: Pleasant female in no acute distress.  Blood pressure 113/62, pulse 77  Extremities: Left lower extremity: Previous pointing vein coursing down the anterior left leg, coursing posteriorly at the distal third of the leg.  Cluster of telangiectasias in the proximal anteromedial left leg.  No significant edema or stasis changes..      Ultrasound:  Name:  Ayesha Pereyra                                            Patient ID: [id number]  Date: 2023                                                     : 1970  Sex: female                                                                 Examined by: HIPOLITO Elise RVT  Age:  52 year old                                                         Reading MD: VANESSA     INDICATION:  Varicose veins of bilateral lower extremities with pain.     EXAM TYPE  LEFT LOWER EXTREMITY VENOUS DUPLEX FOR VENOUS INSUFFICIENCY TECHNICAL SUMMARY     A duplex ultrasound study using color flow was performed to evaluate the left lower extremity veins for valvular incompetence with the patient in a steep reversed trendelenberg.      LEFT:     The deep veins demonstrate phasic flow, compress, and respond to augmentations.  There is no reflux or DVT.  T     The GSV demonstrates phasic flow, compresses, and responds to augmentations from the saphenofemoral junction to the ankle with no evidence of reflux or thrombus. The greater saphenous vein measures 5.2 mm at the saphenofemoral junction, 3.5 mm in the proxima thigh, and 3.6 mm at the knee.      The AASV is competent ( 3.5 mm)  from the saphenofemoal junction to the distal thigh measuring 2,8 mm as  it courses out of the fascia in the lower thigh and courses to the lateral upper calf.     The Giacomini vein is competent ( 0.9 mm) communicating with the small saphenous vein at the knee level.      The SSV demonstrates phasic flow, compresses, and responds to augmentations from the popliteal space to the ankle.  No reflux or thrombus is seen. The saphenopopliteal junction is competent ( 2.8 mm). The saphenopopliteal junction is absent.      Perforators: There is no evidence of incompetent  veins at any level.      RIGHT:  The CFV demonstrates phasic flow, compresses, and responds to augmentations.  There is no DVT.       FINAL SUMMARY:  Left lower extremity:  1.  No deep vein thrombosis or incompetence  2.  Incompetent vein branch coursing down the distal lateral left thigh onto the lateral left leg  3.  Normal left great saphenous, small saphenous and anterior accessory saphenous veins     Right lower extremity  No right common femoral vein thrombosis or incompetence     Incompetence Criteria:   Greater than 500 milliseconds of reflux measured in the superficial and perforating veins and greater than 1000 milliseconds of reflux measured in the deep veins.     LUCIANO Jensen MD, FACS    Assessment:  Left leg pain and varicose veins.  She has no significant axial incompetence.    She has a 3.2 mm diameter incompetent branch with reflux time greater than 500 ms coursing from the anterior accessory saphenous vein down the lateral thigh and onto the lateral left leg.  This seems to and coursed down the left pretibial area, the area of her most significant pain.    The option of continued conservative measures with compression, leg lesion, doctor measures exercise was discussed.  We also discussed medical necessary, direct vision sclerotherapy of the vein to see if medically relieved.  Details procedure occluding risks of allergic action, superficial thrombophlebitis and hyperpigmentation were  discussed.  The patient voiced understanding and her questions were answered.    Plan:  We will submit this for insurance approval, 1 session may be all that is necessary.    Oleg Jensen MD    Dictated using Dragon voice recognition software which may result in transcription errors        After Visit Follow Up  Per Dr. Jensen, patient was recommended to have 1 session of medically necessary sclerotherapy (30 min - no U/S).    Next Steps:    Pt will wait for shahab Brooks, to contact her regarding approval/denial and scheduling the appt.    Insurance Submission  Informed patient this process could take up to 14 business days, but once approved, the patient will be contacted by our surgery scheduler to schedule the above procedure. Gave patient our surgery scheduler's information.    Reviewed with (via ) and gave to patient our vein clinic sclerotherapy packet of information which includes basic sclerotherapy information, pre-treatment instructions and post-treatment instructions. Pt stated she has a pair of knee high hose.    Patient in agreement with plan and had no further questions.    Sharon Su RN  [facility]  Vein Clinic

## 2023-08-15 ENCOUNTER — OFFICE VISIT (OUTPATIENT)
Dept: VASCULAR SURGERY | Facility: CLINIC | Age: 53
End: 2023-08-15
Payer: COMMERCIAL

## 2023-08-15 DIAGNOSIS — I83.812 VARICOSE VEINS OF LEFT LOWER EXTREMITY WITH PAIN: Primary | ICD-10-CM

## 2023-08-15 PROCEDURE — 36471 NJX SCLRSNT MLT INCMPTNT VN: CPT | Mod: LT | Performed by: SURGERY

## 2023-08-15 RX ORDER — FLUOXETINE 10 MG/1
10 TABLET, FILM COATED ORAL DAILY
COMMUNITY
Start: 2023-07-17

## 2023-08-15 NOTE — LETTER
8/15/2023         RE: Ayesha Pereyra  [id number]  [place] [id number]        Dear Colleague,    Thank you for referring your patient, Ayesha Pereyra, to the [facility] [place]. Please see a copy of my visit note below.        Vein Clinic Sclerotherapy Note     Indications:  Recurrent left lower extremity varicose veins with pain; symptoms recalcitrant to conservative measures     Procedure:  Medically necessary, direct vision sclerotherapy left distal anterior thigh and lateral left leg veins     Procedure description  Details of procedure including risks of allergic reaction, deep vein thrombosis, ulceration, superficial thrombophlebitis and hyperpigmentation were discussed.  The patient voiced understanding and wished to proceed.  Informed consent was obtained.    I donned the Syris headlight and injected the vein coursing from the distal third of the anteromedial left thigh, laterally across the thigh, lateral to the left knee and onto the lateral left leg using 0.5% polidocanol foam.  The veins seem to fill nicely.  I had the patient perform ankle pumps.  We used a total of 1 syringe (3 mL) of 0.5% polidocanol foam    Sclerotherapy    Date/Time: 8/15/2023 2:28 PM    Performed by: Oleg Jensen MD  Authorized by: Oleg Jensen MD    Time out: Immediately prior to the procedure a time out was called    Type:  Medically Necessary  Vein:  Multiple Veins  Procedure side:  Left  Solution/Amount:  .5 POLIDOCANOL  Syringes:  1 syringe  Patient tolerance:  Patient tolerated the procedure well with no immediate complications  Wrap/Hose:  Moshe Jensen MD    Dictated using Dragon voice recognition software which may result in transcription errors      Again, thank you for allowing me to participate in the care of your patient.        Sincerely,        Oleg Jensen MD

## 2023-08-15 NOTE — PROGRESS NOTES
Vein Clinic Sclerotherapy Note     Indications:  Recurrent left lower extremity varicose veins with pain; symptoms recalcitrant to conservative measures     Procedure:  Medically necessary, direct vision sclerotherapy left distal anterior thigh and lateral left leg veins     Procedure description  Details of procedure including risks of allergic reaction, deep vein thrombosis, ulceration, superficial thrombophlebitis and hyperpigmentation were discussed.  The patient voiced understanding and wished to proceed.  Informed consent was obtained.    I donned the Syris headlight and injected the vein coursing from the distal third of the anteromedial left thigh, laterally across the thigh, lateral to the left knee and onto the lateral left leg using 0.5% polidocanol foam.  The veins seem to fill nicely.  I had the patient perform ankle pumps.  We used a total of 1 syringe (3 mL) of 0.5% polidocanol foam    Sclerotherapy    Date/Time: 8/15/2023 2:28 PM    Performed by: Oleg Jensen MD  Authorized by: Oleg Jensen MD    Time out: Immediately prior to the procedure a time out was called    Type:  Medically Necessary  Vein:  Multiple Veins  Procedure side:  Left  Solution/Amount:  .5 POLIDOCANOL  Syringes:  1 syringe  Patient tolerance:  Patient tolerated the procedure well with no immediate complications  Wrap/Hose:  Moshe Jensen MD    Dictated using Dragon voice recognition software which may result in transcription errors

## 2023-09-22 NOTE — RESULT ENCOUNTER NOTE
Preliminary Beta strep group A r/o culture is PENDING and/or NEGATIVE at this time.   No changes in treatment per Strep culture protocol. Bexarotene Counseling:  I discussed with the patient the risks of bexarotene including but not limited to hair loss, dry lips/skin/eyes, liver abnormalities, hyperlipidemia, pancreatitis, depression/suicidal ideation, photosensitivity, drug rash/allergic reactions, hypothyroidism, anemia, leukopenia, infection, cataracts, and teratogenicity.  Patient understands that they will need regular blood tests to check lipid profile, liver function tests, white blood cell count, thyroid function tests and pregnancy test if applicable.

## 2023-11-09 ENCOUNTER — ANCILLARY PROCEDURE (OUTPATIENT)
Dept: GENERAL RADIOLOGY | Facility: CLINIC | Age: 53
End: 2023-11-09
Attending: EMERGENCY MEDICINE
Payer: COMMERCIAL

## 2023-11-09 ENCOUNTER — OFFICE VISIT (OUTPATIENT)
Dept: URGENT CARE | Facility: URGENT CARE | Age: 53
End: 2023-11-09
Payer: COMMERCIAL

## 2023-11-09 VITALS
DIASTOLIC BLOOD PRESSURE: 73 MMHG | BODY MASS INDEX: 38.79 KG/M2 | SYSTOLIC BLOOD PRESSURE: 116 MMHG | HEART RATE: 73 BPM | OXYGEN SATURATION: 95 % | WEIGHT: 226 LBS | TEMPERATURE: 97.9 F

## 2023-11-09 DIAGNOSIS — R10.9 RIGHT FLANK PAIN: Primary | ICD-10-CM

## 2023-11-09 DIAGNOSIS — R30.0 DYSURIA: ICD-10-CM

## 2023-11-09 DIAGNOSIS — N93.9 VAGINAL BLEEDING: ICD-10-CM

## 2023-11-09 DIAGNOSIS — R10.9 RIGHT FLANK PAIN: ICD-10-CM

## 2023-11-09 LAB
ALBUMIN UR-MCNC: NEGATIVE MG/DL
ANION GAP SERPL CALCULATED.3IONS-SCNC: 8 MMOL/L (ref 3–14)
APPEARANCE UR: CLEAR
BACTERIA #/AREA URNS HPF: ABNORMAL /HPF
BASOPHILS # BLD AUTO: 0 10E3/UL (ref 0–0.2)
BASOPHILS NFR BLD AUTO: 0 %
BILIRUB UR QL STRIP: NEGATIVE
BUN SERPL-MCNC: 14 MG/DL (ref 7–30)
CALCIUM SERPL-MCNC: 9.7 MG/DL (ref 8.5–10.1)
CHLORIDE BLD-SCNC: 106 MMOL/L (ref 94–109)
CLUE CELLS: ABNORMAL
CO2 SERPL-SCNC: 28 MMOL/L (ref 20–32)
COLOR UR AUTO: YELLOW
CREAT SERPL-MCNC: 0.8 MG/DL (ref 0.52–1.04)
EGFRCR SERPLBLD CKD-EPI 2021: 87 ML/MIN/1.73M2
EOSINOPHIL # BLD AUTO: 0.1 10E3/UL (ref 0–0.7)
EOSINOPHIL NFR BLD AUTO: 2 %
ERYTHROCYTE [DISTWIDTH] IN BLOOD BY AUTOMATED COUNT: 14.6 % (ref 10–15)
GLUCOSE BLD-MCNC: 118 MG/DL (ref 70–99)
GLUCOSE UR STRIP-MCNC: NEGATIVE MG/DL
HCT VFR BLD AUTO: 45.6 % (ref 35–47)
HGB BLD-MCNC: 14.2 G/DL (ref 11.7–15.7)
HGB UR QL STRIP: ABNORMAL
IMM GRANULOCYTES # BLD: 0 10E3/UL
IMM GRANULOCYTES NFR BLD: 0 %
KETONES UR STRIP-MCNC: NEGATIVE MG/DL
LEUKOCYTE ESTERASE UR QL STRIP: NEGATIVE
LYMPHOCYTES # BLD AUTO: 1.6 10E3/UL (ref 0.8–5.3)
LYMPHOCYTES NFR BLD AUTO: 23 %
MCH RBC QN AUTO: 25.6 PG (ref 26.5–33)
MCHC RBC AUTO-ENTMCNC: 31.1 G/DL (ref 31.5–36.5)
MCV RBC AUTO: 82 FL (ref 78–100)
MONOCYTES # BLD AUTO: 0.5 10E3/UL (ref 0–1.3)
MONOCYTES NFR BLD AUTO: 7 %
NEUTROPHILS # BLD AUTO: 4.6 10E3/UL (ref 1.6–8.3)
NEUTROPHILS NFR BLD AUTO: 68 %
NITRATE UR QL: NEGATIVE
PH UR STRIP: 6.5 [PH] (ref 5–7)
PLATELET # BLD AUTO: 224 10E3/UL (ref 150–450)
POTASSIUM BLD-SCNC: 4.3 MMOL/L (ref 3.4–5.3)
RBC # BLD AUTO: 5.55 10E6/UL (ref 3.8–5.2)
RBC #/AREA URNS AUTO: ABNORMAL /HPF
SODIUM SERPL-SCNC: 142 MMOL/L (ref 133–144)
SP GR UR STRIP: 1.02 (ref 1–1.03)
SQUAMOUS #/AREA URNS AUTO: ABNORMAL /LPF
TRICHOMONAS, WET PREP: ABNORMAL
UROBILINOGEN UR STRIP-ACNC: 0.2 E.U./DL
WBC # BLD AUTO: 6.7 10E3/UL (ref 4–11)
WBC #/AREA URNS AUTO: ABNORMAL /HPF
WBC'S/HIGH POWER FIELD, WET PREP: ABNORMAL
YEAST, WET PREP: ABNORMAL

## 2023-11-09 PROCEDURE — 36415 COLL VENOUS BLD VENIPUNCTURE: CPT | Performed by: EMERGENCY MEDICINE

## 2023-11-09 PROCEDURE — 99214 OFFICE O/P EST MOD 30 MIN: CPT | Performed by: EMERGENCY MEDICINE

## 2023-11-09 PROCEDURE — 81001 URINALYSIS AUTO W/SCOPE: CPT | Performed by: EMERGENCY MEDICINE

## 2023-11-09 PROCEDURE — 87210 SMEAR WET MOUNT SALINE/INK: CPT | Performed by: EMERGENCY MEDICINE

## 2023-11-09 PROCEDURE — 85025 COMPLETE CBC W/AUTO DIFF WBC: CPT | Performed by: EMERGENCY MEDICINE

## 2023-11-09 PROCEDURE — 74019 RADEX ABDOMEN 2 VIEWS: CPT | Mod: TC | Performed by: RADIOLOGY

## 2023-11-09 PROCEDURE — 80048 BASIC METABOLIC PNL TOTAL CA: CPT | Performed by: EMERGENCY MEDICINE

## 2023-11-09 RX ORDER — CYCLOBENZAPRINE HCL 10 MG
10 TABLET ORAL
COMMUNITY
Start: 2023-02-22

## 2023-11-09 RX ORDER — HYDROQUINONE 40 MG/G
CREAM TOPICAL
COMMUNITY
Start: 2023-07-19

## 2023-11-09 RX ORDER — FLUTICASONE PROPIONATE 50 MCG
SPRAY, SUSPENSION (ML) NASAL
COMMUNITY
Start: 2023-01-03

## 2023-11-09 RX ORDER — HYDROXYZINE HYDROCHLORIDE 25 MG/1
TABLET, FILM COATED ORAL
COMMUNITY
Start: 2023-06-23

## 2023-11-09 NOTE — PROGRESS NOTES
Assessment & Plan     Diagnosis:    ICD-10-CM    1. Right flank pain  R10.9 XR Abdomen 2 Views     CBC with platelets and differential     Basic metabolic panel  (Ca, Cl, CO2, Creat, Gluc, K, Na, BUN)     CBC with platelets and differential     Basic metabolic panel  (Ca, Cl, CO2, Creat, Gluc, K, Na, BUN)     Adult Urology  Referral      2. Dysuria  R30.0 UA Macroscopic with reflex to Microscopic and Culture - Clinic Collect     Wet prep - Clinic Collect     UA Microscopic with Reflex to Culture      3. Vaginal bleeding  N93.9           Medical Decision Making:  Ayesha Pereyra is a 53 year old female presents to clinic for evaluation of right flank pain, dysuria, increased frequency with urination.    --UA negative for indications of infection, reveals small amt of blood in urine.   --Blood work including CBC/BMP are unremarkable.   --KUB Xray negative for visible kidney stones. Given frequent intermittent lower urinary symptoms and flank pain with hx of ureterolithiasis. Recommended follow-up with urology for further workup. Will try Tylenol/NSAIDs for pain, heating pad, monitor urine for stone.  --Recommend PCP follow-up for concerns for postmenopausal vaginal bleeding earlier this month, should be seen as soon as possible.     Follow-up:   Patient was advised to return to follow-up with urology for further workup, referral pended. Patient educated on red flag symptoms and asked to go directly to the ED if these symptoms present themselves.         Physician Assistant Attestation       I saw and evaluated (Ayesha Pereyra) as part of a shared APRN/PA visit.       I personally reviewed the vital signs.       I personally performed the substantive portion of the medical decision making for this visit - please see the PALLAVI's documentation for full details.         I personally performed the substantive portion of the physical exam - please see the PALLAVI's documentation for full details.       (Provider name  and title: (Romel Palomares PA-C)   Date of Service (when I saw the patient):  (11/9/23)       35 minutes spent by me on the date of the encounter doing chart review, review of outside records, review of test results, interpretation of tests, patient visit, and documentation     Zoya Melvin NP Student  Romel Palomares PA-C  [facility]      Chief Complaint   Patient presents with    Urgent Care     Pt reports odor in urine, dysuria, lower back pain and urinary frequency X 2 weeks . Pt had a kidney stone removal.       Subjective   HPI   Ayesha Pereyra is a 53 year old female who presents with right flank pain, increased frequency and pain with urination for two weeks. She had a kidney stone back in 2017 on the right side that she believes she passed on her own, but these symptoms are very similar to when she had the stone. She also reports vaginal bleeding earlier this month for four days that she believes to be unrelated to the current issue, and is postmenopausal. She plans to make an appt with her primary for this concern. Denies hematuria, pain radiating into the groin or lower extremities, nausea, vomiting, fevers.    No past medical history on file.  Current Outpatient Medications   Medication Sig Dispense Refill    acetaminophen (TYLENOL) 500 MG tablet Take 1-2 tablets by mouth every 6 hours as needed for pain. 1 tablet 0    OMEPRAZOLE PO Take 20 mg by mouth every morning      benzonatate (TESSALON) 100 MG capsule Take 1 capsule (100 mg) by mouth 3 times daily as needed for cough (Patient not taking: Reported on 8/15/2023) 30 capsule 0    carboxymethylcellulose (REFRESH PLUS) 0.5 % SOLN ophthalmic solution as needed for dry eyes (Patient not taking: Reported on 5/30/2023)      cetirizine (ZYRTEC) 10 MG tablet Take 1 tablet by mouth daily at 2 pm (Patient not taking: Reported on 11/9/2023)      cyclobenzaprine (FLEXERIL) 10 MG tablet Take 10 mg by mouth (Patient not taking: Reported on  11/9/2023)      ferrous sulfate (IRON) 325 (65 FE) MG tablet Take 325 mg by mouth daily (with breakfast) (Patient not taking: Reported on 5/30/2023)      FLUoxetine (PROZAC) 10 MG tablet Take 10 mg by mouth daily (Patient not taking: Reported on 11/9/2023)      fluticasone (FLONASE) 50 MCG/ACT nasal spray SPRAY 1-2 SPRAY INTO BOTH NOSTRILS TWICE A DAY FOR NASAL CONGESTION AND ALLERGY (Patient not taking: Reported on 11/9/2023)      hydroquinone (MARIAM) 4 % external cream Apply 1 Application  topically two times daily as needed. (Patient not taking: Reported on 11/9/2023)      hydrOXYzine (ATARAX) 25 MG tablet Take 1/2-1 tablet by mouth TID PRN for anxiety or sleep  TOME 1/2-1 TABLETA POR LA BOCA hasta 3 veces al joshua KARTIK SEA NECESARIO para ansiedad e/o insomnia (Patient not taking: Reported on 11/9/2023)      naproxen (NAPROSYN) 500 MG tablet Take 500 mg by mouth (Patient not taking: Reported on 11/9/2023)      nitroFURantoin macrocrystal-monohydrate (MACROBID) 100 MG capsule Take 1 capsule (100 mg) by mouth 2 times daily (Patient not taking: Reported on 6/13/2023) 14 capsule 0    oxybutynin (DITROPAN-XL) 10 MG 24 hr tablet Take 10 mg by mouth daily  (Patient not taking: Reported on 5/30/2023)      oxyCODONE (ROXICODONE) 5 MG IR tablet Take 1 tablet (5 mg) by mouth every 6 hours as needed for severe pain (Patient not taking: Reported on 9/5/2017) 15 tablet 0     Social History     Tobacco Use    Smoking status: Never    Smokeless tobacco: Never   Substance Use Topics    Alcohol use: No        Allergies   Allergen Reactions    Keflex [Cephalexin]          Review of Systems   Review of Systems   All systems negative except for those listed above in HPI.        Objective    Temp: 97.9  F (36.6  C) Temp src: Tympanic BP: 116/73 Pulse: 73     SpO2: 95 %       Physical Exam   Physical Exam  Vitals reviewed.   Constitutional:       Appearance: Normal appearance.   Cardiovascular:      Rate and Rhythm: Normal rate and  regular rhythm.      Pulses: Normal pulses.      Heart sounds: Normal heart sounds.   Pulmonary:      Effort: Pulmonary effort is normal.      Breath sounds: Normal breath sounds.   Abdominal:      General: There is no distension.      Tenderness: There is right CVA tenderness. There is no left CVA tenderness or rebound.   Skin:     General: Skin is warm and dry.   Neurological:      General: No focal deficit present.      Mental Status: She is alert and oriented to person, place, and time.   Psychiatric:         Mood and Affect: Mood normal.          Results for orders placed or performed in visit on 11/09/23 (from the past 24 hour(s))   UA Macroscopic with reflex to Microscopic and Culture - Clinic Collect    Specimen: Urine, Clean Catch   Result Value Ref Range    Color Urine Yellow Colorless, Straw, Light Yellow, Yellow    Appearance Urine Clear Clear    Glucose Urine Negative Negative mg/dL    Bilirubin Urine Negative Negative    Ketones Urine Negative Negative mg/dL    Specific Gravity Urine 1.020 1.003 - 1.035    Blood Urine Small (A) Negative    pH Urine 6.5 5.0 - 7.0    Protein Albumin Urine Negative Negative mg/dL    Urobilinogen Urine 0.2 0.2, 1.0 E.U./dL    Nitrite Urine Negative Negative    Leukocyte Esterase Urine Negative Negative   UA Microscopic with Reflex to Culture   Result Value Ref Range    Bacteria Urine None Seen None Seen /HPF    RBC Urine 0-2 0-2 /HPF /HPF    WBC Urine 0-5 0-5 /HPF /HPF    Squamous Epithelials Urine Few (A) None Seen /LPF    Narrative    Urine Culture not indicated   Wet prep - Clinic Collect    Specimen: Vagina; Swab   Result Value Ref Range    Trichomonas Absent Absent    Yeast Absent Absent    Clue Cells Absent Absent    WBCs/high power field 1+ (A) None   CBC with platelets and differential    Narrative    The following orders were created for panel order CBC with platelets and differential.  Procedure                               Abnormality         Status                      ---------                               -----------         ------                     CBC with platelets and d...[[id number]]  Abnormal            Final result                 Please view results for these tests on the individual orders.   Basic metabolic panel  (Ca, Cl, CO2, Creat, Gluc, K, Na, BUN)   Result Value Ref Range    Sodium 142 133 - 144 mmol/L    Potassium 4.3 3.4 - 5.3 mmol/L    Chloride 106 94 - 109 mmol/L    Carbon Dioxide (CO2) 28 20 - 32 mmol/L    Anion Gap 8 3 - 14 mmol/L    Urea Nitrogen 14 7 - 30 mg/dL    Creatinine 0.80 0.52 - 1.04 mg/dL    Calcium 9.7 8.5 - 10.1 mg/dL    Glucose 118 (H) 70 - 99 mg/dL    GFR Estimate 87 >60 mL/min/1.73m2   CBC with platelets and differential   Result Value Ref Range    WBC Count 6.7 4.0 - 11.0 10e3/uL    RBC Count 5.55 (H) 3.80 - 5.20 10e6/uL    Hemoglobin 14.2 11.7 - 15.7 g/dL    Hematocrit 45.6 35.0 - 47.0 %    MCV 82 78 - 100 fL    MCH 25.6 (L) 26.5 - 33.0 pg    MCHC 31.1 (L) 31.5 - 36.5 g/dL    RDW 14.6 10.0 - 15.0 %    Platelet Count 224 150 - 450 10e3/uL    % Neutrophils 68 %    % Lymphocytes 23 %    % Monocytes 7 %    % Eosinophils 2 %    % Basophils 0 %    % Immature Granulocytes 0 %    Absolute Neutrophils 4.6 1.6 - 8.3 10e3/uL    Absolute Lymphocytes 1.6 0.8 - 5.3 10e3/uL    Absolute Monocytes 0.5 0.0 - 1.3 10e3/uL    Absolute Eosinophils 0.1 0.0 - 0.7 10e3/uL    Absolute Basophils 0.0 0.0 - 0.2 10e3/uL    Absolute Immature Granulocytes 0.0 <=0.4 10e3/uL             Zoya Melvin NP Student  Romel Palomares PA-C  [facility] CARE

## 2023-11-09 NOTE — PROGRESS NOTES
Assessment & Plan     Diagnosis:    (R30.0) Dysuria  (primary encounter diagnosis)  Comment: ***  Plan: UA Macroscopic with reflex to Microscopic and         Culture - Clinic Collect, Wet prep - Clinic         Collect, UA Microscopic with Reflex to Culture        ***        Medical Decision Making:  Ayesha Pereyra is a 53 year old female who presents to clinic today for evaluation of urinary frequency, urgency and dysuria.     This clinically is consistent with a urinary tract infection.  Urinalysis confirms the infection.  The patient is not pregnant.***No concern for pregnancy; patient declines testing***. There has been no fever, confusion, flank pain or significant abdominal pain.  The patient is not concerned about STDs and testing not indicated***. There is no clinical evidence of pyelonephritis, appendicitis, colitis, diverticulitis or any intraabdominal catastrophe. The patient will be started on antibiotics for the infection. Go to the ER if increasing pain, vomiting, fever, or inability to tolerate the oral antibiotic.  Follow up with primary physician is indicated if not improving in 2-3 days.     Treat for PYELO***:  Urinalysis confirms the infection.*** Given ***, would treat with a pyelonephritis course of antibiotics to be safe although more likely this is still just a UTI at this time.  There are *** systemic symptoms present including flank pain.  There is no clinical evidence of appendicitis, colitis, diverticulitis or any intraabdominal catastrophe. The patient will be started on antibiotics for the infection. Go to the ER if increasing pain, vomiting, fever, or inability to tolerate the oral antibiotic.  Urine culture was sent and a provider will contact the patient if a change of antibiotics is indicated.*** Follow up with primary physician is indicated if not improving in 2-3 days. The patient verbalized understanding and agreement with the plan including reasons to go to the emergency room.   Patient was discharged in stable condition.    GO TO ER***:  Differential diagnosis includes Pyelonephritis, UTI, Appendicitis, Bowel Obstruction, Ulcer, Ischemia, Diverticulitis, Pancreatitis, kidney stone, Enteritis/Colitis, Ruptured Ovarian Cyst; among others.    Patient with systemic symptoms present including ***. Lab work shows ***. Given the patient's history of *** and clinical examination here, moderate/severe distress*** as well as the fact that I cannot rule out many of the above listed intraabdominal catastrophes here in clinic; I recommended the patient go to the ER now for further evaluation. Patient/caregiver*** verbalized understanding and agreement with the plan. Patient discharged in stable condition.     Patient voices understanding and agreement with the plan including reasons to go to the ER immediately as well as to be seen by a more consistent care-giver, such as their PMD, if the symptoms persist more than one day.       {2021 E&M time (Optional):847876}      Romel Palomares PA-C  [facility] URGENT [facility]    Subjective     Ayesha Pereyra is a 53 year old female who presents with *** to clinic today for the following health issues:  Chief Complaint   Patient presents with    Urgent Care     Pt reports odor in urine, dysuria, lower back pain and urinary frequency X 2 weeks . Pt had a kidney stone removal.         HPI  Patient states that for the past *** days they experienced a burning*** sensation, and frequency*** and urgency***. This has gotten *** over time. They note that they have mild*** moderate*** lower abdominal pain. Patient denies any flank or back pain***, fever, nausea, vomiting, diarrhea, inability to urinate, vaginal discharge/bleeding or concerns for STDS***. Patient notes they are on birth control; no concerns for pregnancy***.    Review of Systems    See HPI    Objective      Vitals:    Patient Vitals for the past 24 hrs:   BP Temp Temp src Pulse SpO2 Weight   11/09/23  1011 116/73 97.9  F (36.6  C) Tympanic 73 95 % 102.5 kg (226 lb)         Vital signs reviewed by: Romel Palomares PA-C    Physical Exam   Constitutional: The patient is oriented to person, place, and time***. Alert and cooperative. No acute distress.***  Mouth: Mucous membranes are moist.  Cardiovascular: Regular rate and rhythm.  Pulmonary/Chest: Effort normal. No respiratory distress.   GI: Abdomen is soft, non-tender and non-distended throughout. ***Abdomen with mild suprapubic tenderness to palpation.*** No rebound or guarding. No McBurney point tenderness.   MSK: No CVA tenderness bilaterally.  Neurological: Alert and oriented x3.***     Labs/Imaging:    Results for orders placed or performed in visit on 11/09/23   XR Abdomen 2 Views     Status: None    Narrative    XR ABDOMEN 2 VIEWS 11/9/2023 11:01 AM    HISTORY: Right flank pain    COMPARISON: None.      Impression    IMPRESSION: There are no visible urinary tract calculi. Bowel gas  pattern is nonobstructed. No free intraperitoneal air.    DILLON COOPER MD         SYSTEM ID:  [id number]   Results for orders placed or performed in visit on 11/09/23   UA Macroscopic with reflex to Microscopic and Culture - Clinic Collect     Status: Abnormal    Specimen: Urine, Clean Catch   Result Value Ref Range    Color Urine Yellow Colorless, Straw, Light Yellow, Yellow    Appearance Urine Clear Clear    Glucose Urine Negative Negative mg/dL    Bilirubin Urine Negative Negative    Ketones Urine Negative Negative mg/dL    Specific Gravity Urine 1.020 1.003 - 1.035    Blood Urine Small (A) Negative    pH Urine 6.5 5.0 - 7.0    Protein Albumin Urine Negative Negative mg/dL    Urobilinogen Urine 0.2 0.2, 1.0 E.U./dL    Nitrite Urine Negative Negative    Leukocyte Esterase Urine Negative Negative   UA Microscopic with Reflex to Culture     Status: Abnormal   Result Value Ref Range    Bacteria Urine None Seen None Seen /HPF    RBC Urine 0-2 0-2 /HPF /HPF    WBC Urine 0-5 0-5  /HPF /HPF    Squamous Epithelials Urine Few (A) None Seen /LPF    Narrative    Urine Culture not indicated   Wet prep - Clinic Collect     Status: Abnormal    Specimen: Vagina; Swab   Result Value Ref Range    Trichomonas Absent Absent    Yeast Absent Absent    Clue Cells Absent Absent    WBCs/high power field 1+ (A) None   CBC with platelets and differential     Status: None (In process)    Narrative    The following orders were created for panel order CBC with platelets and differential.  Procedure                               Abnormality         Status                     ---------                               -----------         ------                     CBC with platelets and d...[[id number]]                      In process                   Please view results for these tests on the individual orders.         Romel Palomares PA-C, November 9, 2023

## 2024-01-11 ENCOUNTER — OFFICE VISIT (OUTPATIENT)
Dept: URGENT CARE | Facility: URGENT CARE | Age: 54
End: 2024-01-11
Payer: OTHER MISCELLANEOUS

## 2024-01-11 ENCOUNTER — ANCILLARY PROCEDURE (OUTPATIENT)
Dept: GENERAL RADIOLOGY | Facility: CLINIC | Age: 54
End: 2024-01-11
Attending: PHYSICIAN ASSISTANT
Payer: COMMERCIAL

## 2024-01-11 VITALS
DIASTOLIC BLOOD PRESSURE: 59 MMHG | BODY MASS INDEX: 40.17 KG/M2 | WEIGHT: 234 LBS | RESPIRATION RATE: 20 BRPM | OXYGEN SATURATION: 96 % | SYSTOLIC BLOOD PRESSURE: 128 MMHG | TEMPERATURE: 97.3 F | HEART RATE: 59 BPM

## 2024-01-11 DIAGNOSIS — T07.XXXA MULTIPLE ABRASIONS: ICD-10-CM

## 2024-01-11 DIAGNOSIS — R07.81 RIB PAIN ON RIGHT SIDE: ICD-10-CM
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
## 2024-01-11 PROCEDURE — 99214 OFFICE O/P EST MOD 30 MIN: CPT | Performed by: PHYSICIAN ASSISTANT

## 2024-01-11 PROCEDURE — 71101 X-RAY EXAM UNILAT RIBS/CHEST: CPT | Mod: TC | Performed by: RADIOLOGY

## 2024-01-11 RX ORDER — HYDROCODONE BITARTRATE AND ACETAMINOPHEN 5; 325 MG/1; MG/1
1 TABLET ORAL EVERY 6 HOURS PRN
Qty: 10 TABLET | Refills: 0 | Status: SHIPPED | OUTPATIENT
Start: 2024-01-11 | End: 2024-01-14

## 2024-01-11 RX ORDER — NAPROXEN 500 MG/1
500 TABLET ORAL 2 TIMES DAILY WITH MEALS
Qty: 30 TABLET | Refills: 3 | Status: SHIPPED | OUTPATIENT
Start: 2024-01-11 | End: 2025-01-10

## 2024-01-11 NOTE — PROGRESS NOTES
"  Assessment & Plan     Fall, initial encounter    Fall yesterday    Multiple abrasions    Dress wounds with bacitracin  Monitor for infection    Rib pain on right side    Xray rib Negative for acute findings, read by Vasile Aguayo PA-C [facility] at time of visit.    You can get a bruised rib if you fall or get hit, such as in an accident or while playing sports. The medical term for a bruise is \"contusion.\" Small blood vessels get torn and leak blood under the skin.  Most people think of a bruise as a black-and-blue area. But bones and muscles can also get bruised. An injury may damage the rib but not cause a bruise that you can see.  Sometimes it can be hard to tell if a rib is bruised or broken. The symptoms may be the same. And a broken bone can't always be seen on an X-ray. But the treatment for a bruised rib is often the same as treatment for a broken one.  An injury to the ribs can cause pain. The pain may be worse when you breathe deeply, cough, or sneeze.  In most cases, a bruised rib will heal on its own. You can take pain medicine while the rib mends. Pain relief allows you to take deep breaths.    Ice compresses  Naprosyn for inflammation and pain  Vicodin for night time or break through pain    - XR Ribs & Chest Right G/E 3 Views; Future  - naproxen (NAPROSYN) 500 MG tablet; Take 1 tablet (500 mg) by mouth 2 times daily (with meals)  - HYDROcodone-acetaminophen (NORCO) 5-325 MG tablet; Take 1 tablet by mouth every 6 hours as needed for severe pain    Review of external notes as documented elsewhere in note     At today's visit with Ayesha Pereyra , we discussed results, diagnosis, medications and formulated a plan.  We also discussed red flags for immediate return to clinic/ER, as well as indications for follow up with PCP if not improved in 3 days. Patient understood and agreed to plan. Ayesha Pereyra was discharged with stable vitals and has no further questions.       No follow-ups on file.    Vasile BROWN" ELIDA Aguayo, JOSE  [facility] URGENT [facility] COMFORT Hodge is a 53 year old, presenting for the following health issues:  Urgent Care (Pt reports pain in the right side from a fall onset yesterday.)      HPI   Review of Systems   Constitutional, HEENT, cardiovascular, pulmonary, GI, , musculoskeletal, neuro, skin, endocrine and psych systems are negative, except as otherwise noted.      Objective    /59   Pulse 59   Temp 97.3  F (36.3  C) (Tympanic)   Resp 20   Wt 106.1 kg (234 lb)   SpO2 96%   BMI 40.17 kg/m    Body mass index is 40.17 kg/m .  Physical Exam   GENERAL: healthy, alert and no distress  EYES: Eyes grossly normal to inspection, PERRL and conjunctivae and sclerae normal  HENT: ear canals and TM's normal, nose and mouth without ulcers or lesions  NECK: no adenopathy, no asymmetry, masses, or scars and thyroid normal to palpation  RESP: lungs clear to auscultation - no rales, rhonchi or wheezes  CV: regular rate and rhythm, normal S1 S2, no S3 or S4, no murmur, click or rub, no peripheral edema and peripheral pulses strong  ABDOMEN: soft, nontender, no hepatosplenomegaly, no masses and bowel sounds normal  MS: pos for right side rib pain, tenderness, painful with movements and coughing  SKIN: pos for abrasions on hand and fingers  NEURO: Normal strength and tone, mentation intact and speech normal  PSYCH: mentation appears normal, affect normal/bright    Xray - Reviewed and interpreted by me.  Negative for acute findings, read by Vasile MARRERO at time of visit.

## 2024-02-27 ENCOUNTER — OFFICE VISIT (OUTPATIENT)
Dept: UROLOGY | Facility: CLINIC | Age: 54
End: 2024-02-27
Attending: PHYSICIAN ASSISTANT
Payer: COMMERCIAL

## 2024-02-27 VITALS
BODY MASS INDEX: 38.58 KG/M2 | HEIGHT: 64 IN | OXYGEN SATURATION: 97 % | HEART RATE: 70 BPM | WEIGHT: 226 LBS | DIASTOLIC BLOOD PRESSURE: 80 MMHG | SYSTOLIC BLOOD PRESSURE: 110 MMHG

## 2024-02-27 DIAGNOSIS — R39.82 CHRONIC URINARY BLADDER PAIN: ICD-10-CM

## 2024-02-27 DIAGNOSIS — Z78.9 LANGUAGE BARRIER TO COMMUNICATION: Primary | ICD-10-CM

## 2024-02-27 DIAGNOSIS — R10.9 RIGHT FLANK PAIN: ICD-10-CM

## 2024-02-27 LAB
ALBUMIN UR-MCNC: 100 MG/DL
APPEARANCE UR: CLEAR
BILIRUB UR QL STRIP: NEGATIVE
COLOR UR AUTO: YELLOW
GLUCOSE UR STRIP-MCNC: NEGATIVE MG/DL
HGB UR QL STRIP: ABNORMAL
KETONES UR STRIP-MCNC: NEGATIVE MG/DL
LEUKOCYTE ESTERASE UR QL STRIP: NEGATIVE
NITRATE UR QL: NEGATIVE
PH UR STRIP: 5.5 [PH] (ref 5–7)
SP GR UR STRIP: >=1.03 (ref 1–1.03)
UROBILINOGEN UR STRIP-ACNC: 0.2 E.U./DL

## 2024-02-27 PROCEDURE — 81003 URINALYSIS AUTO W/O SCOPE: CPT | Mod: QW | Performed by: UROLOGY

## 2024-02-27 PROCEDURE — 99244 OFF/OP CNSLTJ NEW/EST MOD 40: CPT | Performed by: UROLOGY

## 2024-02-27 ASSESSMENT — PAIN SCALES - GENERAL: PAINLEVEL: NO PAIN (0)

## 2024-02-27 NOTE — NURSING NOTE
Chief Complaint   Patient presents with    Flank Pain     Follow up    bladder pain     Follow up    July Horn LPN

## 2024-02-27 NOTE — LETTER
2/27/2024       RE: Ayesha Pereyra  [address]  [place] [facility] [address]     Dear Colleague,    Thank you for referring your patient, Ayesha Pereyra, to the [facility] at [facility]. Please see a copy of my visit note below.    Assessment & Plan  ASSESSMENT and PLAN  53 year old female here for evaluation of urinary discomfort.    1. Chronic urinary bladder pain  2. Language barrier to communication (used ipad )    - Adult Urology  Referral  - UA without Microscopic [[id number]]; Future  - UA without Microscopic [[id number]]  - conjugated estrogens (PREMARIN) 0.625 MG/GM vaginal cream; Place 0.5 g vaginally twice a week Daily for two weeks, then twice a week after  Dispense: 30 g; Refill: 1    Given negative urine and no prior benefits from other medicines, like has genitourinary syndrome of menopause.  Will try vaginal estrogen cream, pea sized amount daily for 2 weeks then twice weekly after.    Counseled that foul smelling urine likely from low fluid intake.    Will have her see Armida Wilson for follow-up and further evaluation if needed.    Time spent: 30 minutes spent on the date of the encounter doing chart review, history and exam, documentation and further activities as noted above.    Dawit Farrar MD   Urology  [facility]         Subjective    CHIEF COMPLAINT  History of urinary stones and bothersome dysuria/lower urinary tract symptoms      HPI   Ayesha Pereyra is a very pleasant 53 year old female who presents with a history of Pain/dysuria pressure with bladder filling.  Has been going on for years and nothing (anticholinergic/antibiotics) has helped in past.  No hx of recent UTIs.  Does have foul smelling urine.  Last menstrual period 5 years ago, had bleeding 3 mo ago that she notes her provider stated was not abnormal.    Had a right ureteral stone in 2017 for stone disease.   "    Past Medical History:   Diagnosis Date    Arthritis      Past Surgical History:   Procedure Laterality Date    COMBINED CYSTOSCOPY, INSERT STENT URETER(S) Right 08/25/2017    Procedure: COMBINED CYSTOSCOPY, INSERT STENT URETER(S);  Cystoscopy,  Right  Ureteral Stent Placement;  Surgeon: Ousmane Grady MD;  Location:  OR    ESOPHAGOSCOPY, GASTROSCOPY, DUODENOSCOPY (EGD), COMBINED N/A 10/12/2022    Procedure: ESOPHAGOGASTRODUODENOSCOPY, WITH BIOPSY;  Surgeon: Erik Dee MD;  Location:  GI    GYN SURGERY      HYSTERECTOMY  01/01/1995     Family History   Problem Relation Age of Onset    Ovarian Cancer Mother     Uterine Cancer Mother      Social History     Socioeconomic History    Marital status:      Spouse name: None    Number of children: None    Years of education: None    Highest education level: None   Tobacco Use    Smoking status: Never    Smokeless tobacco: Never   Substance and Sexual Activity    Alcohol use: No    Drug use: No        Component      Latest Ref Rng 2/27/2024  3:19 PM   Color Urine      Colorless, Straw, Light Yellow, Yellow  Yellow    Appearance Urine      Clear  Clear    Glucose Urine      Negative mg/dL Negative    Bilirubin Urine      Negative  Negative    Ketones Urine      Negative mg/dL Negative    Specific Gravity Urine      1.003 - 1.035  >=1.030    Blood Urine      Negative  Trace !    pH Urine      5.0 - 7.0  5.5    Protein Albumin Urine      Negative mg/dL 100 !    Nitrite Urine      Negative  Negative    Leukocyte Esterase Urine      Negative  Negative    Urobilinogen Urine      0.2, 1.0 E.U./dL 0.2       Legend:  ! Abnormal         Objective    PHYSICAL EXAM  Patient is a 53 year old  female   Vitals: Blood pressure 110/80, pulse 70, height 1.626 m (5' 4\"), weight 102.5 kg (226 lb), SpO2 97%, not currently breastfeeding.  Body mass index is 38.79 kg/m .  Gen: no acute distress     "

## 2024-03-01 ENCOUNTER — TELEPHONE (OUTPATIENT)
Dept: UROLOGY | Facility: CLINIC | Age: 54
End: 2024-03-01
Payer: COMMERCIAL

## 2024-03-01 DIAGNOSIS — R39.82 CHRONIC URINARY BLADDER PAIN: Primary | ICD-10-CM

## 2024-03-14 NOTE — TELEPHONE ENCOUNTER
Retail Pharmacy Prior Authorization Team   Phone: [phone]    PA Initiation    Medication: PREMARIN 0.625 MG/GM VA CREA  Insurance Company: [employer] - Phone [phone] Fax [phone]  Pharmacy Filling the Rx: [id number] [place], [place] - [address]  Filling Pharmacy Phone: [phone]  Filling Pharmacy Fax:    Start Date: 3/14/2024

## 2024-03-15 RX ORDER — ESTRADIOL 10 UG/1
10 INSERT VAGINAL
Qty: 24 TABLET | Refills: 3 | Status: SHIPPED | OUTPATIENT
Start: 2024-03-18

## 2024-03-15 NOTE — TELEPHONE ENCOUNTER
Note: Due to record-high volumes, our turn-around time is taking longer than usual . We are currently 10 business days behind in the pools.   We are working diligently to submit all requests in a timely manner and in the order they are received. Please only flag TRUE URGENT requests as high priority to the pool at this time.   If you have questions - please send a note/message in the active PA encounter and send back to the RPPA (Retail Pharmacy Prior Authorization) team [[id number]].    If you have more specific questions about our process please reach out to our supervisor Aubrie Ramirez.   Thank you!     We are currently submitting requests we received on 03/04/2024    PRIOR AUTHORIZATION DENIED    Medication: PREMARIN 0.625 MG/GM VA CREA  Insurance Company: [employer] - Phone [phone] Fax [phone]  Denial Date: 3/14/2024  Denial Rational:             Appeal Information:   If provider would like to appeal please review the plan's reasons for denial listed above. Please utilize that information to complete letter and provide specific, detailed clinical information/rationale of your patient's health status to address their denial reasons.          Patient Notified: No

## 2024-04-16 DIAGNOSIS — R39.82 CHRONIC URINARY BLADDER PAIN: ICD-10-CM

## 2024-05-27 RX ORDER — CONJUGATED ESTROGENS 0.62 MG/G
CREAM VAGINAL
Qty: 30 G | Refills: 1 | OUTPATIENT
Start: 2024-05-27

## 2024-07-15 RX ORDER — ESTRADIOL 10 UG/1
10 TABLET VAGINAL
Qty: 24 TABLET | Refills: 3 | OUTPATIENT
Start: 2024-07-15

## 2024-10-17 ENCOUNTER — APPOINTMENT (OUTPATIENT)
Dept: INTERPRETER SERVICES | Facility: CLINIC | Age: 54
End: 2024-10-17
Payer: COMMERCIAL

## 2024-10-17 ENCOUNTER — TELEPHONE (OUTPATIENT)
Dept: UROLOGY | Facility: CLINIC | Age: 54
End: 2024-10-17
Payer: COMMERCIAL

## 2024-10-17 DIAGNOSIS — R39.82 CHRONIC URINARY BLADDER PAIN: ICD-10-CM

## 2024-10-17 NOTE — TELEPHONE ENCOUNTER
[facility] Call Center    Phone Message    May a detailed message be left on voicemail: yes     Reason for Call: Other: Pt called saying she cancelled her appointment last time due to not having insurance but has insurance now and wants to be seen since she is in pain. Pt said she is not sure if she will have this insurance next year as well and would like to be seen soon please and thank you.    Action Taken: Other:   [facility]    Travel Screening: Not Applicable     Date of Service:

## 2024-10-17 NOTE — TELEPHONE ENCOUNTER
Called the pt back and discussed sx.  Has incontinence and feeling of full bladder.  Denies pain, fever and is able to urinate.  Advised if sx present for severe pain, fever/chills or stops urinating needs to go to ER.  Pt verbalized understanding and agrees to the plan and scheduled f/up with CLOVER Wilson as before she had to cancel due to no insurance reason.  Will send message to Dr Elkins for med refill or change as well as put pt on wait list for sooner appt.  RADHA Diallo  Care Coordinator Urology  [phone]

## 2024-10-18 RX ORDER — ESTRADIOL 10 UG/1
10 INSERT VAGINAL
Qty: 24 TABLET | Refills: 3 | Status: SHIPPED | OUTPATIENT
Start: 2024-10-21 | End: 2024-10-30

## 2024-10-30 ENCOUNTER — OFFICE VISIT (OUTPATIENT)
Dept: UROLOGY | Facility: CLINIC | Age: 54
End: 2024-10-30
Payer: COMMERCIAL

## 2024-10-30 VITALS — HEIGHT: 64 IN | BODY MASS INDEX: 38.58 KG/M2 | WEIGHT: 226 LBS

## 2024-10-30 DIAGNOSIS — N39.46 MIXED INCONTINENCE: Primary | ICD-10-CM

## 2024-10-30 DIAGNOSIS — M62.89 PELVIC FLOOR DYSFUNCTION: ICD-10-CM

## 2024-10-30 DIAGNOSIS — N95.2 VAGINAL ATROPHY: ICD-10-CM

## 2024-10-30 DIAGNOSIS — R35.1 NOCTURIA: ICD-10-CM

## 2024-10-30 DIAGNOSIS — R39.82 CHRONIC URINARY BLADDER PAIN: ICD-10-CM

## 2024-10-30 DIAGNOSIS — R35.0 URINARY FREQUENCY: ICD-10-CM

## 2024-10-30 DIAGNOSIS — N32.81 OAB (OVERACTIVE BLADDER): ICD-10-CM

## 2024-10-30 DIAGNOSIS — R39.15 URINARY URGENCY: ICD-10-CM

## 2024-10-30 DIAGNOSIS — K59.00 CONSTIPATION, UNSPECIFIED CONSTIPATION TYPE: ICD-10-CM

## 2024-10-30 LAB — RESIDUAL VOLUME (RV) (EXTERNAL): 14

## 2024-10-30 PROCEDURE — 51798 US URINE CAPACITY MEASURE: CPT | Performed by: PHYSICIAN ASSISTANT

## 2024-10-30 PROCEDURE — T1013 SIGN LANG/ORAL INTERPRETER: HCPCS | Mod: U4

## 2024-10-30 PROCEDURE — 99215 OFFICE O/P EST HI 40 MIN: CPT | Mod: 25 | Performed by: PHYSICIAN ASSISTANT

## 2024-10-30 RX ORDER — ESTRADIOL 0.1 MG/G
2 CREAM VAGINAL
Qty: 40 G | Refills: 5 | Status: SHIPPED | OUTPATIENT
Start: 2024-10-31

## 2024-10-30 ASSESSMENT — PAIN SCALES - GENERAL: PAINLEVEL_OUTOF10: NO PAIN (0)

## 2024-10-30 NOTE — NURSING NOTE
Chief Complaint   Patient presents with    UTI     Here for bladder scan and exam.     post void residual by bladder scan 14 ml  Bree Rivera LPN

## 2024-10-30 NOTE — PROGRESS NOTES
Urology Clinic      Name: Ayesha Pereyra    MRN: [id number]   YOB: 1970  Accompanied at today's visit by:self                 Chief Complaint:   Bladder pain          History of Present Illness:   October 30, 2024    HISTORY:   We have been following 54 year old Ayesha Pereyra for kidney stones, OAB, bladder pain and  syndrome. Of note, followed with [facility] urology in 2017 for OAB, nocturia, dysuria. Was started on oxybutynin and advised to avoid bladder irritants. She then had had urosepsis secondary to ureterovesical stone which she had cysto with right ureteral stent placement on 8/25/2017. She was then seen on 12/13/2017 at [facility] urology team and advised to start estrogen cream and mycoplasma/ureaplasma which was negative at that time. However her PVR was 167mL at that time. Do not see that she followed up with [facility] urology team following that encounter. She then saw Dr. Farrar on 2/27/24 for chronic bladder pain and was thought to be secondary to genitourinary syndrome of menopause and started on estrogen cream. Discussed at that time with patient that foul odor of urine was from low fluid intake. Of note, has failed oxybutynin in the past for her her chronic bladder pain. Here today for follow-up. Most recent imaging noted on 11/9/23 was abdominal radiograph and negative for stones    Here today to discuss bladder sx. Reports urinary urgency/frequency, nocturia, and YOUSIF. UUI is most bothersome. HDenies gross hematuria or stone sx. Denies hx of abnormal pap smears, vaginal bleeding or hx of cancer. Reports family hx of breast cancer. Reports dyspareunia due to vaginal dryness and sometimes had vaginal burning. Stopped using estrogen cream after trying for 13 days. Was using estrogen cream daily. Reports constipation. Takes miralax prn. No UTI sx today. Patient voices no other concerns at this time.            Allergies:     Allergies   Allergen Reactions    Keflex  "[Cephalexin]             Medications:     Current Outpatient Medications   Medication Sig Dispense Refill    acetaminophen (TYLENOL) 500 MG tablet Take 1-2 tablets by mouth every 6 hours as needed for pain. 1 tablet 0    [START ON 10/31/2024] estradiol (ESTRACE) 0.1 MG/GM vaginal cream Place 2 g vaginally twice a week. 40 g 5    naproxen (NAPROSYN) 500 MG tablet Take 1 tablet (500 mg) by mouth 2 times daily (with meals) 30 tablet 3    vibegron (GEMTESA) 75 MG TABS tablet Take 1 tablet (75 mg) by mouth daily. 90 tablet 3     No current facility-administered medications for this visit.               Past  Surgical History:     Past Surgical History:   Procedure Laterality Date    COMBINED CYSTOSCOPY, INSERT STENT URETER(S) Right 08/25/2017    Procedure: COMBINED CYSTOSCOPY, INSERT STENT URETER(S);  Cystoscopy,  Right  Ureteral Stent Placement;  Surgeon: Ousmane Grady MD;  Location:  OR    ESOPHAGOSCOPY, GASTROSCOPY, DUODENOSCOPY (EGD), COMBINED N/A 10/12/2022    Procedure: ESOPHAGOGASTRODUODENOSCOPY, WITH BIOPSY;  Surgeon: Erik Dee MD;  Location:  GI    GYN SURGERY      HYSTERECTOMY  01/01/1995             Physical Exam:     Vitals:    10/30/24 0834   Weight: 102.5 kg (226 lb)   Height: 1.626 m (5' 4.02\")     PSYCH: NAD  EYES: EOMI  NEURO: AAO x3  : Vulva is normal in appearance. Urethra normal. negative for REYES with reduction of speculum when instructed to cough. Vaginal mucosa atrophic. No pain to palpation on internal or external exam. Large amount of stool noted in colon/rectum; no communication between rectum/colon and vagina. No prolapse appreciated. Strength 2/5. No obvious masses, lesions, ulcers, bleeding noted on internal or external exam.      LABS:    PVR 14ml    Creatinine   Date Value Ref Range Status   11/09/2023 0.80 0.52 - 1.04 mg/dL Final   08/27/2017 0.61 0.52 - 1.04 mg/dL Final            Assessment and Plan:   54 year old is a pleasant female who has OAB, YOUSIF, " vaginal atrophy, kidney stones, constipation    Plan:  -  PVR WNL  - PFPT referral placed  - start gemtesa 75mg daily. Patient to call clinic if too expensive.   - avoid bladder irritants. IC diet handout given.  - start daily fiber supplement and miralax prn.   - resume estrogen cream; ordered today. Educated patient on how to use and to use twice weekly at night.   - Plan to follow-up in 4 months with PVR check.   - consider repeating imaging given hx of stones.  - consider sleep study if night time symptoms persist.   - After discussing the assessment and plan with patient, patient verbalizes understanding and agrees to the above plan. All questions answered.     Other orders as below:  Orders Placed This Encounter   Procedures    Physical Therapy  Referral     50 minutes spent on the date of the encounter doing chart review, review of [facility] health records (total time 5 minutes), review of Dr. Farrar's notes, review of labs, review of test results, exam, ordering medication, referral to PFPT, interpretation of tests, patient visit and documentation.      Sara Wilson PA-C  Urology  October 30, 2024      Patient Care Team:  Clinic, [facility] as PCP - General  Maria C Berry PA-C Wise, James L, MD as MD (Gastroenterology)  Oleg Jensen MD as Assigned Heart and Vascular Provider  Dawit Farrar MD as MD (Urology)  Romel Palomares PA-C as Referring Physician (Urgent Care)  Dawit Farrar MD as MD (Urology)  Dawit Farrar MD as Assigned Surgical Provider

## 2024-10-30 NOTE — LETTER
10/30/2024       RE: Ayesha Pereyra  [address]  [facility] [address]     Dear Colleague,    Thank you for referring your patient, Ayesha Pereyra, to the [facility] at [facility]. Please see a copy of my visit note below.    Urology Clinic      Name: Ayesha Pereyra    MRN: [id number]   YOB: 1970  Accompanied at today's visit by:self                 Chief Complaint:   Bladder pain          History of Present Illness:   October 30, 2024    HISTORY:   We have been following 54 year old Ayesha Pereyra for kidney stones, OAB, bladder pain and  syndrome. Of note, followed with [facility] urology in 2017 for OAB, nocturia, dysuria. Was started on oxybutynin and advised to avoid bladder irritants. She then had had urosepsis secondary to ureterovesical stone which she had cysto with right ureteral stent placement on 8/25/2017. She was then seen on 12/13/2017 at [facility] urology team and advised to start estrogen cream and mycoplasma/ureaplasma which was negative at that time. However her PVR was 167mL at that time. Do not see that she followed up with [facility] urology team following that encounter. She then saw Dr. Farrar on 2/27/24 for chronic bladder pain and was thought to be secondary to genitourinary syndrome of menopause and started on estrogen cream. Discussed at that time with patient that foul odor of urine was from low fluid intake. Of note, has failed oxybutynin in the past for her her chronic bladder pain. Here today for follow-up. Most recent imaging noted on 11/9/23 was abdominal radiograph and negative for stones    Here today to discuss bladder sx. Reports urinary urgency/frequency, nocturia, and YOUSIF. UUI is most bothersome. HDenies gross hematuria or stone sx. Denies hx of abnormal pap smears, vaginal bleeding or hx of cancer. Reports family hx of breast cancer. Reports dyspareunia due  "to vaginal dryness and sometimes had vaginal burning. Stopped using estrogen cream after trying for 13 days. Was using estrogen cream daily. Reports constipation. Takes miralax prn. No UTI sx today. Patient voices no other concerns at this time.            Allergies:     Allergies   Allergen Reactions     Keflex [Cephalexin]             Medications:     Current Outpatient Medications   Medication Sig Dispense Refill     acetaminophen (TYLENOL) 500 MG tablet Take 1-2 tablets by mouth every 6 hours as needed for pain. 1 tablet 0     [START ON 10/31/2024] estradiol (ESTRACE) 0.1 MG/GM vaginal cream Place 2 g vaginally twice a week. 40 g 5     naproxen (NAPROSYN) 500 MG tablet Take 1 tablet (500 mg) by mouth 2 times daily (with meals) 30 tablet 3     vibegron (GEMTESA) 75 MG TABS tablet Take 1 tablet (75 mg) by mouth daily. 90 tablet 3     No current facility-administered medications for this visit.               Past  Surgical History:     Past Surgical History:   Procedure Laterality Date     COMBINED CYSTOSCOPY, INSERT STENT URETER(S) Right 08/25/2017    Procedure: COMBINED CYSTOSCOPY, INSERT STENT URETER(S);  Cystoscopy,  Right  Ureteral Stent Placement;  Surgeon: Ousmane Grady MD;  Location:  OR     ESOPHAGOSCOPY, GASTROSCOPY, DUODENOSCOPY (EGD), COMBINED N/A 10/12/2022    Procedure: ESOPHAGOGASTRODUODENOSCOPY, WITH BIOPSY;  Surgeon: Erik Dee MD;  Location:  GI     GYN SURGERY       HYSTERECTOMY  01/01/1995             Physical Exam:     Vitals:    10/30/24 0834   Weight: 102.5 kg (226 lb)   Height: 1.626 m (5' 4.02\")     PSYCH: NAD  EYES: EOMI  NEURO: AAO x3  : Vulva is normal in appearance. Urethra normal. negative for REYES with reduction of speculum when instructed to cough. Vaginal mucosa atrophic. No pain to palpation on internal or external exam. Large amount of stool noted in colon/rectum; no communication between rectum/colon and vagina. No prolapse appreciated. Strength " 2/5. No obvious masses, lesions, ulcers, bleeding noted on internal or external exam.      LABS:    PVR 14ml    Creatinine   Date Value Ref Range Status   11/09/2023 0.80 0.52 - 1.04 mg/dL Final   08/27/2017 0.61 0.52 - 1.04 mg/dL Final            Assessment and Plan:   54 year old is a pleasant female who has OAB, YOUSIF, vaginal atrophy, kidney stones, constipation    Plan:  -  PVR WNL  - PFPT referral placed  - start gemtesa 75mg daily. Patient to call clinic if too expensive.   - avoid bladder irritants. IC diet handout given.  - start daily fiber supplement and miralax prn.   - resume estrogen cream; ordered today. Educated patient on how to use and to use twice weekly at night.   - Plan to follow-up in 4 months with PVR check.   - consider repeating imaging given hx of stones.  - consider sleep study if night time symptoms persist.   - After discussing the assessment and plan with patient, patient verbalizes understanding and agrees to the above plan. All questions answered.     Other orders as below:  Orders Placed This Encounter   Procedures     Physical Therapy  Referral     50 minutes spent on the date of the encounter doing chart review, review of [facility] records (total time 5 minutes), review of Dr. Farrar's notes, review of labs, review of test results, exam, ordering medication, referral to PFPT, interpretation of tests, patient visit and documentation.      Sara Wilson PA-C  Urology  October 30, 2024      Patient Care Team:  [facility], [facility] as PCP - Maria C Moncada PA-C Wise, James L, MD as MD (Gastroenterology)  Oleg Jensen MD as Assigned Heart and Vascular Provider  Dawit Farrar MD as MD (Urology)  Romel Palomares PA-C as Referring Physician (Urgent Care)  Dawit Farrar MD as MD (Urology)  Dawit Farrar MD as Assigned Surgical Provider           Again, thank you for allowing me to participate in the care of your patient.       Sincerely,    DEMETRA LEONARD PA-C

## 2024-10-30 NOTE — PATIENT INSTRUCTIONS
- Start estrogen cream 2/week at night. Apply blueberry sized amount on finger and rub along vaginal tissue like a face cream. Call clinic if medication is too expensive. You can also try using a Good Rx card to see if cheaper than insurance coverage.    _______________________________________________       Dietary recommendations:    Foods/Beverages to Avoid:  caffeinated beverages, carbonated beverages, coffee, decaffeinated coffee, tea, caffeine free tea, soda, alcoholic beverages, grapefruit, lemon, oranges, pineapple, cranberry juice, grapefruit juice, orange juice, pineapple juice, tomato or tomato based products, red dyed products, spicy foods, chili, horseradish, vinegar, MSG, artificial sweeteners, Mexican food, East Timorese food, Senegalese food, sugar, strawberries, cranberries, chocolate, mocha, key lime, salsa, spicy/hot chips/crackers, chili pepper flakes, hot/spicey wing sauces, jalapeno, citric acid, citrus fruits, soy, oil & vinegar salad dressings, Vitamin C & B6.    Foods/beverages that are least irritating:  water, milk, bananas, blueberries, honeydew melon, peers, raisins, watermelon, broccoli, Mcdonough sprouts, cabbage, carrots, cauliflower, celery, cucumber, mushrooms, peas, radishes, squash, zucchini, white potatoes, sweet potatoes/yams, chicken, eggs, turkey, beef, pork, lamb, shrimp, tunafish, salmon, oat, rice, pretzels, popcorn, applesauce, apricots, artichokes, asparagus, avocado, basil, beans, fresh beats, bell peppers, nonprocessed or overly spiced cheeses, plain chips, corn, oatmeal, cottage cheese, garlic, basil, dill, mushrooms, creamers without soybean oil, nuts, oils, black olives, oregano, parsley, non-chocolate or fruit pastries, non-soy protein powders, pumpkin, quinoa, radish, fresh rhubarb, rice, rosemary, roxanne, ranch dressing, table salt, non-chocolate puddings, honey, thyme, corn or flour tortillas, vanilla, Vitamins (A, B1, B2, B12, D, E, K), flour, cool whip.    ___________________________________________________________     Start Gemtesa 75mg once daily for your urinary urgency/frequency and urge to go and can't make it in time.      Start metamucil or citrucel once daily over the counter for your bowels. Take MiraLAX as needed.       ________________________    You have been referred to Pelvic Floor Physical Therapy. They will call you to schedule this appointment  Locations for Pelvic Floor Physical Therapy:  [facility]   [facility]   M [facility] services - [facility] [place]   M [facility]   [facility] - [facility] services - [facility]    _________________________________________________

## 2024-10-31 ENCOUNTER — TELEPHONE (OUTPATIENT)
Dept: UROLOGY | Facility: CLINIC | Age: 54
End: 2024-10-31
Payer: COMMERCIAL

## 2024-10-31 NOTE — TELEPHONE ENCOUNTER
Central Prior Authorization Team  Phone: [phone]    PRIOR AUTHORIZATION DENIED    Medication: GEMTESA 75 MG PO TABS  Insurance Company: [employer] - Phone [phone] Fax [phone]  Denial Date: 10/30/2024  Denial Reason(s):         Appeal Information:         Patient Notified: NO- Unfortunately, we cannot call the patient with denials because we do not know what next steps the MD will take nor can we give medical advice, please notify the patient of what they are to expect for the continuation of their therapy from the provider.

## 2024-11-01 ENCOUNTER — APPOINTMENT (OUTPATIENT)
Dept: INTERPRETER SERVICES | Facility: CLINIC | Age: 54
End: 2024-11-01
Payer: COMMERCIAL

## 2024-11-01 DIAGNOSIS — N39.46 MIXED INCONTINENCE: Primary | ICD-10-CM

## 2024-11-01 RX ORDER — TOLTERODINE 4 MG/1
4 CAPSULE, EXTENDED RELEASE ORAL DAILY
Qty: 30 CAPSULE | Refills: 3 | Status: SHIPPED | OUTPATIENT
Start: 2024-11-01

## 2024-11-01 NOTE — TELEPHONE ENCOUNTER
Called through interpretor  services  informed patient new medication as  been ordered to her [facility]. The original  medication was not covered  by  insurance . She will pick  up today.

## 2024-11-19 ENCOUNTER — THERAPY VISIT (OUTPATIENT)
Dept: PHYSICAL THERAPY | Facility: CLINIC | Age: 54
End: 2024-11-19
Payer: COMMERCIAL

## 2024-11-19 DIAGNOSIS — K59.00 CONSTIPATION, UNSPECIFIED CONSTIPATION TYPE: ICD-10-CM

## 2024-11-19 DIAGNOSIS — N39.46 MIXED INCONTINENCE: Primary | ICD-10-CM

## 2024-11-19 PROCEDURE — 97530 THERAPEUTIC ACTIVITIES: CPT | Mod: GP

## 2024-11-19 PROCEDURE — 97110 THERAPEUTIC EXERCISES: CPT | Mod: GP

## 2024-11-19 PROCEDURE — 97161 PT EVAL LOW COMPLEX 20 MIN: CPT | Mod: GP

## 2024-11-19 NOTE — PROGRESS NOTES
PHYSICAL THERAPY EVALUATION  Type of Visit: Evaluation       Fall Risk Screen:  Fall screen completed by: PT  Have you fallen 2 or more times in the past year?: No  Have you fallen and had an injury in the past year?: Yes  Is patient a fall risk?: No  Fall screen comments: fell on snow    Subjective         Presenting condition or subjective complaint: (Patient-Rptd) leakage, bladder pain    Patient presents to physical therapy for evaluation. Patient reports that she has been experiencing some bladder symptoms and bowel symptoms. She reports that she has very strong urinary urges and increased urinary frequency as well as constipation and pain with bowel movements. She reports that she reports that she feels like she has to rush to the bathroom for urination and bowel movements. She reports that she has to squeeze her legs together to feel like she stops urinary leakage.    Date of onset: 10/30/24 (order date)    Relevant medical history:     Past Medical History:   Diagnosis Date    Arthritis       Dates & types of surgery: (Patient-Rptd) 2018 catheter to remove stone  Past Surgical History:   Procedure Laterality Date    COMBINED CYSTOSCOPY, INSERT STENT URETER(S) Right 08/25/2017    Procedure: COMBINED CYSTOSCOPY, INSERT STENT URETER(S);  Cystoscopy,  Right  Ureteral Stent Placement;  Surgeon: Ousmane Grady MD;  Location:  OR    ESOPHAGOSCOPY, GASTROSCOPY, DUODENOSCOPY (EGD), COMBINED N/A 10/12/2022    Procedure: ESOPHAGOGASTRODUODENOSCOPY, WITH BIOPSY;  Surgeon: Erik Dee MD;  Location:  GI    GYN SURGERY      HYSTERECTOMY  01/01/1995      Prior diagnostic imaging/testing results:       Prior therapy history for the same diagnosis, illness or injury: (Patient-Rptd) Yes      Prior Level of Function  Transfers:   Ambulation:   ADL:   IADL:     Living Environment  Social support: (Patient-Rptd) With a significant other or spouse   Type of home: (Patient-Rptd) House; 1 level; Basement    Stairs to enter the home: (Patient-Rptd) Yes (Patient-Rptd) 10 Is there a railing: (Patient-Rptd) Yes     Ramp: (Patient-Rptd) No   Stairs inside the home: (Patient-Rptd) Yes (Patient-Rptd) 10 Is there a railing: (Patient-Rptd) Yes     Help at home: (Patient-Rptd) None  Equipment owned:       Employment: (Patient-Rptd) Yes (Patient-Rptd) clean rooms  Hobbies/Interests:      Patient goals for therapy:      Pain assessment: Pain denied     Objective      PELVIC EVALUATION  ADDITIONAL HISTORY:  Sex assigned at birth: (Patient-Rptd) Female  Gender identity: (Patient-Rptd) Female    Pronouns: (Patient-Rptd) She/Her Hers      Bladder History:  Feels bladder filling: (Patient-Rptd) Yes  Triggers for feeling of inability to wait to go to the bathroom: (Patient-Rptd) Yes (Patient-Rptd) bladder filling  How long can you wait to urinate: (Patient-Rptd) 30 min  Gets up at night to urinate: (Patient-Rptd) Yes (Patient-Rptd) 5  Can stop the flow of urine when urinating: (Patient-Rptd) No  Volume of urine usually released: (Patient-Rptd) Average   Other issues:    Number of bladder infections in last 12 months:    Fluid intake per day: (Patient-Rptd) 2 bottles, tea      Medications taken for bladder: (Patient-Rptd) No     Activities causing urine leak: (Patient-Rptd) Cough; Sneeze; Jump; Run; Hurrying to the bathroom due to a strong urge to urinate (pee)    Amount of urine typically leaked: (Patient-Rptd) large  Pads used to help with leaking: (Patient-Rptd) No        Bowel History:  Frequency of bowel movement: (Patient-Rptd) every day  Consistency of stool: (Patient-Rptd) Soft    Ignores the urge to defecate: (Patient-Rptd) No  Other bowel issues: (Patient-Rptd) Loss of gas; Straining to have bowel movement  Length of time spent trying to have a bowel movement: (Patient-Rptd) 5 min    Sexual Function History:  Sexual orientation: (Patient-Rptd) Straight    Sexually active: (Patient-Rptd) No  Lubrication used: (Patient-Rptd) No  (Patient-Rptd) No  Pelvic pain:      Pain or difficulty with orgasms/erection/ejaculation: (Patient-Rptd) No    State of menopause: (Patient-Rptd) Post-menopause (I am done with menopause)  Hormone medications: (Patient-Rptd) Yes (Patient-Rptd) estrogen    Are you currently pregnant: (Patient-Rptd) No  Number of previous pregnancies: (Patient-Rptd) 4  Number of deliveries: (Patient-Rptd) 3  If you have delivered before, did you have any of these issues during delivery: (Patient-Rptd) Tearing; Vaginal delivery  Have you been diagnosed with pelvic prolapse or abdominal separation: (Patient-Rptd) No  Do you get regular exercise: (Patient-Rptd) Yes  I do this type of exercise: (Patient-Rptd) walk  Have you tried pelvic floor strengthening exercises for 4 weeks: (Patient-Rptd) No  Do you have any history of trauma that is relevant to your care that you d like to share: (Patient-Rptd) No      Discussed reason for referral regarding pelvic health needs and external/internal pelvic floor muscle examination with patient/guardian.  Opportunity provided to ask questions and verbal consent for assessment and intervention was given.    PAIN: Pain Level at Rest: 0/10  Pain Level with Use: 0/10  Pain Location: no pain reported  Pain Quality: pressure, intestines full of air  Pain Frequency: intermittent  Pain is Worst: not associated with time of day  Pain is Exacerbated By: drinking more fluids  Pain is Relieved By: drinking less fluids  Pain Progression: Worsened    POSTURE: Standing Posture: Rounded shoulders, Forward head, Thoracic kyphosis increased, Genu recurvatum  Sitting Posture: Rounded shoulders, Forward head, Thoracic kyphosis increased  LUMBAR SCREEN: AROM WNL  HIP SCREEN:  Strength: WNL   Functional Strength Testing:     PELVIC/SI SCREEN:     PAIN PROVOCATION TEST:   PELVIS/SI SPECIAL TESTS:   BREATHING SYMMETRY:     PELVIC EXAM  External Visual Inspection:  At rest: Normal  With voluntary pelvic floor contraction:  Perineal elevation, Present, very minimal  Relaxation of PFM: Yes  With intra-abdominal pressure: Cough: Perineal descent    Integumentary:   Introitus: Atrophy    External Digital Palpation per Perineum:   Ischiocavernosis: Tenderness  Bulbo cavernosis: Unremarkable  Transverse perineal: Unremarkable  Levator ani: Unremarkable  Perineal body: Unremarkable  Public symphysis: Unremarkable    Scar:   Location/Type:   Mobility:     Internal Digital Palpation:  Per Vagina:  Patient reports burning sensation at first layer of internal pelvic floor palpation. She reports pain intermittently around the pelvic clock on posterior wall of vagina and underneath pubic symphysis.  Tenderness  Myofascial Resistance to Palpation: Soft  Digital Muscle Performance: P (Power): 1  E (Endurance): difficulty to identify as trace muscle contraction was difficult to palpate  R (Repetitions): 3  F (Fast Twitch): 10 performed, decreased muscle excursion with increased number of repetitions  Compensations: Adductors, Gluts  Relaxation Post-Contraction: Normal    Per Rectum:        Pelvic Organ Prolapse:       ABDOMINAL ASSESSMENT  Diastasis Rectus Abdominis (SANJEEV):      Abdominal Activation/Strength:     Scar:   Location/Type:   Mobility:     Fascial Tension/Restriction:     BIOFEEDBACK:  Position:   Surface Electrodes:     Abdominals:     Perianals:       DERMATOMES:   DTR S:     Assessment & Plan   CLINICAL IMPRESSIONS  Medical Diagnosis: mixed incontinence; constipation, unspecified constipation type    Treatment Diagnosis: pelvic floor muscle weakness, pelvic floor muscle incoordination   Impression/Assessment: Patient is a 54 year old female with pelvic floor muscle weakness, pelvic floor muscle incoordination, urinary incontinence, bowel dysfunction complaints.  The following significant findings have been identified: Pain, Decreased ROM/flexibility, Decreased strength, Decreased proprioception, Impaired muscle performance, and  Decreased activity tolerance. These impairments interfere with their ability to perform self care tasks, work tasks, recreational activities, household chores, household mobility, and community mobility as compared to previous level of function.     Clinical Decision Making (Complexity):  Clinical Presentation: Stable/Uncomplicated  Clinical Presentation Rationale: based on medical and personal factors listed in PT evaluation  Clinical Decision Making (Complexity): Low complexity    PLAN OF CARE  Treatment Interventions:  Modalities: Biofeedback, Ultrasound  Interventions: Manual Therapy, Neuromuscular Re-education, Therapeutic Activity, Therapeutic Exercise, Self-Care/Home Management    Long Term Goals     PT Goal 1  Goal Identifier: LTG1  Goal Description: Patient will report zero episodes of urinary incontinence for two consecutive weeks to demonstrate improvements in pelvic floor muscle strength.  Rationale: to maximize safety and independence with performance of ADLs and functional tasks;to maximize safety and independence within the home;to maximize safety and independence within the community;to maximize safety and independence with self cares  Target Date: 02/16/25  PT Goal 2  Goal Identifier: LTG2  Goal Description: Patient will report at least 1 bowel movement per day of type 4-5 stool without straining to demonstrate improved pelvic muscle coordination with bowel movements.  Rationale: to maximize safety and independence within the home;to maximize safety and independence with performance of ADLs and functional tasks;to maximize safety and independence with self cares  Target Date: 02/16/25      Frequency of Treatment: 1x per week for 3 weeks, 1x every other week for 9 weeks  Duration of Treatment: 12 weeks    Recommended Referrals to Other Professionals:   Education Assessment:   Learner/Method: Patient  Education Comments: language may pose barrier to learning    Risks and benefits of  evaluation/treatment have been explained.   Patient/Family/caregiver agrees with Plan of Care.     Evaluation Time:     PT Eval, Low Complexity Minutes (04625): 30       Signing Clinician: aMn Garcia, PT        [facility]                                                                                   OUTPATIENT PHYSICAL THERAPY      PLAN OF TREATMENT FOR OUTPATIENT REHABILITATION   Patient's Last Name, First Name, Ayesha Mclaughlin YOB: 1970   Provider's Name   [facility]   Medical Record No.  [id number]     Onset Date: 10/30/24 (order date)  Start of Care Date: 11/19/24     Medical Diagnosis:  mixed incontinence; constipation, unspecified constipation type      PT Treatment Diagnosis:  pelvic floor muscle weakness, pelvic floor muscle incoordination Plan of Treatment  Frequency/Duration: 1x per week for 3 weeks, 1x every other week for 9 weeks/ 12 weeks    Certification date from 11/19/24 to 02/16/25         See note for plan of treatment details and functional goals     Man Garcia, PT                         I CERTIFY THE NEED FOR THESE SERVICES FURNISHED UNDER        THIS PLAN OF TREATMENT AND WHILE UNDER MY CARE     (Physician attestation of this document indicates review and certification of the therapy plan).              Referring Provider:  Sara Wilson    Initial Assessment  See Epic Evaluation- Start of Care Date: 11/19/24

## 2024-12-18 ENCOUNTER — OFFICE VISIT (OUTPATIENT)
Dept: URGENT CARE | Facility: URGENT CARE | Age: 54
End: 2024-12-18
Payer: COMMERCIAL

## 2024-12-18 ENCOUNTER — ANCILLARY PROCEDURE (OUTPATIENT)
Dept: GENERAL RADIOLOGY | Facility: CLINIC | Age: 54
End: 2024-12-18
Attending: PHYSICIAN ASSISTANT
Payer: COMMERCIAL

## 2024-12-18 VITALS
SYSTOLIC BLOOD PRESSURE: 124 MMHG | OXYGEN SATURATION: 98 % | DIASTOLIC BLOOD PRESSURE: 80 MMHG | WEIGHT: 209 LBS | RESPIRATION RATE: 20 BRPM | HEART RATE: 64 BPM | BODY MASS INDEX: 35.86 KG/M2 | TEMPERATURE: 98.2 F

## 2024-12-18 DIAGNOSIS — M79.672 PAIN OF LEFT HEEL: Primary | ICD-10-CM

## 2024-12-18 DIAGNOSIS — G89.29 CHRONIC RIGHT SHOULDER PAIN: ICD-10-CM

## 2024-12-18 DIAGNOSIS — M79.672 PAIN OF LEFT HEEL: ICD-10-CM

## 2024-12-18 DIAGNOSIS — M25.511 CHRONIC RIGHT SHOULDER PAIN: ICD-10-CM

## 2024-12-18 PROCEDURE — 99215 OFFICE O/P EST HI 40 MIN: CPT | Performed by: PHYSICIAN ASSISTANT

## 2024-12-18 PROCEDURE — 73650 X-RAY EXAM OF HEEL: CPT | Mod: TC | Performed by: RADIOLOGY

## 2024-12-18 PROCEDURE — 73030 X-RAY EXAM OF SHOULDER: CPT | Mod: TC | Performed by: RADIOLOGY

## 2024-12-18 RX ORDER — OMEPRAZOLE 20 MG/1
20 TABLET, DELAYED RELEASE ORAL DAILY
COMMUNITY

## 2024-12-18 NOTE — PROGRESS NOTES
SUBJECTIVE:  Chief Complaint   Patient presents with    Musculoskeletal Problem     Left ankle pain near heel x year.  Hit with a door at work in right shoulder 3 years ago, still have pain at times.       Ayesha Pereyra is a 54 year old female presents with a chief complaint of left ankle/heel.  The injury occurred 1 year(s) ago. Woresned last 1 week.    SUBJECTIVE:  Chief Complaint   Patient presents with    Musculoskeletal Problem     Left ankle pain near heel x year.  Hit with a door at work in right shoulder 3 years ago, still have pain at times.       Ayesha Pereyra is a 54 year old female presents with a chief complaint of right shoulder pain.  The injury occurred 3 year(s) ago.       Past Medical History:   Diagnosis Date    Arthritis      Current Outpatient Medications   Medication Sig Dispense Refill    acetaminophen (TYLENOL) 500 MG tablet Take 1-2 tablets by mouth every 6 hours as needed for pain. 1 tablet 0    estradiol (ESTRACE) 0.1 MG/GM vaginal cream Place 2 g vaginally twice a week. 40 g 5    naproxen (NAPROSYN) 500 MG tablet Take 1 tablet (500 mg) by mouth 2 times daily (with meals) 30 tablet 3    omeprazole (PRILOSEC OTC) 20 MG EC tablet Take 20 mg by mouth daily.      tolterodine ER (DETROL LA) 4 MG 24 hr capsule Take 1 capsule (4 mg) by mouth daily. (Patient not taking: Reported on 12/18/2024) 30 capsule 3    vibegron (GEMTESA) 75 MG TABS tablet Take 1 tablet (75 mg) by mouth daily. (Patient not taking: Reported on 12/18/2024) 90 tablet 3     Social History     Tobacco Use    Smoking status: Never    Smokeless tobacco: Never   Substance Use Topics    Alcohol use: No       ROS:  Review of systems negative except as stated above.    EXAM:   /80 (BP Location: Right arm, Patient Position: Sitting, Cuff Size: Adult Large)   Pulse 64   Temp 98.2  F (36.8  C) (Tympanic)   Resp 20   Wt 94.8 kg (209 lb)   SpO2 98%   BMI 35.86 kg/m    Gen: healthy,alert,no distress  Shoulder: point tender  at anterior joint, biceps intact  Ankle: point tender at insertion  GENERAL APPEARANCE: healthy, alert and no distress  CHEST: clear to auscultation  CV: regular rate and rhythm  EXTREMITIES: peripheral pulses normal  MS: no gross deformities noted, no evidence of inflammation in joints, FROM in all extremities.  SKIN: no suspicious lesions or rashes  NEURO: Normal strength and tone, sensory exam grossly normal, mentation intact and speech normal    Results for orders placed or performed in visit on 12/18/24   XR Shoulder Right G/E 3 Views     Status: None    Narrative    XR SHOULDER RIGHT G/E 3 VIEWS 12/18/2024 3:29 PM    HISTORY: Chronic right shoulder pain; Chronic right shoulder pain    COMPARISON: None.      Impression    IMPRESSION: No fracture or dislocation. No degenerative changes.    RICKY SPENCER MD         SYSTEM ID:  [id number]   XR Calcaneus Left G/E 2 Views     Status: None    Narrative    XR CALCANEUS LEFT G/E 2 VIEWS 12/18/2024 3:29 PM    HISTORY: Pain of left heel    COMPARISON: None.      Impression    IMPRESSION: No fracture. Small plantar and Achilles heel spurs. Small  ossicle in the distal Achilles tendon.    RICKY SPENCER MD         SYSTEM ID:  [id number]         ASSESSMENT:   (M79.672) Pain of left heel  (primary encounter diagnosis)  Plan: XR Calcaneus Left G/E 2 Views, Ankle/Foot         Bracing Supplies Order Walking Boot; Left;         Pneumatic; Tall, Crutches Order for DME - ONLY         FOR DME, Orthopedic  Referral    Non-weight bearing, follow up this week    (M25.511,  G89.29) Chronic right shoulder pain  Plan: XR Shoulder Right G/E 3 Views, Orthopedic          Referral          60  minute visit with Patient. Spent by me on the date of the encounter doing review of test results, interpretation of tests, patient visit, documentation, communication with other provider.  services utilized for all communication during visit.

## 2024-12-20 ENCOUNTER — APPOINTMENT (OUTPATIENT)
Dept: INTERPRETER SERVICES | Facility: CLINIC | Age: 54
End: 2024-12-20
Payer: COMMERCIAL

## 2025-05-23 PROBLEM — N39.46 MIXED INCONTINENCE: Status: RESOLVED | Noted: 2024-11-19 | Resolved: 2025-05-23

## 2025-05-23 PROBLEM — K59.00 CONSTIPATION, UNSPECIFIED CONSTIPATION TYPE: Status: RESOLVED | Noted: 2024-11-19 | Resolved: 2025-05-23

## 2025-05-28 ENCOUNTER — OFFICE VISIT (OUTPATIENT)
Dept: PODIATRY | Facility: CLINIC | Age: 55
End: 2025-05-28
Attending: PHYSICIAN ASSISTANT
Payer: COMMERCIAL

## 2025-05-28 VITALS — HEIGHT: 64 IN | BODY MASS INDEX: 35.68 KG/M2 | WEIGHT: 209 LBS

## 2025-05-28 DIAGNOSIS — G89.29 CHRONIC PAIN OF LEFT KNEE: ICD-10-CM

## 2025-05-28 DIAGNOSIS — M25.562 CHRONIC PAIN OF LEFT KNEE: ICD-10-CM

## 2025-05-28 DIAGNOSIS — M79.672 PAIN OF LEFT HEEL: ICD-10-CM

## 2025-05-28 DIAGNOSIS — M76.62 TENDONITIS, ACHILLES, LEFT: Primary | ICD-10-CM

## 2025-05-28 DIAGNOSIS — M79.89 MASS OF SOFT TISSUE OF FOOT: ICD-10-CM

## 2025-05-28 PROCEDURE — 99203 OFFICE O/P NEW LOW 30 MIN: CPT | Performed by: PODIATRIST

## 2025-05-28 NOTE — PATIENT INSTRUCTIONS
Thank you for choosing [facility] Podiat[facility] / Foot & Ankle Surgery!    DR. RUSS'S CLINIC LOCATIONS:     [facility] TRIAGE LINE: [phone]   [address] APPOINTMENTS: [phone]   [place] [address] RADIOLOGY: [phone]   (Every other Tues - Wed - Fri PM) SET UP SURGERY: [phone]    PHYSICAL THERAPY: [phone]   [place] SPECIALTY BILLING QUESTIONS: [phone] [address] FAX: [phone]   [place] [address]    (Thurs & Fri AM)         ACHILLES TENDONITIS  Therapies discussed today:  1.  Supportive shoes, minimizing barefoot ambulation - helps to provide cushion, padding and support to the tendon that is inflamed.  Socks, flip flops, flats and some slippers are not typically sufficient to provide support, although choose shoe gear based on comfort.  Shoes should be worn even in-doors  2.  Insert/orthotics - inserts/orthotics that have an arch support and heel lift built in to them provide further stress relief for the tendon.  3. Icing - applying ice to the back of the heel can help to alleviate discomfort.  Apply ice to the area every few hours x 10-15 minutes as needed based on pain levels.  4.  Anti-inflammatory(NSAIDS)/Tylenol - anti-inflammatories, such as ibuprofen or Aleve, as well as Tylenol can be used to help decrease symptoms and improve pain levels.  If you have high blood pressure, heart disease, stomach or kidney problems, use anti-inflammatories sparingly.  Tylenol should not be used if you have liver problems.  If you can safely taken them, you can use NSAIDS and tylenol in combination for pain relief  5. Activity modifications - if there are certain things that you do, whether it's going barefoot or certain shoes/activities, you should try to minimize those activities as much as possible until your symptoms are sufficiently resolved.  Certainly, some activities, such as running on the treadmill, are easier to take a break from versus others,  "such as work or chores at home.  \"If there are certain activities that hurt your heel, and you keep doing those activities that hurt your heel, your heel will keep hurting\".    6.  Stretching - Stretching your Achilles and hamstring can help to decrease stress on the Achilles tendon.               Hold each stretch for 10 seconds.  Stretch 10 times per set, three sets per day.  Morning, afternoon and evening.  If your heel pain is very severe in the morning, consider doing the first set of stretches before you get out of bed.      *If these initial therapies are insufficient, we have our tier 2 therapies that can more aggressively work to improve your symptoms and get you back to the activities that you enjoy.       Fibroma  The cause and nature of plantar fibromas was discussed.  Although typically a clinical diagnosis, I told the patient that an MRI can be ordered if he/she wants more characterization of the mass. .      We discussed appropriate shoes and the option of accommodative inserts.  Surgical excision is not recommended as a first-line treatment, if the patient finds conservative options satisfactory for pain reduction.    Nonetheless, I explained that surgical excision has provided some of my patients long-term symptom relief, but the lesion can return.     If the patient ultimately chooses surgery, due to persistent pain and failure of conservative treatment, 4 weeks of non-weight bearing might be needed.  A return to clinic for a more detailed discussion about the surgery, risks, benefits and post operative course will be required.     "

## 2025-05-28 NOTE — LETTER
5/28/2025      Ayesha Pereyra  [address]  [facility] [place] [facility] [place] [address]      Dear Colleague,    Thank you for referring your patient, Ayesha Pereyra, to the [facility]. Please see a copy of my visit note below.    ASSESSMENT:  Encounter Diagnoses   Name Primary?     Tendonitis, Achilles, left Yes     Pain of left heel      Mass of soft tissue of left foot      Chronic pain of left knee      MEDICAL DECISION MAKING:  Her chief complaint is the ankle pain which is consistent with left Achilles tendinitis.  Palpatory exam does not suggest any significant tendon disease or associated bony spurring.  Conservative recommendations were reviewed including Tri-Lock bracing, good arch support, a referral to physical therapy.  She said a friend had the same problem which was cured with surgery.  I explained that surgery, in my hands, would be the last resort.    Her other concern is the painful soft tissue mass on the plantar aspect of the left foot.  Palpatory exam and location is consistent with a plantar fibroma.  This is causing pain and she would like it removed.  I think surgical excision is reasonable, as this would also provide definitive diagnosis.  Due to need for interpretation today and multiple complaints, we did not have time to fully address the surgical procedure and postoperative course.  She will be asked to return to clinic for a more detailed discussion closer to the timing of surgery.    Left knee pain -Ortho  referral    I also suggested custom orthoses.  She says she would not tolerate these due to her sciatica.  I am not sure why this would be.    Disclaimer: This note consists of symbols derived from keyboarding, dictation and/or voice recognition software. As a result, there may be errors in the script that have gone undetected. Please consider this when interpreting information found in this chart.    Zeyad Betts, FLORIN, FACFAS, [facility]  "Department of Podiatry/Foot & Ankle Surgery      ____________________________________________________________________    HPI:         Ayesah Pereyra presents today with right ankle pain.  She specifies the Achilles region.  She has dealt with this for 8 months.  Pain is rated an 8 out of 10 at worst.  More pain when cold and with weightbearing activities.  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  Ayesha also reports a more recent development of a painful bump on the plantar aspect of her left foot.  She is reporting left knee pain.  She also has sciatica    *  Past Medical History:   Diagnosis Date     Arthritis    *  *  Past Surgical History:   Procedure Laterality Date     COMBINED CYSTOSCOPY, INSERT STENT URETER(S) Right 08/25/2017    Procedure: COMBINED CYSTOSCOPY, INSERT STENT URETER(S);  Cystoscopy,  Right  Ureteral Stent Placement;  Surgeon: Ousmane Grady MD;  Location:  OR     ESOPHAGOSCOPY, GASTROSCOPY, DUODENOSCOPY (EGD), COMBINED N/A 10/12/2022    Procedure: ESOPHAGOGASTRODUODENOSCOPY, WITH BIOPSY;  Surgeon: Erik Dee MD;  Location:  GI     GYN SURGERY       HYSTERECTOMY  01/01/1995   *  *  Current Outpatient Medications   Medication Sig Dispense Refill     acetaminophen (TYLENOL) 500 MG tablet Take 1-2 tablets by mouth every 6 hours as needed for pain. 1 tablet 0     estradiol (ESTRACE) 0.1 MG/GM vaginal cream Place 2 g vaginally twice a week. 40 g 5     omeprazole (PRILOSEC OTC) 20 MG EC tablet Take 20 mg by mouth daily.       tolterodine ER (DETROL LA) 4 MG 24 hr capsule Take 1 capsule (4 mg) by mouth daily. (Patient not taking: Reported on 5/28/2025) 30 capsule 3     vibegron (GEMTESA) 75 MG TABS tablet Take 1 tablet (75 mg) by mouth daily. (Patient not taking: Reported on 5/28/2025) 90 tablet 3         EXAM:    Vitals: Ht 1.626 m (5' 4\")   Wt 94.8 kg (209 lb)   BMI 35.87 kg/m    BMI: Body mass index is 35.87 kg/m .  Vasc:      Pedal pulses " are palpable for the dorsalis pedis posterior tibial artery, bilateral foot.  Capillary fill time </= 3 seconds  Pedal skin appears well-perfused  Neuro:      Light touch sensation intact to all sensory nerve distributions, bilateral foot.  No apparent spastic contractures or other deformity secondary to neurologic compromise.  Derm:      No calluses  No wounds   No worrisome lesions  MSK:      There is a subcutaneous lump along the plantar fascial band just behind the first metatarsal head on the left.  Pain on palpation to the left Achilles tendon.  No palpable attenuation or fusiform swelling noted.  No pain with forceful plantarflexion of the ankle against resistance.  She does have some Achilles pain with passive stretch.  Bilateral lower extremity muscle strength presents is normal.  No gross deformities  Adequate ankle and subtalar joint range of motion  Calf:    Neg for redness, swelling or tenderness          Again, thank you for allowing me to participate in the care of your patient.        Sincerely,        Zeyad Betts DPM    Electronically signed

## 2025-05-28 NOTE — PROGRESS NOTES
ASSESSMENT:  Encounter Diagnoses   Name Primary?    Tendonitis, Achilles, left Yes    Pain of left heel     Mass of soft tissue of left foot     Chronic pain of left knee      MEDICAL DECISION MAKING:  Her chief complaint is the ankle pain which is consistent with left Achilles tendinitis.  Palpatory exam does not suggest any significant tendon disease or associated bony spurring.  Conservative recommendations were reviewed including Tri-Lock bracing, good arch support, a referral to physical therapy.  She said a friend had the same problem which was cured with surgery.  I explained that surgery, in my hands, would be the last resort.    Her other concern is the painful soft tissue mass on the plantar aspect of the left foot.  Palpatory exam and location is consistent with a plantar fibroma.  This is causing pain and she would like it removed.  I think surgical excision is reasonable, as this would also provide definitive diagnosis.  Due to need for interpretation today and multiple complaints, we did not have time to fully address the surgical procedure and postoperative course.  She will be asked to return to clinic for a more detailed discussion closer to the timing of surgery.    Left knee pain -Ortho  referral    I also suggested custom orthoses.  She says she would not tolerate these due to her sciatica.  I am not sure why this would be.    Disclaimer: This note consists of symbols derived from keyboarding, dictation and/or voice recognition software. As a result, there may be errors in the script that have gone undetected. Please consider this when interpreting information found in this chart.    Zeyad Betts DPM, FACFAS, MS    [place] Department of Podiatry/Foot & Ankle Surgery      ____________________________________________________________________    HPI:         Ayesha Bakerovia presents today with right ankle pain.  She specifies the Achilles region.  She has dealt with this for 8  "months.  Pain is rated an 8 out of 10 at worst.  More pain when cold and with weightbearing activities.  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  Ayesha also reports a more recent development of a painful bump on the plantar aspect of her left foot.  She is reporting left knee pain.  She also has sciatica    *  Past Medical History:   Diagnosis Date    Arthritis    *  *  Past Surgical History:   Procedure Laterality Date    COMBINED CYSTOSCOPY, INSERT STENT URETER(S) Right 08/25/2017    Procedure: COMBINED CYSTOSCOPY, INSERT STENT URETER(S);  Cystoscopy,  Right  Ureteral Stent Placement;  Surgeon: Ousmane Grady MD;  Location:  OR    ESOPHAGOSCOPY, GASTROSCOPY, DUODENOSCOPY (EGD), COMBINED N/A 10/12/2022    Procedure: ESOPHAGOGASTRODUODENOSCOPY, WITH BIOPSY;  Surgeon: Erik Dee MD;  Location:  GI    GYN SURGERY      HYSTERECTOMY  01/01/1995   *  *  Current Outpatient Medications   Medication Sig Dispense Refill    acetaminophen (TYLENOL) 500 MG tablet Take 1-2 tablets by mouth every 6 hours as needed for pain. 1 tablet 0    estradiol (ESTRACE) 0.1 MG/GM vaginal cream Place 2 g vaginally twice a week. 40 g 5    omeprazole (PRILOSEC OTC) 20 MG EC tablet Take 20 mg by mouth daily.      tolterodine ER (DETROL LA) 4 MG 24 hr capsule Take 1 capsule (4 mg) by mouth daily. (Patient not taking: Reported on 5/28/2025) 30 capsule 3    vibegron (GEMTESA) 75 MG TABS tablet Take 1 tablet (75 mg) by mouth daily. (Patient not taking: Reported on 5/28/2025) 90 tablet 3         EXAM:    Vitals: Ht 1.626 m (5' 4\")   Wt 94.8 kg (209 lb)   BMI 35.87 kg/m    BMI: Body mass index is 35.87 kg/m .  Vasc:      Pedal pulses are palpable for the dorsalis pedis posterior tibial artery, bilateral foot.  Capillary fill time </= 3 seconds  Pedal skin appears well-perfused  Neuro:      Light touch sensation intact to all sensory nerve distributions, bilateral foot.  No apparent spastic " contractures or other deformity secondary to neurologic compromise.  Derm:      No calluses  No wounds   No worrisome lesions  MSK:      There is a subcutaneous lump along the plantar fascial band just behind the first metatarsal head on the left.  Pain on palpation to the left Achilles tendon.  No palpable attenuation or fusiform swelling noted.  No pain with forceful plantarflexion of the ankle against resistance.  She does have some Achilles pain with passive stretch.  Bilateral lower extremity muscle strength presents is normal.  No gross deformities  Adequate ankle and subtalar joint range of motion  Calf:    Neg for redness, swelling or tenderness

## 2025-06-02 ENCOUNTER — TELEPHONE (OUTPATIENT)
Dept: PODIATRY | Facility: CLINIC | Age: 55
End: 2025-06-02
Payer: COMMERCIAL

## 2025-06-02 NOTE — TELEPHONE ENCOUNTER
1st attempt  Tried to call patient, with [facility] [phone], a gentleman answered the phone and then the phone went dead.

## 2025-06-02 NOTE — PROGRESS NOTES
PHYSICAL THERAPY EVALUATION  Type of Visit: Evaluation       Fall Risk Screen:  Have you fallen 2 or more times in the past year?: Yes  Have you fallen and had an injury in the past year?: Yes  Timed Up and Go score (seconds): 11  Is patient receiving Physical Therapy Services?: Yes    Subjective  Pt. complains of left Achilles that has been present for greater than 3 months.  Mechanism/History of injury/symptoms: Unknown cause however she suspects it may be from being on her feet a lot and walking.   Patient s chief complaints: leftAchilles pain.  Symptoms are exacerbated by prolonged walking and standing.  Symptoms are relieved by rest.   Current function restrictions: Prolonged standing and walking.  Previous functional status as reported by patient: Unlimited.          Presenting condition or subjective complaint: back upper part of the ankle hurts  Date of onset: 03/03/25    Relevant medical history:     Dates & types of surgery: kidney stones    Prior diagnostic imaging/testing results: X-ray     Prior therapy history for the same diagnosis, illness or injury: No      Prior Level of Function  Transfers: Independent  Ambulation: Independent  ADL: Independent  IADL:     Living Environment  Social support: With a significant other or spouse   Type of home: House   Stairs to enter the home: Yes 1 Is there a railing: No     Ramp: No   Stairs inside the home: Yes 1 Is there a railing: Yes     Help at home:    Equipment owned:       Employment: Yes house keeping at a [employer]  Hobbies/Interests: walking    Patient goals for therapy: house choirs    Pain assessment: Pain present 5/10     Objective   Bilateral active and passive ankle range of motion within normal limits and symmetrical  Palpable tenderness evident on left distal Achilles  Palpable nodule/hypertrophy evident on left distal Achilles  Tight bilateral gastroc and soleus  Bilateral ankle strength 4 out of 5 generally for all movements    Assessment & Plan    CLINICAL IMPRESSIONS  Medical Diagnosis: R Achilles pain    Treatment Diagnosis: R Achilles pain   Impression/Assessment: Patient is a 54 year old female with left ankle pain complaints.  The following significant findings have been identified: Pain, Decreased ROM/flexibility, Decreased strength, Impaired muscle performance, and Decreased activity tolerance. These impairments interfere with their ability to perform self care tasks, recreational activities, and household chores as compared to previous level of function.     Clinical Decision Making (Complexity):  Clinical Presentation: Stable/Uncomplicated  Clinical Presentation Rationale: based on medical and personal factors listed in PT evaluation  Clinical Decision Making (Complexity): Low complexity    PLAN OF CARE  Treatment Interventions:  Interventions: Manual Therapy, Neuromuscular Re-education, Therapeutic Activity, Therapeutic Exercise    Long Term Goals     PT Goal 1  Goal Description: Pt will be able to tolerate walking for 30 minutes  Rationale: to maximize safety and independence with performance of ADLs and functional tasks;to maximize safety and independence within the home;to maximize safety and independence within the community  Target Date: 07/29/25      Frequency of Treatment: 1 x week  Duration of Treatment: 8 week    Recommended Referrals to Other Professionals: Physical Therapy  Education Assessment:   Learner/Method: Patient;Listening;Demonstration;Pictures/Video  Education Comments: Pt educated on plan of care    Risks and benefits of evaluation/treatment have been explained.   Patient/Family/caregiver agrees with Plan of Care.     Evaluation Time:     PT Eval, Low Complexity Minutes (85288): 15  Evaluation Only     Signing Clinician: Erik Dobbs PT        [facility]                                                                                   OUTPATIENT PHYSICAL THERAPY      PLAN OF TREATMENT FOR  OUTPATIENT REHABILITATION   Patient's Last Name, First Name, Ayesha Mclaughlin YOB: 1970   Provider's Name   [facility]   Medical Record No.  [id number]     Onset Date: 03/03/25  Start of Care Date: 06/03/25     Medical Diagnosis:  R Achilles pain      PT Treatment Diagnosis:  R Achilles pain Plan of Treatment  Frequency/Duration: 1 x week/ 8 week    Certification date from 06/03/25 to 07/29/25         See note for plan of treatment details and functional goals     Erik Dobbs, PT                         I CERTIFY THE NEED FOR THESE SERVICES FURNISHED UNDER        THIS PLAN OF TREATMENT AND WHILE UNDER MY CARE     (Physician attestation of this document indicates review and certification of the therapy plan).              Referring Provider:  Zeyad Betts    Initial Assessment  See Epic Evaluation- Start of Care Date: 06/03/25

## 2025-06-03 ENCOUNTER — THERAPY VISIT (OUTPATIENT)
Dept: PHYSICAL THERAPY | Facility: CLINIC | Age: 55
End: 2025-06-03
Attending: PODIATRIST
Payer: COMMERCIAL

## 2025-06-03 DIAGNOSIS — M76.62 TENDONITIS, ACHILLES, LEFT: ICD-10-CM

## 2025-06-03 DIAGNOSIS — G89.29 CHRONIC PAIN OF LEFT ANKLE: Primary | ICD-10-CM

## 2025-06-03 DIAGNOSIS — M25.572 CHRONIC PAIN OF LEFT ANKLE: Primary | ICD-10-CM

## 2025-06-03 DIAGNOSIS — M79.672 PAIN OF LEFT HEEL: ICD-10-CM

## 2025-06-03 PROCEDURE — 97110 THERAPEUTIC EXERCISES: CPT | Mod: GP | Performed by: PHYSICAL THERAPIST

## 2025-06-03 PROCEDURE — 97161 PT EVAL LOW COMPLEX 20 MIN: CPT | Mod: GP | Performed by: PHYSICAL THERAPIST

## 2025-06-03 ASSESSMENT — ACTIVITIES OF DAILY LIVING (ADL)
LEFS_SCORE(%): 0
SITTING_FOR_1_HOUR: A LITTLE BIT OF DIFFICULTY
PLEASE_INDICATE_YOR_PRIMARY_REASON_FOR_REFERRAL_TO_THERAPY:: FOOT AND/OR ANKLE
SQUATTING: EXTREME DIFFICULTY OR UNABLE TO PERFORM ACTIVITY
GETTING_INTO_OR_OUT_OF_A_CAR: QUITE A BIT OF DIFFICULTY
WALKING_A_MILE: QUITE A BIT OF DIFFICULTY
WALKING_BETWEEN_ROOMS: QUITE A BIT OF DIFFICULTY
GETTING_INTO_AND_OUT_OF_A_BATH: QUITE A BIT OF DIFFICULTY
MAKING_SHARP_TURNS_WHILE_RUNNING_FAST: A LITTLE BIT OF DIFFICULTY
PERFORMING_HEAVY_ACTIVITIES_AROUND_YOUR_HOME: EXTREME DIFFICULTY OR UNABLE TO PERFORM ACTIVITY
RUNNING_ON_EVEN_GROUND: EXTREME DIFFICULTY OR UNABLE TO PERFORM ACTIVITY
WALKING_2_BLOCKS: QUITE A BIT OF DIFFICULTY
RUNNING_ON_UNEVEN_GROUND: EXTREME DIFFICULTY OR UNABLE TO PERFORM ACTIVITY
ANY_OF_YOUR_USUAL_WORK,_HOUSEWORK_OR_SCHOOL_ACTIVITIES: QUITE A BIT OF DIFFICULTY
STANDING_FOR_1_HOUR: EXTREME DIFFICULTY OR UNABLE TO PERFORM ACTIVITY
ROLLING_OVER_IN_BED: EXTREME DIFFICULTY OR UNABLE TO PERFORM ACTIVITY
PUTTING_ON_YOUR_SHOES_OR_SOCKS: EXTREME DIFFICULTY OR UNABLE TO PERFORM ACTIVITY
YOUR_USUAL_HOBBIES,_RECREATIONAL_OR_SPORTING_ACTIVITIES: QUITE A BIT OF DIFFICULTY
LIFTING_AN_OBJECT,_LIKE_A_BAG_OF_GROCERIES_FROM_THE_FLOOR: EXTREME DIFFICULTY OR UNABLE TO PERFORM ACTIVITY
GOING_UP_OR_DOWN_10_STAIRS: EXTREME DIFFICULTY OR UNABLE TO PERFORM ACTIVITY
LEFS_RAW_SCORE: 0
PERFORMING_LIGHT_ACTIVITIES_AROUND_YOUR_HOME: EXTREME DIFFICULTY OR UNABLE TO PERFORM ACTIVITY
SHOPPING: EXTREME DIFFICULTY OR UNABLE TO PERFORM ACTIVITY

## 2025-06-12 ENCOUNTER — ANCILLARY PROCEDURE (OUTPATIENT)
Dept: GENERAL RADIOLOGY | Facility: CLINIC | Age: 55
End: 2025-06-12
Attending: FAMILY MEDICINE
Payer: COMMERCIAL

## 2025-06-12 ENCOUNTER — OFFICE VISIT (OUTPATIENT)
Dept: ORTHOPEDICS | Facility: CLINIC | Age: 55
End: 2025-06-12
Attending: PODIATRIST
Payer: COMMERCIAL

## 2025-06-12 DIAGNOSIS — G89.29 CHRONIC PAIN OF BOTH KNEES: ICD-10-CM

## 2025-06-12 DIAGNOSIS — M25.561 BILATERAL KNEE PAIN: ICD-10-CM

## 2025-06-12 DIAGNOSIS — M25.561 CHRONIC PAIN OF BOTH KNEES: ICD-10-CM

## 2025-06-12 DIAGNOSIS — M17.0 BILATERAL PRIMARY OSTEOARTHRITIS OF KNEE: Primary | ICD-10-CM

## 2025-06-12 DIAGNOSIS — M25.562 CHRONIC PAIN OF BOTH KNEES: ICD-10-CM

## 2025-06-12 DIAGNOSIS — M25.562 BILATERAL KNEE PAIN: ICD-10-CM

## 2025-06-12 PROCEDURE — 73562 X-RAY EXAM OF KNEE 3: CPT | Mod: TC | Performed by: STUDENT IN AN ORGANIZED HEALTH CARE EDUCATION/TRAINING PROGRAM

## 2025-06-12 RX ORDER — MELOXICAM 15 MG/1
15 TABLET ORAL DAILY
Qty: 30 TABLET | Refills: 1 | Status: SHIPPED | OUTPATIENT
Start: 2025-06-12

## 2025-06-12 NOTE — PROGRESS NOTES
CHIEF COMPLAINT:  Pain of the Left Knee     HISTORY OF PRESENT ILLNESS  Ms. Pereyra is a pleasant 54 year old year old female who presents to clinic today with bilateral knee pain.  Ayesha explains that she's had left knee pain for 3 months, chronically on the right as it relates to diagnosed knee osteoarthritis after evaluation in November 2023. She states that in December 2024 she had been wearing a walking boot for another issue and she believes she was compensating with the way she was walking. She is also starting to notice right knee pain as well. Pain is located over the anterior knees.  Pain is worse with lying down, making beds, bending down to clean. She works as a .     Onset: gradual  Location: bilateral knee anterior aspects  Quality:  aching  Duration: 3 months   Severity: 10/10 at worst  Timing:intermittent episodes   Modifying factors:  resting and non-use makes it better, movement and use makes it worse  Associated signs & symptoms: pain  Previous similar pain: No  Treatments to date:Tylenol, massage helped in 2023 after 9/28 sports med consult at [facility].  She also underwent knee corticosteroid injection right knee.    Additional history: as documented    Review of Systems:  Have you recently had a a fever, chills, weight loss? No  Do you have any vision problems? No  Do you have any chest pain or edema? No  Do you have any shortness of breath or wheezing?  No  Do you have stomach problems? No  Do you have any numbness or focal weakness? No  Do you have diabetes? No  Do you have problems with bleeding or clotting? No  Do you have an rashes or other skin lesions? No    MEDICAL HISTORY  Patient Active Problem List   Diagnosis    Right ureteral stone    Pyelonephritis    Chronic pain of left ankle       Current Outpatient Medications   Medication Sig Dispense Refill    acetaminophen (TYLENOL) 500 MG tablet Take 1-2 tablets by mouth every 6 hours as needed for pain. 1 tablet 0    estradiol  (ESTRACE) 0.1 MG/GM vaginal cream Place 2 g vaginally twice a week. 40 g 5    omeprazole (PRILOSEC OTC) 20 MG EC tablet Take 20 mg by mouth daily.      tolterodine ER (DETROL LA) 4 MG 24 hr capsule Take 1 capsule (4 mg) by mouth daily. (Patient not taking: Reported on 5/28/2025) 30 capsule 3    vibegron (GEMTESA) 75 MG TABS tablet Take 1 tablet (75 mg) by mouth daily. (Patient not taking: Reported on 5/28/2025) 90 tablet 3       Allergies   Allergen Reactions    Keflex [Cephalexin]        Family History   Problem Relation Age of Onset    Ovarian Cancer Mother     Uterine Cancer Mother        Additional medical/Social/Surgical histories reviewed in [facility] and updated as appropriate.       PHYSICAL EXAM  There were no vitals taken for this visit.    General  - normal appearance, in no obvious distress  Musculoskeletal - bilateral knee  - stance: normal gait without limp  - inspection: no swelling or effusion  - palpation: no joint line tenderness, patellar tendon non-tender, tender medial patellar facet  - ROM: 135 degrees flexion, -5 degrees extension, not painful, crepitus with weight-bearing flexion  - strength: 5/5 in flexion, 5/5 in extension  - special tests:  (-) Lachman  (-) anterior drawer  (-) Khushi  (-) Thessaly  (-) varus at 0 and 30 degrees flexion  (-) valgus at 0 and 30 degrees flexion  (+) patellar compression  (-) patellar apprehension  Neuro  - no sensory or motor deficit, grossly normal coordination, normal muscle tone      IMAGING : XR knee bilateral 3 views. Final results and radiologist's interpretation, available in the [facility] health record. Images were reviewed with the patient/family members in the office today. My personal interpretation of the performed imaging is mild osteoarthritic changes of medial and patellofemoral joints.    XR KNEE LEFT 1/2 VIEWS  Order: [id number]  Impression    COMPARISON:  None.    FINDINGS:      Right knee: No acute fracture. Mild-moderate medial compartment  osteoarthritic degenerative narrowing. No significant lateral compartment osteoarthritic degenerative narrowing. Mild-moderate patellofemoral compartment osteoarthritic degenerative narrowing. Proximal patellar enthesophyte. Trace physiologic joint fluid. Suggestion of osteopenia.    Left knee: No acute fracture. Suggestion of osteopenia. Mild medial compartment osteoarthritic degenerative narrowing. No significant lateral compartment osteoarthritic degenerative narrowing. Mild-moderate patellofemoral compartment osteoarthritic degenerative narrowing. Scattered chondrocalcinosis.  Exam End: 09/28/23  4:46 PM    Specimen Collected: 09/28/23  4:38 PM Last Resulted: 09/28/23  4:50 PM   Received From: [facility]  Result Received: 11/09/23  9:47 AM     XR KNEE RIGHT 3 VIEWS  Order: [id number]  Impression    COMPARISON:  None.    FINDINGS:      Right knee: No acute fracture. Mild-moderate medial compartment osteoarthritic degenerative narrowing. No significant lateral compartment osteoarthritic degenerative narrowing. Mild-moderate patellofemoral compartment osteoarthritic degenerative narrowing. Proximal patellar enthesophyte. Trace physiologic joint fluid. Suggestion of osteopenia.    Left knee: No acute fracture. Suggestion of osteopenia. Mild medial compartment osteoarthritic degenerative narrowing. No significant lateral compartment osteoarthritic degenerative narrowing. Mild-moderate patellofemoral compartment osteoarthritic degenerative narrowing. Scattered chondrocalcinosis.  Exam End: 09/28/23  4:46 PM    Specimen Collected: 09/28/23  4:38 PM Last Resulted: 09/28/23  4:50 PM   Received From: [facility]  Result Received: 11/09/23  9:47 AM     9/28/23 Dr. Miguel Angel Landaverde MD note at [facility] reviewed for bilateral knee pain.    ASSESSMENT & PLAN  Ms. Pereyra is a 54 year old year old female who presents to clinic today with chronic pain of right knee and more acute pain of left knee over the past three months  after being in an orthopedic boot for achilles tendinitis.    Consistent with chondromalacia patella, no evidence to suggest meniscus tear or ligamentous involvement. XR without significant changes since knee xray 2023. Reassured her there is no severe arthritic changes.    Diagnosis:   Patellofemoral arthritis of bilateral knees    Ayesha and I had a detailed discussion with the help of the Vietnamese language line  service.  I discussed the likely pain caused by patellofemoral early cartilage wear as well as patellar maltracking.  I have recommended she start utilizing Voltaren gel for the inflammation and pain.  She can also use meloxicam once daily as needed when swelling is increased especially if she notes in her right knee.  We discussed consideration for formal physical therapy for the knees, but she is already performing some knee exercises as a relates to her PT for ankle and she wishes to hold off on a separate PT order.  Lastly we discussed continuing a walking program, future considerations for corticosteroid or hyaluronic acid knee injections.  She notes that the pain is certainly not bad enough at this time for injections yet.  I am happy to see her back at any point in time if she notes increasing knee pain or meets for threshold to consider injections or other treatments.      Once she completes PT for ankle and wishes PT for knee, I would be happy to place this order at any time.    It was a pleasure seeing Ayesha today.    Lex Perez DO, NITIN    Primary Care Sports Medicine  Department of Orthopedic Surgery  [facility]

## 2025-06-12 NOTE — LETTER
6/12/2025      Ayesha Pereyra  [address]  [facility] [place] [facility] [place] [address]      Dear Colleague,    Thank you for referring your patient, Ayesha Pereyra, to the [facility] [place]. Please see a copy of my visit note below.    CHIEF COMPLAINT:  Pain of the Left Knee     HISTORY OF PRESENT ILLNESS  Ms. Pereyra is a pleasant 54 year old year old female who presents to clinic today with bilateral knee pain.  Ayesha explains that she's had left knee pain for 3 months, chronically on the right as it relates to diagnosed knee osteoarthritis after evaluation in November 2023. She states that in December 2024 she had been wearing a walking boot for another issue and she believes she was compensating with the way she was walking. She is also starting to notice right knee pain as well. Pain is located over the anterior knees.  Pain is worse with lying down, making beds, bending down to clean. She works as a .     Onset: gradual  Location: bilateral knee anterior aspects  Quality:  aching  Duration: 3 months   Severity: 10/10 at worst  Timing:intermittent episodes   Modifying factors:  resting and non-use makes it better, movement and use makes it worse  Associated signs & symptoms: pain  Previous similar pain: No  Treatments to date:Tylenol, massage helped in 2023 after 9/28 sports med consult at [place].  She also underwent knee corticosteroid injection right knee.    Additional history: as documented    Review of Systems:  Have you recently had a a fever, chills, weight loss? No  Do you have any vision problems? No  Do you have any chest pain or edema? No  Do you have any shortness of breath or wheezing?  No  Do you have stomach problems? No  Do you have any numbness or focal weakness? No  Do you have diabetes? No  Do you have problems with bleeding or clotting? No  Do you have an rashes or other skin lesions? No    MEDICAL HISTORY  Patient Active Problem List   Diagnosis     Right ureteral  stone     Pyelonephritis     Chronic pain of left ankle       Current Outpatient Medications   Medication Sig Dispense Refill     acetaminophen (TYLENOL) 500 MG tablet Take 1-2 tablets by mouth every 6 hours as needed for pain. 1 tablet 0     estradiol (ESTRACE) 0.1 MG/GM vaginal cream Place 2 g vaginally twice a week. 40 g 5     omeprazole (PRILOSEC OTC) 20 MG EC tablet Take 20 mg by mouth daily.       tolterodine ER (DETROL LA) 4 MG 24 hr capsule Take 1 capsule (4 mg) by mouth daily. (Patient not taking: Reported on 5/28/2025) 30 capsule 3     vibegron (GEMTESA) 75 MG TABS tablet Take 1 tablet (75 mg) by mouth daily. (Patient not taking: Reported on 5/28/2025) 90 tablet 3       Allergies   Allergen Reactions     Keflex [Cephalexin]        Family History   Problem Relation Age of Onset     Ovarian Cancer Mother      Uterine Cancer Mother        Additional medical/Social/Surgical histories reviewed in [facility] and updated as appropriate.       PHYSICAL EXAM  There were no vitals taken for this visit.    General  - normal appearance, in no obvious distress  Musculoskeletal - bilateral knee  - stance: normal gait without limp  - inspection: no swelling or effusion  - palpation: no joint line tenderness, patellar tendon non-tender, tender medial patellar facet  - ROM: 135 degrees flexion, -5 degrees extension, not painful, crepitus with weight-bearing flexion  - strength: 5/5 in flexion, 5/5 in extension  - special tests:  (-) Lachman  (-) anterior drawer  (-) Khushi  (-) Thessaly  (-) varus at 0 and 30 degrees flexion  (-) valgus at 0 and 30 degrees flexion  (+) patellar compression  (-) patellar apprehension  Neuro  - no sensory or motor deficit, grossly normal coordination, normal muscle tone      IMAGING : XR knee bilateral 3 views. Final results and radiologist's interpretation, available in the [facility] health record. Images were reviewed with the patient/family members in the office today. My personal interpretation  of the performed imaging is mild osteoarthritic changes of medial and patellofemoral joints.    XR KNEE LEFT 1/2 VIEWS  Order: [id number]  Impression    COMPARISON:  None.    FINDINGS:      Right knee: No acute fracture. Mild-moderate medial compartment osteoarthritic degenerative narrowing. No significant lateral compartment osteoarthritic degenerative narrowing. Mild-moderate patellofemoral compartment osteoarthritic degenerative narrowing. Proximal patellar enthesophyte. Trace physiologic joint fluid. Suggestion of osteopenia.    Left knee: No acute fracture. Suggestion of osteopenia. Mild medial compartment osteoarthritic degenerative narrowing. No significant lateral compartment osteoarthritic degenerative narrowing. Mild-moderate patellofemoral compartment osteoarthritic degenerative narrowing. Scattered chondrocalcinosis.  Exam End: 09/28/23  4:46 PM    Specimen Collected: 09/28/23  4:38 PM Last Resulted: 09/28/23  4:50 PM   Received From: [facility]  Result Received: 11/09/23  9:47 AM     XR KNEE RIGHT 3 VIEWS  Order: [id number]  Impression    COMPARISON:  None.    FINDINGS:      Right knee: No acute fracture. Mild-moderate medial compartment osteoarthritic degenerative narrowing. No significant lateral compartment osteoarthritic degenerative narrowing. Mild-moderate patellofemoral compartment osteoarthritic degenerative narrowing. Proximal patellar enthesophyte. Trace physiologic joint fluid. Suggestion of osteopenia.    Left knee: No acute fracture. Suggestion of osteopenia. Mild medial compartment osteoarthritic degenerative narrowing. No significant lateral compartment osteoarthritic degenerative narrowing. Mild-moderate patellofemoral compartment osteoarthritic degenerative narrowing. Scattered chondrocalcinosis.  Exam End: 09/28/23  4:46 PM    Specimen Collected: 09/28/23  4:38 PM Last Resulted: 09/28/23  4:50 PM   Received From: [facility]  Result Received: 11/09/23  9:47 AM     9/28/23 Dr. Michelle  [facility].    ASSESSMENT & PLAN  Ms. Pereyra is a 54 year old year old female who presents to clinic today with chronic pain of right knee and more acute pain of left knee over the past three months after being in an orthopedic boot for achilles tendinitis.    Consistent with chondromalacia patella, no evidence to suggest meniscus tear or ligamentous involvement. XR without significant changes since knee xray 2023. Reassured her there is no severe arthritic changes.    Diagnosis:   Patellofemoral arthritis of bilateral knees    Ayesha and I had a detailed discussion with the help of the Guamanian language line  service.  I discussed the likely pain caused by patellofemoral early cartilage wear as well as patellar maltracking.  I have recommended she start utilizing Voltaren gel for the inflammation and pain.  She can also use meloxicam once daily as needed when swelling is increased especially if she notes in her right knee.  We discussed consideration for formal physical therapy for the knees, but she is already performing some knee exercises as a relates to her PT for ankle and she wishes to hold off on a separate PT order.  Lastly we discussed continuing a walking program, future considerations for corticosteroid or hyaluronic acid knee injections.  She notes that the pain is certainly not bad enough at this time for injections yet.  I am happy to see her back at any point in time if she notes increasing knee pain or meets for threshold to consider injections or other treatments.      Once she completes PT for ankle and wishes PT for knee, I would be happy to place this order at any time.    It was a pleasure seeing Ayesha today.    Lex Perez DO, NITIN    Primary Care Sports Medicine  Department of Orthopedic Surgery  [facility]      Again, thank you for allowing me to participate in the care of your patient.         Sincerely,        Lex Perez, DO    Electronically signed

## 2025-06-17 ENCOUNTER — THERAPY VISIT (OUTPATIENT)
Dept: PHYSICAL THERAPY | Facility: CLINIC | Age: 55
End: 2025-06-17
Payer: COMMERCIAL

## 2025-06-17 DIAGNOSIS — M25.572 CHRONIC PAIN OF LEFT ANKLE: Primary | ICD-10-CM

## 2025-06-17 DIAGNOSIS — G89.29 CHRONIC PAIN OF LEFT ANKLE: Primary | ICD-10-CM

## 2025-06-17 PROCEDURE — 97035 APP MDLTY 1+ULTRASOUND EA 15: CPT | Mod: GP | Performed by: PHYSICAL THERAPIST

## 2025-06-17 PROCEDURE — 97110 THERAPEUTIC EXERCISES: CPT | Mod: GP | Performed by: PHYSICAL THERAPIST

## 2025-06-17 PROCEDURE — 97140 MANUAL THERAPY 1/> REGIONS: CPT | Mod: GP | Performed by: PHYSICAL THERAPIST

## 2025-06-24 ENCOUNTER — THERAPY VISIT (OUTPATIENT)
Dept: PHYSICAL THERAPY | Facility: CLINIC | Age: 55
End: 2025-06-24
Payer: COMMERCIAL

## 2025-06-24 DIAGNOSIS — M25.572 CHRONIC PAIN OF LEFT ANKLE: Primary | ICD-10-CM

## 2025-06-24 DIAGNOSIS — G89.29 CHRONIC PAIN OF LEFT ANKLE: Primary | ICD-10-CM

## 2025-06-24 PROCEDURE — 97110 THERAPEUTIC EXERCISES: CPT | Mod: GP | Performed by: PHYSICAL THERAPIST

## 2025-06-24 PROCEDURE — 97035 APP MDLTY 1+ULTRASOUND EA 15: CPT | Mod: GP | Performed by: PHYSICAL THERAPIST

## 2025-06-24 PROCEDURE — 97140 MANUAL THERAPY 1/> REGIONS: CPT | Mod: GP | Performed by: PHYSICAL THERAPIST

## 2025-07-29 ENCOUNTER — THERAPY VISIT (OUTPATIENT)
Dept: PHYSICAL THERAPY | Facility: CLINIC | Age: 55
End: 2025-07-29
Payer: COMMERCIAL

## 2025-07-29 DIAGNOSIS — M25.572 CHRONIC PAIN OF LEFT ANKLE: Primary | ICD-10-CM

## 2025-07-29 DIAGNOSIS — G89.29 CHRONIC PAIN OF LEFT ANKLE: Primary | ICD-10-CM

## 2025-07-29 PROCEDURE — 97110 THERAPEUTIC EXERCISES: CPT | Mod: GP | Performed by: PHYSICAL THERAPIST

## 2025-07-29 PROCEDURE — T1013 SIGN LANG/ORAL INTERPRETER: HCPCS

## 2025-07-29 NOTE — PROGRESS NOTES
Ayesha was seen for 4 physical therapy visits through dates 6/3/25 to 7/29/25. She made some progress in the 1st few visits and then was lost to follow up for a little over a month. At that time she continued with home exercise and followed up in clinic on 7/29/25 stating her symptoms were worse and she wishes to see her podiatrist to discuss surgical options. She was educated on continued use of exercise and declined further physical therapy.      DISCHARGE  Reason for Discharge: Patient chooses to discontinue therapy.  Patient has not made expected progress due to interrupted treatment attendance.    Discharge Plan: Patient to continue home program, will follow up with referring provider.     Referring Provider:  Zeyad Betts       07/29/25 0500   Appointment Info   Signing clinician's name / credentials Olga Lazcano, PT, DPT   Total/Authorized Visits 8   Visits Used 4   Medical Diagnosis L Achilles pain   PT Tx Diagnosis L Achilles pain   Quick Adds Certification   Progress Note/Certification   Start of Care Date 06/03/25   Onset of illness/injury or Date of Surgery 03/03/25   Therapy Frequency 1 x week   Predicted Duration 8 weeks   Certification date from 06/03/25   Certification date to 07/29/25   Progress Note Due Date 07/29/25   Progress Note Completed Date 07/29/25       Present Yes    Language Welsh    ID or First/Last Name Nitza   GOALS   PT Goals 2   PT Goal 1   Goal Description Pt will be able to tolerate walking for 30 minutes   Rationale to maximize safety and independence with performance of ADLs and functional tasks;to maximize safety and independence within the home;to maximize safety and independence within the community   Goal Progress Patient reports no improvement in standing or walking tolerance for work   Target Date 07/29/25   Subjective Report   Subjective Report Reports standing and walking for 2hrs before left ankle starts to hurt.  Reports wearing crocs as shoes for work. At time night feels sharp needle pain in the back of the left heel.   Treatment Interventions (PT)   Interventions Therapeutic Procedure/Exercise   Therapeutic Procedure/Exercise   Therapeutic Procedures: strength, endurance, ROM, flexibility minutes (15581) 15   Therapeutic Procedures Ther Proc 2   Ther Proc 1 Education   Ther Proc 1 - Details Educated in role of continued home exercise   Ther Proc 2 Standing gastroc stretch at door   Ther Proc 2 - Details 3 reps, 2x/day, hold for 30sec   PTRx Ther Proc 1 Towel Stretch Gastroc   PTRx Ther Proc 1 - Details 3 repts, 2x/day, hold for 30sec   PTRx Ther Proc 2 Toe Raises   PTRx Ther Proc 2 - Details 20-30 reps 1-2 x day   PTRx Ther Proc 3 Standing Gastroc Stretch   PTRx Ther Proc 3 - Details 3 reps hold 20 seconds 2 x day   Skilled Intervention Verbal cueing for good form   Patient Response/Progress Tolerated well   Education   Learner/Method Patient;Listening;Demonstration;Pictures/Video   Education Comments Pt educated on plan of care   Plan   Home program PTRx   Plan for next session Discharge PT   Total Session Time   Timed Code Treatment Minutes 15   Total Treatment Time (sum of timed and untimed services) 15

## 2025-08-26 ENCOUNTER — OFFICE VISIT (OUTPATIENT)
Dept: PODIATRY | Facility: CLINIC | Age: 55
End: 2025-08-26
Payer: COMMERCIAL

## 2025-08-26 DIAGNOSIS — M92.62 HAGLUND'S DEFORMITY, LEFT: ICD-10-CM

## 2025-08-26 DIAGNOSIS — M76.62 INSERTIONAL TENDINOPATHY OF LEFT ACHILLES TENDON: Primary | ICD-10-CM

## 2025-08-26 DIAGNOSIS — M77.32 BONE SPUR OF POSTERIOR PORTION OF LEFT CALCANEUS: ICD-10-CM

## 2025-08-26 PROCEDURE — 99214 OFFICE O/P EST MOD 30 MIN: CPT | Performed by: PODIATRIST

## 2025-08-26 PROCEDURE — T1013 SIGN LANG/ORAL INTERPRETER: HCPCS | Mod: U4

## 2025-08-27 ENCOUNTER — PREP FOR PROCEDURE (OUTPATIENT)
Dept: PODIATRY | Facility: CLINIC | Age: 55
End: 2025-08-27
Payer: COMMERCIAL

## 2025-08-27 ENCOUNTER — TELEPHONE (OUTPATIENT)
Dept: PODIATRY | Facility: CLINIC | Age: 55
End: 2025-08-27
Payer: COMMERCIAL

## (undated) DEVICE — GLOVE PROTEXIS W/NEU-THERA 8.0  2D73TE80

## (undated) DEVICE — SOL WATER IRRIG 3000ML BAG 2B7117

## (undated) DEVICE — CATH URETERAL OPEN END 6FR AXXCESS

## (undated) DEVICE — PAD CHUX UNDERPAD 23X24" 7136

## (undated) DEVICE — GUIDEWIRE URO SOLO FLEX STR 0.035"X150CM HW35FS

## (undated) DEVICE — LINEN TOWEL PACK X5 5464

## (undated) DEVICE — PACK CYSTOSCOPY SBA15CYFSI

## (undated) RX ORDER — FENTANYL CITRATE 50 UG/ML
INJECTION, SOLUTION INTRAMUSCULAR; INTRAVENOUS
Status: DISPENSED
Start: 2017-08-25

## (undated) RX ORDER — LIDOCAINE HYDROCHLORIDE 20 MG/ML
INJECTION, SOLUTION EPIDURAL; INFILTRATION; INTRACAUDAL; PERINEURAL
Status: DISPENSED
Start: 2017-08-25

## (undated) RX ORDER — ACETAMINOPHEN 650 MG/1
SUPPOSITORY RECTAL
Status: DISPENSED
Start: 2017-08-25

## (undated) RX ORDER — PROPOFOL 10 MG/ML
INJECTION, EMULSION INTRAVENOUS
Status: DISPENSED
Start: 2017-08-25

## (undated) RX ORDER — HYDROMORPHONE HYDROCHLORIDE 1 MG/ML
INJECTION, SOLUTION INTRAMUSCULAR; INTRAVENOUS; SUBCUTANEOUS
Status: DISPENSED
Start: 2017-08-25